# Patient Record
Sex: FEMALE | Race: BLACK OR AFRICAN AMERICAN | Employment: OTHER | URBAN - METROPOLITAN AREA
[De-identification: names, ages, dates, MRNs, and addresses within clinical notes are randomized per-mention and may not be internally consistent; named-entity substitution may affect disease eponyms.]

---

## 2017-01-27 ENCOUNTER — PATIENT MESSAGE (OUTPATIENT)
Dept: FAMILY MEDICINE CLINIC | Age: 62
End: 2017-01-27

## 2017-01-27 ENCOUNTER — OFFICE VISIT (OUTPATIENT)
Dept: FAMILY MEDICINE CLINIC | Age: 62
End: 2017-01-27

## 2017-01-27 VITALS
OXYGEN SATURATION: 96 % | HEART RATE: 86 BPM | HEIGHT: 67 IN | TEMPERATURE: 98 F | SYSTOLIC BLOOD PRESSURE: 110 MMHG | BODY MASS INDEX: 29.26 KG/M2 | DIASTOLIC BLOOD PRESSURE: 82 MMHG | WEIGHT: 186.4 LBS

## 2017-01-27 DIAGNOSIS — Z11.59 NEED FOR HEPATITIS C SCREENING TEST: ICD-10-CM

## 2017-01-27 DIAGNOSIS — I10 ESSENTIAL HYPERTENSION: ICD-10-CM

## 2017-01-27 DIAGNOSIS — J30.1 NON-SEASONAL ALLERGIC RHINITIS DUE TO POLLEN: ICD-10-CM

## 2017-01-27 DIAGNOSIS — E78.2 MIXED HYPERLIPIDEMIA: ICD-10-CM

## 2017-01-27 DIAGNOSIS — Z11.4 SCREENING FOR HIV (HUMAN IMMUNODEFICIENCY VIRUS): ICD-10-CM

## 2017-01-27 DIAGNOSIS — H91.93 HEARING LOSS, BILATERAL: Primary | ICD-10-CM

## 2017-01-27 DIAGNOSIS — Z12.11 SCREEN FOR COLON CANCER: ICD-10-CM

## 2017-01-27 DIAGNOSIS — H61.23 BILATERAL IMPACTED CERUMEN: ICD-10-CM

## 2017-01-27 LAB
A/G RATIO: 1.9 (ref 1.1–2.2)
ALBUMIN SERPL-MCNC: 4.6 G/DL (ref 3.4–5)
ALP BLD-CCNC: 139 U/L (ref 40–129)
ALT SERPL-CCNC: 16 U/L (ref 10–40)
ANION GAP SERPL CALCULATED.3IONS-SCNC: 13 MMOL/L (ref 3–16)
AST SERPL-CCNC: 18 U/L (ref 15–37)
BILIRUB SERPL-MCNC: 0.4 MG/DL (ref 0–1)
BUN BLDV-MCNC: 10 MG/DL (ref 7–20)
CALCIUM SERPL-MCNC: 9.9 MG/DL (ref 8.3–10.6)
CHLORIDE BLD-SCNC: 106 MMOL/L (ref 99–110)
CHOLESTEROL, TOTAL: 228 MG/DL (ref 0–199)
CO2: 25 MMOL/L (ref 21–32)
CREAT SERPL-MCNC: 0.7 MG/DL (ref 0.6–1.1)
GFR AFRICAN AMERICAN: >60
GFR NON-AFRICAN AMERICAN: >60
GLOBULIN: 2.4 G/DL
GLUCOSE BLD-MCNC: 90 MG/DL (ref 70–99)
HDLC SERPL-MCNC: 79 MG/DL (ref 40–60)
HEPATITIS C ANTIBODY INTERPRETATION: NORMAL
LDL CHOLESTEROL CALCULATED: 134 MG/DL
POTASSIUM SERPL-SCNC: 4.3 MMOL/L (ref 3.5–5.1)
SODIUM BLD-SCNC: 144 MMOL/L (ref 136–145)
TOTAL PROTEIN: 7 G/DL (ref 6.4–8.2)
TRIGL SERPL-MCNC: 75 MG/DL (ref 0–150)
VLDLC SERPL CALC-MCNC: 15 MG/DL

## 2017-01-27 PROCEDURE — 99214 OFFICE O/P EST MOD 30 MIN: CPT | Performed by: FAMILY MEDICINE

## 2017-01-27 RX ORDER — FLUTICASONE PROPIONATE 50 MCG
2 SPRAY, SUSPENSION (ML) NASAL DAILY
Qty: 1 BOTTLE | Refills: 3 | Status: SHIPPED | OUTPATIENT
Start: 2017-01-27 | End: 2020-03-05

## 2017-01-28 RX ORDER — TRIAMTERENE AND HYDROCHLOROTHIAZIDE 75; 50 MG/1; MG/1
1 TABLET ORAL DAILY
Qty: 90 TABLET | Refills: 1 | Status: SHIPPED | OUTPATIENT
Start: 2017-01-28 | End: 2017-09-19 | Stop reason: SDUPTHER

## 2017-01-30 LAB — HIV-1 AND HIV-2 ANTIBODIES: NORMAL

## 2017-06-29 ENCOUNTER — HOSPITAL ENCOUNTER (OUTPATIENT)
Dept: MRI IMAGING | Age: 62
Discharge: HOME | End: 2017-06-29
Attending: NEUROLOGICAL SURGERY | Admitting: NEUROLOGICAL SURGERY

## 2017-06-29 DIAGNOSIS — D35.2 BENIGN NEOPLASM OF PITUITARY GLAND AND CRANIOPHARYNGEAL DUCT (POUCH) (HCC): ICD-10-CM

## 2017-06-29 DIAGNOSIS — D35.3 BENIGN NEOPLASM OF PITUITARY GLAND AND CRANIOPHARYNGEAL DUCT (POUCH) (HCC): ICD-10-CM

## 2017-06-29 DIAGNOSIS — D35.2 BENIGN NEOPLASM OF PITUITARY GLAND (HCC): ICD-10-CM

## 2017-06-29 RX ORDER — SODIUM CHLORIDE 0.9 % (FLUSH) 0.9 %
10 SYRINGE (ML) INJECTION ONCE
Status: COMPLETED | OUTPATIENT
Start: 2017-06-29 | End: 2017-06-29

## 2017-06-29 RX ADMIN — Medication 10 ML: at 07:45

## 2017-08-24 ENCOUNTER — HOSPITAL ENCOUNTER (OUTPATIENT)
Dept: MAMMOGRAPHY | Age: 62
Discharge: HOME | End: 2017-08-24
Attending: FAMILY MEDICINE | Admitting: FAMILY MEDICINE

## 2017-08-24 DIAGNOSIS — Z12.31 VISIT FOR SCREENING MAMMOGRAM: ICD-10-CM

## 2017-09-19 ENCOUNTER — PATIENT MESSAGE (OUTPATIENT)
Dept: FAMILY MEDICINE CLINIC | Age: 62
End: 2017-09-19

## 2017-09-19 DIAGNOSIS — J01.90 ACUTE BACTERIAL SINUSITIS: Primary | ICD-10-CM

## 2017-09-19 DIAGNOSIS — I10 ESSENTIAL HYPERTENSION: ICD-10-CM

## 2017-09-19 DIAGNOSIS — B96.89 ACUTE BACTERIAL SINUSITIS: Primary | ICD-10-CM

## 2017-09-19 RX ORDER — AMOXICILLIN 500 MG/1
500 CAPSULE ORAL 3 TIMES DAILY
Qty: 30 CAPSULE | Refills: 0 | Status: SHIPPED | OUTPATIENT
Start: 2017-09-19 | End: 2017-09-29

## 2017-09-19 RX ORDER — TRIAMTERENE AND HYDROCHLOROTHIAZIDE 75; 50 MG/1; MG/1
1 TABLET ORAL DAILY
Qty: 30 TABLET | Refills: 0 | Status: SHIPPED | OUTPATIENT
Start: 2017-09-19 | End: 2017-10-17 | Stop reason: SDUPTHER

## 2017-10-17 DIAGNOSIS — I10 ESSENTIAL HYPERTENSION: ICD-10-CM

## 2017-10-17 RX ORDER — TRIAMTERENE AND HYDROCHLOROTHIAZIDE 75; 50 MG/1; MG/1
1 TABLET ORAL DAILY
Qty: 21 TABLET | Refills: 0 | Status: SHIPPED | OUTPATIENT
Start: 2017-10-17 | End: 2020-03-05

## 2018-06-07 ENCOUNTER — HOSPITAL ENCOUNTER (OUTPATIENT)
Dept: MRI IMAGING | Age: 63
Discharge: HOME | End: 2018-06-07
Attending: NEUROLOGICAL SURGERY | Admitting: NEUROLOGICAL SURGERY

## 2018-06-07 DIAGNOSIS — E23.7 DISORDER OF PITUITARY GLAND (HCC): ICD-10-CM

## 2018-06-07 DIAGNOSIS — E23.7: ICD-10-CM

## 2018-06-07 LAB
ALBUMIN SERPL-MCNC: 4.3 G/DL (ref 3.4–5)
ANION GAP SERPL CALCULATED.3IONS-SCNC: 12 MMOL/L (ref 3–16)
BUN BLDV-MCNC: 8 MG/DL (ref 7–20)
CALCIUM SERPL-MCNC: 9.6 MG/DL (ref 8.3–10.6)
CHLORIDE BLD-SCNC: 108 MMOL/L (ref 99–110)
CO2: 26 MMOL/L (ref 21–32)
CREAT SERPL-MCNC: 0.7 MG/DL (ref 0.6–1.2)
GFR AFRICAN AMERICAN: >60
GFR NON-AFRICAN AMERICAN: >60
GLUCOSE BLD-MCNC: 120 MG/DL (ref 70–99)
PHOSPHORUS: 3.2 MG/DL (ref 2.5–4.9)
POTASSIUM SERPL-SCNC: 4.2 MMOL/L (ref 3.5–5.1)
SODIUM BLD-SCNC: 146 MMOL/L (ref 136–145)

## 2018-06-07 RX ORDER — SODIUM CHLORIDE 0.9 % (FLUSH) 0.9 %
10 SYRINGE (ML) INJECTION ONCE
Status: COMPLETED | OUTPATIENT
Start: 2018-06-07 | End: 2018-06-07

## 2018-06-07 RX ADMIN — Medication 10 ML: at 08:07

## 2019-08-15 ENCOUNTER — HOSPITAL ENCOUNTER (OUTPATIENT)
Dept: MRI IMAGING | Age: 64
Discharge: HOME | End: 2019-08-15
Payer: COMMERCIAL

## 2019-08-15 DIAGNOSIS — D35.2 PITUITARY ADENOMA (HCC): ICD-10-CM

## 2019-08-15 LAB
BUN BLDV-MCNC: 9 MG/DL (ref 7–20)
CREAT SERPL-MCNC: 0.7 MG/DL (ref 0.6–1.2)
GFR AFRICAN AMERICAN: >60
GFR NON-AFRICAN AMERICAN: >60

## 2019-08-15 PROCEDURE — A9579 GAD-BASE MR CONTRAST NOS,1ML: HCPCS | Performed by: NEUROLOGICAL SURGERY

## 2019-08-15 PROCEDURE — 2580000003 HC RX 258: Performed by: NEUROLOGICAL SURGERY

## 2019-08-15 PROCEDURE — 82565 ASSAY OF CREATININE: CPT

## 2019-08-15 PROCEDURE — 84520 ASSAY OF UREA NITROGEN: CPT

## 2019-08-15 PROCEDURE — 36415 COLL VENOUS BLD VENIPUNCTURE: CPT

## 2019-08-15 PROCEDURE — 70553 MRI BRAIN STEM W/O & W/DYE: CPT

## 2019-08-15 PROCEDURE — 6360000004 HC RX CONTRAST MEDICATION: Performed by: NEUROLOGICAL SURGERY

## 2019-08-15 RX ORDER — 0.9 % SODIUM CHLORIDE 0.9 %
10 VIAL (ML) INJECTION
Status: COMPLETED | OUTPATIENT
Start: 2019-08-15 | End: 2019-08-15

## 2019-08-15 RX ADMIN — Medication 10 ML: at 11:07

## 2019-08-15 RX ADMIN — GADOTERIDOL 16 ML: 279.3 INJECTION, SOLUTION INTRAVENOUS at 11:07

## 2020-02-07 ENCOUNTER — HOSPITAL ENCOUNTER (OUTPATIENT)
Dept: MRI IMAGING | Age: 65
Discharge: HOME | End: 2020-02-07
Payer: COMMERCIAL

## 2020-02-07 LAB
BUN BLDV-MCNC: 8 MG/DL (ref 7–20)
CREAT SERPL-MCNC: 0.7 MG/DL (ref 0.6–1.2)
GFR AFRICAN AMERICAN: >60
GFR NON-AFRICAN AMERICAN: >60

## 2020-02-07 PROCEDURE — 84520 ASSAY OF UREA NITROGEN: CPT

## 2020-02-07 PROCEDURE — 6360000004 HC RX CONTRAST MEDICATION: Performed by: NEUROLOGICAL SURGERY

## 2020-02-07 PROCEDURE — 82565 ASSAY OF CREATININE: CPT

## 2020-02-07 PROCEDURE — 70553 MRI BRAIN STEM W/O & W/DYE: CPT

## 2020-02-07 PROCEDURE — 2580000003 HC RX 258: Performed by: NEUROLOGICAL SURGERY

## 2020-02-07 PROCEDURE — A9577 INJ MULTIHANCE: HCPCS | Performed by: NEUROLOGICAL SURGERY

## 2020-02-07 PROCEDURE — 36415 COLL VENOUS BLD VENIPUNCTURE: CPT

## 2020-02-07 RX ORDER — SODIUM CHLORIDE 0.9 % (FLUSH) 0.9 %
10 SYRINGE (ML) INJECTION PRN
Status: DISCONTINUED | OUTPATIENT
Start: 2020-02-07 | End: 2020-02-08 | Stop reason: HOSPADM

## 2020-02-07 RX ADMIN — Medication 10 ML: at 07:34

## 2020-02-07 RX ADMIN — GADOBENATE DIMEGLUMINE 15 ML: 529 INJECTION, SOLUTION INTRAVENOUS at 07:34

## 2020-03-05 ENCOUNTER — OFFICE VISIT (OUTPATIENT)
Dept: ENT CLINIC | Age: 65
End: 2020-03-05
Payer: COMMERCIAL

## 2020-03-05 VITALS
DIASTOLIC BLOOD PRESSURE: 87 MMHG | SYSTOLIC BLOOD PRESSURE: 141 MMHG | HEIGHT: 67 IN | TEMPERATURE: 98.9 F | BODY MASS INDEX: 30.42 KG/M2 | WEIGHT: 193.8 LBS | HEART RATE: 97 BPM

## 2020-03-05 PROCEDURE — 99244 OFF/OP CNSLTJ NEW/EST MOD 40: CPT | Performed by: OTOLARYNGOLOGY

## 2020-03-05 PROCEDURE — 31231 NASAL ENDOSCOPY DX: CPT | Performed by: OTOLARYNGOLOGY

## 2020-03-05 ASSESSMENT — ENCOUNTER SYMPTOMS
STRIDOR: 0
CHOKING: 0
NAUSEA: 0
EYE DISCHARGE: 0
FACIAL SWELLING: 0
SINUS PAIN: 0
BLOOD IN STOOL: 0
EYE PAIN: 0
DIARRHEA: 0
COLOR CHANGE: 0
SORE THROAT: 0
BACK PAIN: 0
VOICE CHANGE: 0
SHORTNESS OF BREATH: 0
CHEST TIGHTNESS: 0
CONSTIPATION: 0
VOMITING: 0
TROUBLE SWALLOWING: 0
SINUS PRESSURE: 0
COUGH: 0
WHEEZING: 0
APNEA: 0
RHINORRHEA: 0

## 2020-03-05 NOTE — PROGRESS NOTES
tobacco: Never Used   Substance and Sexual Activity    Alcohol use: Yes     Alcohol/week: 1.0 standard drinks     Types: 1 Glasses of wine per week     Comment: 2-3 glasses wine/week    Drug use: No    Sexual activity: Not on file   Lifestyle    Physical activity:     Days per week: Not on file     Minutes per session: Not on file    Stress: Not on file   Relationships    Social connections:     Talks on phone: Not on file     Gets together: Not on file     Attends Spiritism service: Not on file     Active member of club or organization: Not on file     Attends meetings of clubs or organizations: Not on file     Relationship status: Not on file    Intimate partner violence:     Fear of current or ex partner: Not on file     Emotionally abused: Not on file     Physically abused: Not on file     Forced sexual activity: Not on file   Other Topics Concern    Not on file   Social History Narrative    Not on file       DRUG/FOOD ALLERGIES: Metoprolol    CURRENT MEDICATIONS  Prior to Admission medications    Medication Sig Start Date End Date Taking? Authorizing Provider   triamterene-hydrochlorothiazide (MAXZIDE) 75-50 MG per tablet TAKE 1 TABLET BY MOUTH DAILY 10/17/17 10/17/18  Des Dowell MD   fluticasone [facility]) 50 MCG/ACT nasal spray 2 sprays by Nasal route daily 1/27/17 1/27/18  Des Dowell MD     Review of Systems   Constitutional: Negative for activity change, appetite change, chills, fatigue and fever. HENT: Negative for congestion, dental problem, drooling, ear discharge, ear pain, facial swelling, hearing loss, mouth sores, nosebleeds, postnasal drip, rhinorrhea, sinus pressure, sinus pain, sneezing, sore throat, tinnitus, trouble swallowing and voice change. Eyes: Negative for pain, discharge and visual disturbance. Respiratory: Negative for apnea, cough, choking, chest tightness, shortness of breath, wheezing and stridor. Cardiovascular: Negative for palpitations.    Gastrointestinal: Negative for blood in stool, constipation, diarrhea, nausea and vomiting. Endocrine: Negative for cold intolerance, heat intolerance, polydipsia, polyphagia and polyuria. Musculoskeletal: Negative for back pain, gait problem, neck pain and neck stiffness. Skin: Negative for color change, pallor, rash and wound. Allergic/Immunologic: Negative for environmental allergies, food allergies and immunocompromised state. Neurological: Negative for dizziness, facial asymmetry, speech difficulty, light-headedness, numbness and headaches. Hematological: Negative for adenopathy. Does not bruise/bleed easily. Psychiatric/Behavioral: Negative for agitation, confusion, self-injury and sleep disturbance. The patient is not nervous/anxious. Objective:   Physical Exam  Constitutional:       Appearance: She is well-developed. She is not ill-appearing. HENT:      Head: Normocephalic and atraumatic. Not macrocephalic and not microcephalic. No raccoon eyes, Devries's sign, abrasion, contusion, right periorbital erythema, left periorbital erythema or laceration. Hair is normal.      Jaw: No trismus. Salivary Glands: Right salivary gland is not diffusely enlarged. Left salivary gland is not diffusely enlarged. Right Ear: Hearing, tympanic membrane and external ear normal. No decreased hearing noted. No drainage, swelling or tenderness. No middle ear effusion. No mastoid tenderness. Tympanic membrane is not perforated, retracted or bulging. Tympanic membrane has normal mobility. Left Ear: Hearing, tympanic membrane and external ear normal. No decreased hearing noted. No drainage, swelling or tenderness. No middle ear effusion. No mastoid tenderness. Tympanic membrane is not perforated, retracted or bulging. Tympanic membrane has normal mobility. Ears:      Hill exam findings: does not lateralize. Right Rinne: AC > BC. Left Rinne: AC > BC.      Nose: No nasal deformity, septal deviation, laceration, mucosal edema or rhinorrhea. Right Nostril: No epistaxis. Left Nostril: No epistaxis. Right Turbinates: Not enlarged. Left Turbinates: Not enlarged. Right Sinus: No maxillary sinus tenderness or frontal sinus tenderness. Left Sinus: No maxillary sinus tenderness or frontal sinus tenderness. Mouth/Throat:      Lips: No lesions. Mouth: Mucous membranes are not pale, not dry and not cyanotic. No lacerations or oral lesions. Dentition: Normal dentition. No dental caries or dental abscesses. Tongue: No lesions. Palate: No mass. Pharynx: Uvula midline. No oropharyngeal exudate, posterior oropharyngeal erythema or uvula swelling. Tonsils: No tonsillar abscesses. Eyes:      General: Lids are normal. Lids are everted, no foreign bodies appreciated. Right eye: No discharge. Left eye: No discharge. Extraocular Movements:      Right eye: Normal extraocular motion and no nystagmus. Left eye: Normal extraocular motion and no nystagmus. Conjunctiva/sclera:      Right eye: No chemosis or exudate. Left eye: No chemosis or exudate. Neck:      Musculoskeletal: Neck supple. Thyroid: No thyroid mass or thyromegaly. Vascular: Normal carotid pulses. Trachea: Trachea normal. No tracheal deviation. Cardiovascular:      Rate and Rhythm: Normal rate and regular rhythm. Pulmonary:      Effort: No tachypnea, bradypnea or respiratory distress. Breath sounds: No stridor. Musculoskeletal:      Right shoulder: She exhibits normal range of motion. Left shoulder: She exhibits normal range of motion. Lymphadenopathy:      Head:      Right side of head: No submental, submandibular, tonsillar, preauricular, posterior auricular or occipital adenopathy. Left side of head: No submental, submandibular, tonsillar, preauricular, posterior auricular or occipital adenopathy.       Cervical:      Right cervical: No superficial, deep or posterior cervical adenopathy. Left cervical: No superficial, deep or posterior cervical adenopathy. Skin:     Findings: No bruising, erythema, laceration or lesion. Neurological:      Mental Status: She is alert and oriented to person, place, and time. Psychiatric:         Speech: Speech normal.         Behavior: Behavior normal.       Nasal Endoscopy    Pre OP: recurrent pituitary tumor  Post OP: same  Procedure: Nasal endoscopy    After obtaining verbal consent from the patient 1% lidocaine with afrin was sprayed into the nasal cavities. After allowing a time for anesthesia, a nasal endoscope was placed into the nostril. The septum, inferior, and middle turbinates were examined. The middle meatus, and sphenoethmoid recess was examined bilaterally. Cultures were not obtained from the sinuses. There were no complications. Pertinent positives included: There was not edema and purulence in the left middle meatus. There was not edema and purulence at the right middle meatus. Polyps were not identified in the sinuses. Masses were not identified. Sphenoid patent    Tolerated well without complication. I attest that I was present for and did the entire procedure myself. Assessment:       Diagnosis Orders   1. Pituitary macroadenoma ([facility] [address]             Plan:      OR for endo pit    The patient has a diagnosis of pituitary tumor which has not been responsive or cannot be treated with maximal medical therapy. The patient has been advised of options including further medical therapy, surgery, or nothing. The risks and benefits of surgery were described not limited to injury to the skull base, brain, stroke, hemorrhage, brain fluid leak, double vision, visual loss, epistaxis, and need for further surgical management of disease. Patient expectations during and after surgery including post-operative sinonasal care and pain control were described.  Questions were answered and the

## 2020-03-06 ENCOUNTER — TELEPHONE (OUTPATIENT)
Dept: ENT CLINIC | Age: 65
End: 2020-03-06

## 2020-03-06 NOTE — TELEPHONE ENCOUNTER
Received surgery order from Dr. Denton Dee, patient is to be scheduled for a neuroendoscopy w/ removal of pituitary tumor, transphenoidal and stereotactic computer assisted surgery. This is a co-case with Dr. Abby Pruett from [address] [place] [address]. [facility]  Procedure length and patient status to be determined by Dr. Rosa Mansfield.   [facility].

## 2020-03-26 NOTE — PROGRESS NOTES
[facility] / [facility] ([facility]) [address], [address]    Acknowledgment of Informed Consent for Surgical or Medical Procedure and Sedation  I agree to allow doctor(s) [address] and his/her associates or assistants, including residents and/or other qualified medical practitioner to perform the following medical treatment or procedure and to administer or direct the administration of sedation as necessary:  Procedure(s): NEUROENDOSCOPY WITH REMOVAL OF PITUITARY TUMOR TRANSPHENOIDAL, STEREOTACTIC COMPUTER ASSISTED SURGERY, REDO ENDOSCOPIC ENDONASAL TRANSPHENOIDAL RESECTION OF PITUITARY TUMOR, POSSIBLE LUMBAR DRAIN, POSSIBLE ABDOMINAL FAT GRAFT  My doctor has explained the following regarding the proposed procedure:   the explanation of the procedure   the benefits of the procedure   the potential problems that might occur during recuperation   the risks and side effects of the procedure which could include but are not limited to severe blood loss, infection, stroke or death   the benefits, risks and side effect of alternative procedures including the consequences of declining this procedure or any alternative procedures   the likelihood of achieving satisfactory results. I acknowledge no guarantee or assurance has been made to me regarding the results. I understand that during the course of this treatment/procedure, unforeseen conditions can occur which require an additional or different procedure. I agree to allow my physician or assistants to perform such extension of the original procedure as they may find necessary. I understand that sedation will often result in temporary impairment of memory and fine motor skills and that sedation can occasionally progress to a state of deep sedation or general anesthesia.     I understand the risks of anesthesia for surgery include, but are not limited to, sore throat, hoarseness, injury to face, mouth, or teeth; nausea; headache; injury to blood vessels or nerves; death, brain damage, or paralysis. I understand that if I have a Limitation of Treatment order in effect during my hospitalization, the order may or may not be in effect during this procedure. I give my doctor permission to give me blood or blood products. I understand that there are risks with receiving blood such as hepatitis, AIDS, fever, or allergic reaction. I acknowledge that the risks, benefits, and alternatives of this treatment have been explained to me and that no express or implied warranty has been given by the hospital, any blood bank, or any person or entity as to the blood or blood components transfused. At the discretion of my doctor, I agree to allow observers, equipment/product representatives and allow photographing, and/or televising of the procedure, provided my name or identity is maintained confidentially. I agree the hospital may dispose of or use for scientific or educational purposes any tissue, fluid, or body parts which may be removed.     ________________________________Date________Time______ am/pm  ([facility])  Patient or Signature of Closest Relative or Legal Guardian    ________________________________Date________Time______am/pm      Page 1 of  1  Witness

## 2020-06-10 ENCOUNTER — OFFICE VISIT (OUTPATIENT)
Dept: INTERNAL MEDICINE CLINIC | Age: 65
End: 2020-06-10
Payer: COMMERCIAL

## 2020-06-10 VITALS
WEIGHT: 195 LBS | HEIGHT: 67 IN | BODY MASS INDEX: 30.61 KG/M2 | HEART RATE: 76 BPM | DIASTOLIC BLOOD PRESSURE: 94 MMHG | SYSTOLIC BLOOD PRESSURE: 138 MMHG

## 2020-06-10 DIAGNOSIS — Z76.89 ENCOUNTER TO ESTABLISH CARE: ICD-10-CM

## 2020-06-10 PROBLEM — D35.2 BENIGN NEOPLASM OF PITUITARY GLAND AND CRANIOPHARYNGEAL DUCT (HCC): Status: ACTIVE | Noted: 2020-06-10

## 2020-06-10 PROBLEM — D35.3 BENIGN NEOPLASM OF PITUITARY GLAND AND CRANIOPHARYNGEAL DUCT (HCC): Status: ACTIVE | Noted: 2020-06-10

## 2020-06-10 PROBLEM — E66.3 OVERWEIGHT: Status: ACTIVE | Noted: 2017-07-03

## 2020-06-10 LAB
CHOLESTEROL, TOTAL: 218 MG/DL (ref 0–199)
HDLC SERPL-MCNC: 61 MG/DL (ref 40–60)
LDL CHOLESTEROL CALCULATED: 137 MG/DL
TRIGL SERPL-MCNC: 99 MG/DL (ref 0–150)
VLDLC SERPL CALC-MCNC: 20 MG/DL

## 2020-06-10 PROCEDURE — 81002 URINALYSIS NONAUTO W/O SCOPE: CPT | Performed by: NURSE PRACTITIONER

## 2020-06-10 PROCEDURE — 93000 ELECTROCARDIOGRAM COMPLETE: CPT | Performed by: NURSE PRACTITIONER

## 2020-06-10 PROCEDURE — 99244 OFF/OP CNSLTJ NEW/EST MOD 40: CPT | Performed by: NURSE PRACTITIONER

## 2020-06-10 RX ORDER — TRIAMTERENE AND HYDROCHLOROTHIAZIDE 75; 50 MG/1; MG/1
1 TABLET ORAL DAILY
Qty: 90 TABLET | Refills: 3 | Status: SHIPPED | OUTPATIENT
Start: 2020-06-10 | End: 2020-09-23

## 2020-06-10 RX ORDER — NITROFURANTOIN 25; 75 MG/1; MG/1
100 CAPSULE ORAL 2 TIMES DAILY
Qty: 10 CAPSULE | Refills: 0 | Status: SHIPPED | OUTPATIENT
Start: 2020-06-10 | End: 2020-06-15

## 2020-06-10 ASSESSMENT — ENCOUNTER SYMPTOMS
SINUS PRESSURE: 0
SHORTNESS OF BREATH: 0
WHEEZING: 0
CHEST TIGHTNESS: 0
COUGH: 0
EYE PAIN: 0
ABDOMINAL PAIN: 0
BLOOD IN STOOL: 0
APNEA: 0
RHINORRHEA: 0
NAUSEA: 0
EYE REDNESS: 0
ABDOMINAL DISTENTION: 0
DIARRHEA: 0
VOMITING: 0
BACK PAIN: 0
CONSTIPATION: 0

## 2020-06-10 NOTE — PROGRESS NOTES
6/10/2020    This is a 60 y.o. female   Chief Complaint   Patient presents with    Pre-op Exam     Pitutary tumor removal 7/1 Dr Olivia Torres     HPI     New patient appointment and preop. Carlos Alberto Farley is a 60 y.o.  female who comes for a preoperative exam.  She  is referred by Dr. Sree Cramer for perioperative risk determination for upcoming surgery for NEURO ENDOSCOPY WITH REMOVAL OF PITUITARY TUMOR TRANSPHENOIDAL, STEREOTACTIC COMPUTER ASSISTED SURGERY, REDO ENDOSCOPIC ENDONASAL TRANSPHENOIDAL RESECTION OF PITUITARY TUMOR, POSSIBLE LUMBAR DRAIN, POSSIBLE ABDOMINAL FAT GRAFT on July 1, 2020. Denies taking any asa, blood thinners, fish oil, NSAIDs or herbals. Denies any previous complications with anesthesia. Carlos Alberto Farley returns for follow up of hypertension. Patient is not currently on any medication for her blood pressure. Her blood pressure is not well controlled today. Has not been on her BP medication for about a year. Her BP was previously controlled on this medication. Started last week with dysuria, frequency and urgency - waxing and waning. Denies fever or chills.        Past Medical History:   Diagnosis Date    Allergic rhinitis     Chicken pox     GERD (gastroesophageal reflux disease)     Hypertension     Kidney stone     Pituitary adenoma [facility])      Past Surgical History:   Procedure Laterality Date    CYST REMOVAL Left 9/24/13    EXCSION OF LEFT HAND DORSAL GANGLION CYST    HYSTERECTOMY      PITUITARY SURGERY  2013    CARRILLO AND BSO       Social History     Socioeconomic History    Marital status:      Spouse name: Not on file    Number of children: Not on file    Years of education: Not on file    Highest education level: Not on file   Occupational History    Not on file   Social Needs    Financial resource strain: Not hard at all   [place] insecurity     Worry: Never true     Inability: Never true   [employer] needs     Medical: No     Non-medical: No

## 2020-06-11 ENCOUNTER — TELEPHONE (OUTPATIENT)
Dept: INTERNAL MEDICINE CLINIC | Age: 65
End: 2020-06-11

## 2020-06-12 LAB
ORGANISM: ABNORMAL
URINE CULTURE, ROUTINE: ABNORMAL

## 2020-06-17 ENCOUNTER — OFFICE VISIT (OUTPATIENT)
Dept: INTERNAL MEDICINE CLINIC | Age: 65
End: 2020-06-17
Payer: COMMERCIAL

## 2020-06-17 VITALS
BODY MASS INDEX: 29.29 KG/M2 | TEMPERATURE: 98.6 F | WEIGHT: 187 LBS | HEART RATE: 96 BPM | DIASTOLIC BLOOD PRESSURE: 86 MMHG | SYSTOLIC BLOOD PRESSURE: 136 MMHG

## 2020-06-17 PROCEDURE — 99213 OFFICE O/P EST LOW 20 MIN: CPT | Performed by: NURSE PRACTITIONER

## 2020-06-17 RX ORDER — CIPROFLOXACIN 250 MG/1
250 TABLET, FILM COATED ORAL 2 TIMES DAILY
Qty: 6 TABLET | Refills: 0 | Status: SHIPPED | OUTPATIENT
Start: 2020-06-17 | End: 2021-03-12 | Stop reason: SDUPTHER

## 2020-06-19 NOTE — PROGRESS NOTES
teeth; nausea; headache; injury to blood vessels or nerves; death, brain damage, or paralysis. I understand that if I have a Limitation of Treatment order in effect during my hospitalization, the order may or may not be in effect during this procedure. I give my doctor permission to give me blood or blood products. I understand that there are risks with receiving blood such as hepatitis, AIDS, fever, or allergic reaction. I acknowledge that the risks, benefits, and alternatives of this treatment have been explained to me and that no express or implied warranty has been given by the hospital, any blood bank, or any person or entity as to the blood or blood components transfused. At the discretion of my doctor, I agree to allow observers, equipment/product representatives and allow photographing, and/or televising of the procedure, provided my name or identity is maintained confidentially. I agree the hospital may dispose of or use for scientific or educational purposes any tissue, fluid, or body parts which may be removed.     ________________________________Date________Time______ am/pm  ([facility])  Patient or Signature of Closest Relative or Legal Guardian    ________________________________Date________Time______am/pm      Page 1 of  1  Witness

## 2020-06-24 ENCOUNTER — HOSPITAL ENCOUNTER (OUTPATIENT)
Dept: CT IMAGING | Age: 65
Discharge: HOME | End: 2020-06-24
Payer: COMMERCIAL

## 2020-06-24 LAB
GFR AFRICAN AMERICAN: >60
GFR NON-AFRICAN AMERICAN: >60
PERFORMED ON: NORMAL
POC CREATININE: 0.8 MG/DL (ref 0.6–1.2)
POC SAMPLE TYPE: NORMAL

## 2020-06-24 PROCEDURE — 70486 CT MAXILLOFACIAL W/O DYE: CPT

## 2020-06-24 PROCEDURE — 70496 CT ANGIOGRAPHY HEAD: CPT

## 2020-06-24 PROCEDURE — 6360000004 HC RX CONTRAST MEDICATION: Performed by: NEUROLOGICAL SURGERY

## 2020-06-24 PROCEDURE — 82565 ASSAY OF CREATININE: CPT

## 2020-06-24 RX ADMIN — IOPAMIDOL 80 ML: 755 INJECTION, SOLUTION INTRAVENOUS at 09:33

## 2020-06-25 ENCOUNTER — OFFICE VISIT (OUTPATIENT)
Dept: PRIMARY CARE CLINIC | Age: 65
End: 2020-06-25
Payer: COMMERCIAL

## 2020-06-25 PROCEDURE — 99211 OFF/OP EST MAY X REQ PHY/QHP: CPT | Performed by: NURSE PRACTITIONER

## 2020-06-25 RX ORDER — IBUPROFEN 600 MG/1
600 TABLET ORAL PRN
Status: ON HOLD | COMMUNITY
End: 2020-07-04 | Stop reason: HOSPADM

## 2020-06-25 NOTE — PROGRESS NOTES
[facility] PRE-SURGICAL TESTING INSTRUCTIONS                              PRIOR TO PROCEDURE DATE:  1. Please follow any guidelines/instructions prior to your procedure as advised by your surgeon. 2. Arrange for someone to drive you home and be with you for the first 24 hours after discharge for your safety after your procedure for which you received sedation. Ensure it is someone we can share information with regarding your discharge. 3. You must contact your surgeon for instructions IF:   You are taking any blood thinners, aspirin, anti-inflammatory or vitamin E.   There is a change in your physical condition such as a cold, fever, rash, cuts, sores or any other infection, especially near your surgical site. 4. Do not drink alcohol the day before or day of your procedure. 5. A Pre-op History and Physical for surgery MUST be completed by your Physician or Urgent Care within 30 days of your procedure date. Please bring a copy with you on the day of your procedure and along with any other testing performed. THE DAY OF YOUR PROCEDURE:  1. Follow instructions for ARRIVAL TIME as DIRECTED BY YOUR SURGEON. I    2. Enter the MAIN entrance from [employer] and follow the signs to the free [employer] or [employer] parking (offered free of charge 6am-5pm). 3. Enter the Main Entrance of the hospital (do not enter from the lower level of the parking garage). Upon entrance, check in with the  at the main desk on your left. If no one is available at the desk, proceed into the [facility] Waiting Room and go through the door directly into the [facility]. There is a Check-in desk ACROSS from Room 5 (marked with a sign hanging from the ceiling). The phone number for the surgery center is [phone]. 4. Please call [phone] option #2 option #2 if you have not been preregistered yet. On the day of your procedure bring your insurance card and photo ID.  You will be room.    13. If you have a Living Will or Durable Power of , please bring a copy on the day of your procedure. 15. With your permission, one family member may accompany you while you are being prepared for surgery. Once you are ready, additional family members may join you. HOW WE KEEP YOU SAFE and WORK TO PREVENT SURGICAL SITE INFECTIONS:  1. Health care workers should always check your ID bracelet to verify your name and birth date. You will be asked many times to state your name, date of birth, and allergies. 2. Health care workers should always clean their hands with soap or alcohol gel before providing care to you. It is okay to ask anyone if they cleaned their hands before they touch you. 3. You will be actively involved in verifying the type of procedure you are having and ensuring the correct surgical site. This will be confirmed multiple times prior to your procedure. Do NOT vy your surgery site UNLESS instructed to by your surgeon. 4. Do not shave or wax for 72 hours prior to procedure near your operative site. Shaving with a razor can irritate your skin and make it easier to develop an infection. On the day of your procedure, any hair that needs to be removed near the surgical site will be clipped by a healthcare worker using a special clippers designed to avoid skin irritation. 5. When you are in the operating room, your surgical site will be cleansed with a special soap, and in most cases, you will be given an antibiotic before the surgery begins. What to expect AFTER YOUR PROCEDURE:  1. Immediately following your procedure, your will be taken to the PACU for the first phase of your recovery. Your nurse will help you recover from any potential side effects of anesthesia, such as extreme drowsiness, changes in your vital signs or breathing patterns. Nausea, headache, muscle aches, or sore throat may also occur after anesthesia.   Your nurse will help you manage these potential side effects. 2. For comfort and safety, arrange to have someone at home with you for the first 24 hours after discharge. 3. You and your family will be given written instructions about your diet, activity, dressing care, medications, and return visits. 4. Once at home, should issues with nausea, pain, or bleeding occur, or should you notice any signs of infection, you should call your surgeon. 5. Always clean your hands before and after caring for your wound. Do not let your family touch your surgery site without cleaning their hands. 6. Narcotic pain medications can cause significant constipation. You may want to add a stool softener to your postoperative medication schedule or speak to your surgeon on how best to manage this SIDE EFFECT. SPECIAL INSTRUCTIONS     Thank you for allowing us to care for you. We strive to exceed your expectations in the delivery of care and service provided to you and your family. If you need to contact us for any reason, please call us at [phone]    Instructions reviewed with patient during preadmission testing phone interview. Josefa Braxton. 6/25/2020 .2:10 PM      ADDITIONAL EDUCATIONAL INFORMATION REVIEWED PER PHONE WITH YOU AND/OR YOUR FAMILY:  ANTIBACTERIAL SOAP

## 2020-06-26 ENCOUNTER — HOSPITAL ENCOUNTER (OUTPATIENT)
Age: 65
Discharge: HOME | End: 2020-06-26
Payer: COMMERCIAL

## 2020-06-26 LAB
ANION GAP SERPL CALCULATED.3IONS-SCNC: 12 MMOL/L (ref 3–16)
APTT: 31.9 SEC (ref 24.2–36.2)
BASOPHILS ABSOLUTE: 0.1 K/UL (ref 0–0.2)
BASOPHILS RELATIVE PERCENT: 1.1 %
BUN BLDV-MCNC: 7 MG/DL (ref 7–20)
CALCIUM SERPL-MCNC: 9.2 MG/DL (ref 8.3–10.6)
CHLORIDE BLD-SCNC: 108 MMOL/L (ref 99–110)
CO2: 24 MMOL/L (ref 21–32)
CREAT SERPL-MCNC: 0.7 MG/DL (ref 0.6–1.2)
EOSINOPHILS ABSOLUTE: 0.3 K/UL (ref 0–0.6)
EOSINOPHILS RELATIVE PERCENT: 3.2 %
GFR AFRICAN AMERICAN: >60
GFR NON-AFRICAN AMERICAN: >60
GLUCOSE BLD-MCNC: 104 MG/DL (ref 70–99)
HCT VFR BLD CALC: 36.9 % (ref 36–48)
HEMOGLOBIN: 12 G/DL (ref 12–16)
INR BLD: 1.03 (ref 0.86–1.14)
LYMPHOCYTES ABSOLUTE: 2.6 K/UL (ref 1–5.1)
LYMPHOCYTES RELATIVE PERCENT: 30.8 %
MCH RBC QN AUTO: 30.2 PG (ref 26–34)
MCHC RBC AUTO-ENTMCNC: 32.4 G/DL (ref 31–36)
MCV RBC AUTO: 93.2 FL (ref 80–100)
MONOCYTES ABSOLUTE: 0.8 K/UL (ref 0–1.3)
MONOCYTES RELATIVE PERCENT: 8.9 %
NEUTROPHILS ABSOLUTE: 4.7 K/UL (ref 1.7–7.7)
NEUTROPHILS RELATIVE PERCENT: 56 %
PDW BLD-RTO: 14.6 % (ref 12.4–15.4)
PLATELET # BLD: 328 K/UL (ref 135–450)
PMV BLD AUTO: 8.5 FL (ref 5–10.5)
POTASSIUM SERPL-SCNC: 4.4 MMOL/L (ref 3.5–5.1)
PROTHROMBIN TIME: 11.9 SEC (ref 10–13.2)
RBC # BLD: 3.96 M/UL (ref 4–5.2)
SODIUM BLD-SCNC: 144 MMOL/L (ref 136–145)
WBC # BLD: 8.5 K/UL (ref 4–11)

## 2020-06-26 PROCEDURE — 80048 BASIC METABOLIC PNL TOTAL CA: CPT

## 2020-06-26 PROCEDURE — 85730 THROMBOPLASTIN TIME PARTIAL: CPT

## 2020-06-26 PROCEDURE — 85025 COMPLETE CBC W/AUTO DIFF WBC: CPT

## 2020-06-26 PROCEDURE — 36415 COLL VENOUS BLD VENIPUNCTURE: CPT

## 2020-06-26 PROCEDURE — 85610 PROTHROMBIN TIME: CPT

## 2020-06-27 LAB
SARS-COV-2: NOT DETECTED
SOURCE: NORMAL

## 2020-06-30 ENCOUNTER — TELEPHONE (OUTPATIENT)
Dept: ENT CLINIC | Age: 65
End: 2020-06-30

## 2020-06-30 ENCOUNTER — ANESTHESIA EVENT (OUTPATIENT)
Dept: OPERATING ROOM | Age: 65
DRG: 614 | End: 2020-06-30
Payer: COMMERCIAL

## 2020-07-01 ENCOUNTER — HOSPITAL ENCOUNTER (INPATIENT)
Age: 65
LOS: 3 days | Discharge: HOME | DRG: 614 | End: 2020-07-04
Attending: OTOLARYNGOLOGY | Admitting: NEUROLOGICAL SURGERY
Payer: COMMERCIAL

## 2020-07-01 ENCOUNTER — ANESTHESIA (OUTPATIENT)
Dept: OPERATING ROOM | Age: 65
DRG: 614 | End: 2020-07-01
Payer: COMMERCIAL

## 2020-07-01 VITALS — SYSTOLIC BLOOD PRESSURE: 159 MMHG | OXYGEN SATURATION: 98 % | DIASTOLIC BLOOD PRESSURE: 89 MMHG | TEMPERATURE: 96.4 F

## 2020-07-01 LAB
ABO/RH: NORMAL
ANTIBODY SCREEN: NORMAL
GLUCOSE BLD-MCNC: 120 MG/DL (ref 70–99)
OSMOLALITY URINE: 610 MOSM/KG (ref 390–1070)
OSMOLALITY: 300 MOSM/KG (ref 278–305)
PERFORMED ON: ABNORMAL
SODIUM BLD-SCNC: 136 MMOL/L (ref 136–145)

## 2020-07-01 PROCEDURE — 3700000000 HC ANESTHESIA ATTENDED CARE: Performed by: OTOLARYNGOLOGY

## 2020-07-01 PROCEDURE — 8E09XBF COMPUTER ASSISTED PROCEDURE OF HEAD AND NECK REGION, WITH FLUOROSCOPY: ICD-10-PCS | Performed by: OTOLARYNGOLOGY

## 2020-07-01 PROCEDURE — 6360000002 HC RX W HCPCS: Performed by: NEUROLOGICAL SURGERY

## 2020-07-01 PROCEDURE — 2580000003 HC RX 258: Performed by: NEUROLOGICAL SURGERY

## 2020-07-01 PROCEDURE — 0GB04ZZ EXCISION OF PITUITARY GLAND, PERCUTANEOUS ENDOSCOPIC APPROACH: ICD-10-PCS | Performed by: OTOLARYNGOLOGY

## 2020-07-01 PROCEDURE — 2720000010 HC SURG SUPPLY STERILE: Performed by: OTOLARYNGOLOGY

## 2020-07-01 PROCEDURE — 2780000010 HC IMPLANT OTHER: Performed by: OTOLARYNGOLOGY

## 2020-07-01 PROCEDURE — 6370000000 HC RX 637 (ALT 250 FOR IP): Performed by: OTOLARYNGOLOGY

## 2020-07-01 PROCEDURE — 2500000003 HC RX 250 WO HCPCS: Performed by: ANESTHESIOLOGY

## 2020-07-01 PROCEDURE — 86901 BLOOD TYPING SEROLOGIC RH(D): CPT

## 2020-07-01 PROCEDURE — 6360000002 HC RX W HCPCS: Performed by: INTERNAL MEDICINE

## 2020-07-01 PROCEDURE — 2580000003 HC RX 258: Performed by: ANESTHESIOLOGY

## 2020-07-01 PROCEDURE — 3600000004 HC SURGERY LEVEL 4 BASE: Performed by: OTOLARYNGOLOGY

## 2020-07-01 PROCEDURE — 81003 URINALYSIS AUTO W/O SCOPE: CPT

## 2020-07-01 PROCEDURE — 2500000003 HC RX 250 WO HCPCS: Performed by: OTOLARYNGOLOGY

## 2020-07-01 PROCEDURE — 6370000000 HC RX 637 (ALT 250 FOR IP): Performed by: NEUROLOGICAL SURGERY

## 2020-07-01 PROCEDURE — 84295 ASSAY OF SERUM SODIUM: CPT

## 2020-07-01 PROCEDURE — 61782 SCAN PROC CRANIAL EXTRA: CPT | Performed by: OTOLARYNGOLOGY

## 2020-07-01 PROCEDURE — 6360000002 HC RX W HCPCS: Performed by: ANESTHESIOLOGY

## 2020-07-01 PROCEDURE — 3700000001 HC ADD 15 MINUTES (ANESTHESIA): Performed by: OTOLARYNGOLOGY

## 2020-07-01 PROCEDURE — 6360000002 HC RX W HCPCS

## 2020-07-01 PROCEDURE — 2000000000 HC ICU R&B

## 2020-07-01 PROCEDURE — 2500000003 HC RX 250 WO HCPCS: Performed by: NURSE ANESTHETIST, CERTIFIED REGISTERED

## 2020-07-01 PROCEDURE — 3600000014 HC SURGERY LEVEL 4 ADDTL 15MIN: Performed by: OTOLARYNGOLOGY

## 2020-07-01 PROCEDURE — 86900 BLOOD TYPING SEROLOGIC ABO: CPT

## 2020-07-01 PROCEDURE — 83930 ASSAY OF BLOOD OSMOLALITY: CPT

## 2020-07-01 PROCEDURE — 62165 REMOVE PITUIT TUMOR W/SCOPE: CPT | Performed by: OTOLARYNGOLOGY

## 2020-07-01 PROCEDURE — 83935 ASSAY OF URINE OSMOLALITY: CPT

## 2020-07-01 PROCEDURE — 86850 RBC ANTIBODY SCREEN: CPT

## 2020-07-01 PROCEDURE — 7100000000 HC PACU RECOVERY - FIRST 15 MIN: Performed by: OTOLARYNGOLOGY

## 2020-07-01 PROCEDURE — 7100000001 HC PACU RECOVERY - ADDTL 15 MIN: Performed by: OTOLARYNGOLOGY

## 2020-07-01 PROCEDURE — 2709999900 HC NON-CHARGEABLE SUPPLY: Performed by: OTOLARYNGOLOGY

## 2020-07-01 PROCEDURE — 6360000002 HC RX W HCPCS: Performed by: NURSE ANESTHETIST, CERTIFIED REGISTERED

## 2020-07-01 DEVICE — DURAGEN® PLUS DURAL REGENERATION MATRIX, 3 IN X 3 IN (7.5 CM X 7.5 CM)
Type: IMPLANTABLE DEVICE | Status: FUNCTIONAL
Brand: DURAGEN® PLUS

## 2020-07-01 RX ORDER — METOCLOPRAMIDE HYDROCHLORIDE 5 MG/ML
10 INJECTION INTRAMUSCULAR; INTRAVENOUS ONCE
Status: COMPLETED | OUTPATIENT
Start: 2020-07-01 | End: 2020-07-01

## 2020-07-01 RX ORDER — LABETALOL 20 MG/4 ML (5 MG/ML) INTRAVENOUS SYRINGE
5 EVERY 10 MIN PRN
Status: DISCONTINUED | OUTPATIENT
Start: 2020-07-01 | End: 2020-07-01 | Stop reason: HOSPADM

## 2020-07-01 RX ORDER — PROPOFOL 10 MG/ML
INJECTION, EMULSION INTRAVENOUS PRN
Status: DISCONTINUED | OUTPATIENT
Start: 2020-07-01 | End: 2020-07-01 | Stop reason: SDUPTHER

## 2020-07-01 RX ORDER — HEPARIN SODIUM 5000 [USP'U]/ML
5000 INJECTION, SOLUTION INTRAVENOUS; SUBCUTANEOUS EVERY 8 HOURS
Status: DISCONTINUED | OUTPATIENT
Start: 2020-07-02 | End: 2020-07-04 | Stop reason: HOSPADM

## 2020-07-01 RX ORDER — LIDOCAINE HYDROCHLORIDE 10 MG/ML
1 INJECTION, SOLUTION EPIDURAL; INFILTRATION; INTRACAUDAL; PERINEURAL
Status: DISCONTINUED | OUTPATIENT
Start: 2020-07-01 | End: 2020-07-01

## 2020-07-01 RX ORDER — SUCCINYLCHOLINE/SOD CL,ISO/PF 200MG/10ML
SYRINGE (ML) INTRAVENOUS PRN
Status: DISCONTINUED | OUTPATIENT
Start: 2020-07-01 | End: 2020-07-01 | Stop reason: SDUPTHER

## 2020-07-01 RX ORDER — PROMETHAZINE HYDROCHLORIDE 25 MG/1
12.5 TABLET ORAL EVERY 6 HOURS PRN
Status: DISCONTINUED | OUTPATIENT
Start: 2020-07-01 | End: 2020-07-01

## 2020-07-01 RX ORDER — ONDANSETRON 2 MG/ML
4 INJECTION INTRAMUSCULAR; INTRAVENOUS
Status: COMPLETED | OUTPATIENT
Start: 2020-07-01 | End: 2020-07-01

## 2020-07-01 RX ORDER — OXYCODONE HYDROCHLORIDE 5 MG/1
10 TABLET ORAL EVERY 4 HOURS PRN
Status: DISCONTINUED | OUTPATIENT
Start: 2020-07-01 | End: 2020-07-04 | Stop reason: HOSPADM

## 2020-07-01 RX ORDER — SODIUM CHLORIDE 0.9 % (FLUSH) 0.9 %
10 SYRINGE (ML) INJECTION EVERY 12 HOURS SCHEDULED
Status: DISCONTINUED | OUTPATIENT
Start: 2020-07-01 | End: 2020-07-01

## 2020-07-01 RX ORDER — FENTANYL CITRATE 50 UG/ML
25 INJECTION, SOLUTION INTRAMUSCULAR; INTRAVENOUS EVERY 5 MIN PRN
Status: DISCONTINUED | OUTPATIENT
Start: 2020-07-01 | End: 2020-07-01 | Stop reason: HOSPADM

## 2020-07-01 RX ORDER — OXYMETAZOLINE HYDROCHLORIDE 0.05 G/100ML
SPRAY NASAL PRN
Status: DISCONTINUED | OUTPATIENT
Start: 2020-07-01 | End: 2020-07-01 | Stop reason: ALTCHOICE

## 2020-07-01 RX ORDER — POLYETHYLENE GLYCOL 3350 17 G/17G
17 POWDER, FOR SOLUTION ORAL DAILY PRN
Status: DISCONTINUED | OUTPATIENT
Start: 2020-07-01 | End: 2020-07-04 | Stop reason: HOSPADM

## 2020-07-01 RX ORDER — FENTANYL CITRATE 50 UG/ML
50 INJECTION, SOLUTION INTRAMUSCULAR; INTRAVENOUS EVERY 5 MIN PRN
Status: DISCONTINUED | OUTPATIENT
Start: 2020-07-01 | End: 2020-07-01 | Stop reason: HOSPADM

## 2020-07-01 RX ORDER — GLYCOPYRROLATE 0.2 MG/ML
0.2 INJECTION INTRAMUSCULAR; INTRAVENOUS ONCE
Status: DISCONTINUED | OUTPATIENT
Start: 2020-07-01 | End: 2020-07-01

## 2020-07-01 RX ORDER — GLYCOPYRROLATE 1 MG/5 ML
SYRINGE (ML) INTRAVENOUS PRN
Status: DISCONTINUED | OUTPATIENT
Start: 2020-07-01 | End: 2020-07-01 | Stop reason: SDUPTHER

## 2020-07-01 RX ORDER — PROMETHAZINE HYDROCHLORIDE 25 MG/ML
6.25 INJECTION, SOLUTION INTRAMUSCULAR; INTRAVENOUS EVERY 10 MIN PRN
Status: COMPLETED | OUTPATIENT
Start: 2020-07-01 | End: 2020-07-01

## 2020-07-01 RX ORDER — ONDANSETRON 2 MG/ML
4 INJECTION INTRAMUSCULAR; INTRAVENOUS
Status: DISCONTINUED | OUTPATIENT
Start: 2020-07-01 | End: 2020-07-04 | Stop reason: HOSPADM

## 2020-07-01 RX ORDER — ONDANSETRON 2 MG/ML
4 INJECTION INTRAMUSCULAR; INTRAVENOUS ONCE
Status: COMPLETED | OUTPATIENT
Start: 2020-07-01 | End: 2020-07-01

## 2020-07-01 RX ORDER — PROMETHAZINE HYDROCHLORIDE 25 MG/ML
6.25 INJECTION, SOLUTION INTRAMUSCULAR; INTRAVENOUS EVERY 6 HOURS PRN
Status: DISCONTINUED | OUTPATIENT
Start: 2020-07-01 | End: 2020-07-01 | Stop reason: SDUPTHER

## 2020-07-01 RX ORDER — MIDAZOLAM HYDROCHLORIDE 1 MG/ML
INJECTION INTRAMUSCULAR; INTRAVENOUS PRN
Status: DISCONTINUED | OUTPATIENT
Start: 2020-07-01 | End: 2020-07-01 | Stop reason: SDUPTHER

## 2020-07-01 RX ORDER — DEXAMETHASONE SODIUM PHOSPHATE 4 MG/ML
4 INJECTION, SOLUTION INTRA-ARTICULAR; INTRALESIONAL; INTRAMUSCULAR; INTRAVENOUS; SOFT TISSUE ONCE
Status: COMPLETED | OUTPATIENT
Start: 2020-07-01 | End: 2020-07-01

## 2020-07-01 RX ORDER — HYDRALAZINE HYDROCHLORIDE 20 MG/ML
10 INJECTION INTRAMUSCULAR; INTRAVENOUS EVERY 8 HOURS PRN
Status: DISCONTINUED | OUTPATIENT
Start: 2020-07-01 | End: 2020-07-04 | Stop reason: HOSPADM

## 2020-07-01 RX ORDER — ACETAMINOPHEN 325 MG/1
650 TABLET ORAL EVERY 4 HOURS PRN
Status: DISCONTINUED | OUTPATIENT
Start: 2020-07-01 | End: 2020-07-04 | Stop reason: HOSPADM

## 2020-07-01 RX ORDER — PROMETHAZINE HYDROCHLORIDE 25 MG/ML
INJECTION, SOLUTION INTRAMUSCULAR; INTRAVENOUS
Status: COMPLETED
Start: 2020-07-01 | End: 2020-07-01

## 2020-07-01 RX ORDER — SODIUM CHLORIDE, SODIUM LACTATE, POTASSIUM CHLORIDE, CALCIUM CHLORIDE 600; 310; 30; 20 MG/100ML; MG/100ML; MG/100ML; MG/100ML
INJECTION, SOLUTION INTRAVENOUS CONTINUOUS
Status: DISCONTINUED | OUTPATIENT
Start: 2020-07-01 | End: 2020-07-01

## 2020-07-01 RX ORDER — BISACODYL 10 MG
10 SUPPOSITORY, RECTAL RECTAL DAILY PRN
Status: DISCONTINUED | OUTPATIENT
Start: 2020-07-01 | End: 2020-07-04 | Stop reason: HOSPADM

## 2020-07-01 RX ORDER — HYDROCORTISONE 20 MG/1
20 TABLET ORAL 2 TIMES DAILY
Status: DISCONTINUED | OUTPATIENT
Start: 2020-07-01 | End: 2020-07-03

## 2020-07-01 RX ORDER — SODIUM CHLORIDE 0.9 % (FLUSH) 0.9 %
10 SYRINGE (ML) INJECTION PRN
Status: DISCONTINUED | OUTPATIENT
Start: 2020-07-01 | End: 2020-07-01

## 2020-07-01 RX ORDER — EPINEPHRINE NASAL SOLUTION 1 MG/ML
SOLUTION NASAL PRN
Status: DISCONTINUED | OUTPATIENT
Start: 2020-07-01 | End: 2020-07-01 | Stop reason: ALTCHOICE

## 2020-07-01 RX ORDER — LIDOCAINE HYDROCHLORIDE AND EPINEPHRINE 10; 10 MG/ML; UG/ML
INJECTION, SOLUTION INFILTRATION; PERINEURAL PRN
Status: DISCONTINUED | OUTPATIENT
Start: 2020-07-01 | End: 2020-07-01 | Stop reason: ALTCHOICE

## 2020-07-01 RX ORDER — SODIUM CHLORIDE 0.9 % (FLUSH) 0.9 %
10 SYRINGE (ML) INJECTION PRN
Status: DISCONTINUED | OUTPATIENT
Start: 2020-07-01 | End: 2020-07-04 | Stop reason: HOSPADM

## 2020-07-01 RX ORDER — NALOXONE HYDROCHLORIDE 0.4 MG/ML
0.2 INJECTION, SOLUTION INTRAMUSCULAR; INTRAVENOUS; SUBCUTANEOUS PRN
Status: DISCONTINUED | OUTPATIENT
Start: 2020-07-01 | End: 2020-07-04 | Stop reason: HOSPADM

## 2020-07-01 RX ORDER — HEPARIN SODIUM 5000 [USP'U]/.5ML
5000 INJECTION, SOLUTION INTRAVENOUS; SUBCUTANEOUS EVERY 8 HOURS
Status: DISCONTINUED | OUTPATIENT
Start: 2020-07-02 | End: 2020-07-01 | Stop reason: CLARIF

## 2020-07-01 RX ORDER — PROMETHAZINE HYDROCHLORIDE 25 MG/1
12.5 TABLET ORAL
Status: DISCONTINUED | OUTPATIENT
Start: 2020-07-01 | End: 2020-07-04 | Stop reason: HOSPADM

## 2020-07-01 RX ORDER — FENTANYL CITRATE 50 UG/ML
INJECTION, SOLUTION INTRAMUSCULAR; INTRAVENOUS PRN
Status: DISCONTINUED | OUTPATIENT
Start: 2020-07-01 | End: 2020-07-01 | Stop reason: SDUPTHER

## 2020-07-01 RX ORDER — SODIUM CHLORIDE 9 MG/ML
INJECTION, SOLUTION INTRAVENOUS CONTINUOUS
Status: DISCONTINUED | OUTPATIENT
Start: 2020-07-01 | End: 2020-07-04 | Stop reason: HOSPADM

## 2020-07-01 RX ORDER — SODIUM CHLORIDE 0.9 % (FLUSH) 0.9 %
10 SYRINGE (ML) INJECTION EVERY 12 HOURS SCHEDULED
Status: DISCONTINUED | OUTPATIENT
Start: 2020-07-01 | End: 2020-07-04 | Stop reason: HOSPADM

## 2020-07-01 RX ORDER — LIDOCAINE HYDROCHLORIDE 20 MG/ML
INJECTION, SOLUTION INTRAVENOUS PRN
Status: DISCONTINUED | OUTPATIENT
Start: 2020-07-01 | End: 2020-07-01 | Stop reason: SDUPTHER

## 2020-07-01 RX ORDER — ONDANSETRON 2 MG/ML
4 INJECTION INTRAMUSCULAR; INTRAVENOUS EVERY 6 HOURS PRN
Status: DISCONTINUED | OUTPATIENT
Start: 2020-07-01 | End: 2020-07-01

## 2020-07-01 RX ORDER — ENALAPRILAT 2.5 MG/2ML
1.25 INJECTION INTRAVENOUS
Status: COMPLETED | OUTPATIENT
Start: 2020-07-01 | End: 2020-07-01

## 2020-07-01 RX ORDER — HYDRALAZINE HYDROCHLORIDE 20 MG/ML
5 INJECTION INTRAMUSCULAR; INTRAVENOUS EVERY 5 MIN PRN
Status: DISCONTINUED | OUTPATIENT
Start: 2020-07-01 | End: 2020-07-01 | Stop reason: HOSPADM

## 2020-07-01 RX ORDER — OXYMETAZOLINE HYDROCHLORIDE 0.05 G/100ML
2 SPRAY NASAL EVERY 6 HOURS
Status: DISPENSED | OUTPATIENT
Start: 2020-07-01 | End: 2020-07-04

## 2020-07-01 RX ORDER — SENNA AND DOCUSATE SODIUM 50; 8.6 MG/1; MG/1
1 TABLET, FILM COATED ORAL 2 TIMES DAILY
Status: DISCONTINUED | OUTPATIENT
Start: 2020-07-01 | End: 2020-07-04 | Stop reason: HOSPADM

## 2020-07-01 RX ORDER — ROCURONIUM BROMIDE 10 MG/ML
INJECTION, SOLUTION INTRAVENOUS PRN
Status: DISCONTINUED | OUTPATIENT
Start: 2020-07-01 | End: 2020-07-01 | Stop reason: SDUPTHER

## 2020-07-01 RX ORDER — OXYCODONE HYDROCHLORIDE 5 MG/1
5 TABLET ORAL EVERY 4 HOURS PRN
Status: DISCONTINUED | OUTPATIENT
Start: 2020-07-01 | End: 2020-07-04 | Stop reason: HOSPADM

## 2020-07-01 RX ORDER — CEFAZOLIN SODIUM 2 G/50ML
2 SOLUTION INTRAVENOUS ONCE
Status: COMPLETED | OUTPATIENT
Start: 2020-07-01 | End: 2020-07-01

## 2020-07-01 RX ADMIN — CEFAZOLIN 2 G: 10 INJECTION, POWDER, FOR SOLUTION INTRAVENOUS at 16:20

## 2020-07-01 RX ADMIN — MIDAZOLAM HYDROCHLORIDE 2 MG: 2 INJECTION, SOLUTION INTRAMUSCULAR; INTRAVENOUS at 07:38

## 2020-07-01 RX ADMIN — FENTANYL CITRATE 50 MCG: 50 INJECTION INTRAMUSCULAR; INTRAVENOUS at 07:47

## 2020-07-01 RX ADMIN — SODIUM CHLORIDE, SODIUM LACTATE, POTASSIUM CHLORIDE, AND CALCIUM CHLORIDE: 600; 310; 30; 20 INJECTION, SOLUTION INTRAVENOUS at 09:12

## 2020-07-01 RX ADMIN — SODIUM CHLORIDE: 9 INJECTION, SOLUTION INTRAVENOUS at 10:22

## 2020-07-01 RX ADMIN — LABETALOL 20 MG/4 ML (5 MG/ML) INTRAVENOUS SYRINGE 5 MG: at 15:06

## 2020-07-01 RX ADMIN — FENTANYL CITRATE 50 MCG: 50 INJECTION INTRAMUSCULAR; INTRAVENOUS at 08:27

## 2020-07-01 RX ADMIN — Medication 120 MG: at 07:50

## 2020-07-01 RX ADMIN — HYDRALAZINE HYDROCHLORIDE 10 MG: 20 INJECTION INTRAMUSCULAR; INTRAVENOUS at 21:24

## 2020-07-01 RX ADMIN — ROCURONIUM BROMIDE 20 MG: 10 INJECTION, SOLUTION INTRAVENOUS at 08:46

## 2020-07-01 RX ADMIN — Medication 0.2 MG: at 08:21

## 2020-07-01 RX ADMIN — HYDROMORPHONE HYDROCHLORIDE 0.5 MG: 1 INJECTION, SOLUTION INTRAMUSCULAR; INTRAVENOUS; SUBCUTANEOUS at 12:21

## 2020-07-01 RX ADMIN — PROMETHAZINE HYDROCHLORIDE 6.25 MG: 25 INJECTION INTRAMUSCULAR; INTRAVENOUS at 10:39

## 2020-07-01 RX ADMIN — DOCUSATE SODIUM 50 MG AND SENNOSIDES 8.6 MG 1 TABLET: 8.6; 5 TABLET, FILM COATED ORAL at 21:30

## 2020-07-01 RX ADMIN — HYDRALAZINE HYDROCHLORIDE 10 MG: 20 INJECTION INTRAMUSCULAR; INTRAVENOUS at 15:49

## 2020-07-01 RX ADMIN — HYDROCORTISONE 20 MG: 20 TABLET ORAL at 21:28

## 2020-07-01 RX ADMIN — FENTANYL CITRATE 50 MCG: 50 INJECTION INTRAMUSCULAR; INTRAVENOUS at 07:52

## 2020-07-01 RX ADMIN — LABETALOL 20 MG/4 ML (5 MG/ML) INTRAVENOUS SYRINGE 5 MG: at 14:48

## 2020-07-01 RX ADMIN — LABETALOL 20 MG/4 ML (5 MG/ML) INTRAVENOUS SYRINGE 5 MG: at 14:38

## 2020-07-01 RX ADMIN — HYDROCORTISONE SODIUM SUCCINATE 100 MG: 100 INJECTION, POWDER, FOR SOLUTION INTRAMUSCULAR; INTRAVENOUS at 08:46

## 2020-07-01 RX ADMIN — Medication 2 SPRAY: at 21:29

## 2020-07-01 RX ADMIN — Medication 10 ML: at 21:28

## 2020-07-01 RX ADMIN — SALINE NASAL SPRAY 1 SPRAY: 1.5 SOLUTION NASAL at 15:20

## 2020-07-01 RX ADMIN — ENALAPRILAT 1.25 MG: 2.5 INJECTION INTRAVENOUS at 11:26

## 2020-07-01 RX ADMIN — PROMETHAZINE HYDROCHLORIDE 12.5 MG: 25 TABLET ORAL at 17:46

## 2020-07-01 RX ADMIN — DEXAMETHASONE SODIUM PHOSPHATE 4 MG: 4 INJECTION, SOLUTION INTRAMUSCULAR; INTRAVENOUS at 07:10

## 2020-07-01 RX ADMIN — SODIUM CHLORIDE: 9 INJECTION, SOLUTION INTRAVENOUS at 23:09

## 2020-07-01 RX ADMIN — LABETALOL 20 MG/4 ML (5 MG/ML) INTRAVENOUS SYRINGE 5 MG: at 12:09

## 2020-07-01 RX ADMIN — SUGAMMADEX 200 MG: 100 INJECTION, SOLUTION INTRAVENOUS at 09:57

## 2020-07-01 RX ADMIN — SALINE NASAL SPRAY 1 SPRAY: 1.5 SOLUTION NASAL at 17:26

## 2020-07-01 RX ADMIN — PROMETHAZINE HYDROCHLORIDE 6.25 MG: 25 INJECTION INTRAMUSCULAR; INTRAVENOUS at 10:50

## 2020-07-01 RX ADMIN — ROCURONIUM BROMIDE 5 MG: 10 INJECTION, SOLUTION INTRAVENOUS at 07:48

## 2020-07-01 RX ADMIN — FAMOTIDINE 20 MG: 10 INJECTION, SOLUTION INTRAVENOUS at 06:51

## 2020-07-01 RX ADMIN — SODIUM CHLORIDE, SODIUM LACTATE, POTASSIUM CHLORIDE, AND CALCIUM CHLORIDE: 600; 310; 30; 20 INJECTION, SOLUTION INTRAVENOUS at 06:48

## 2020-07-01 RX ADMIN — METOCLOPRAMIDE 10 MG: 5 INJECTION, SOLUTION INTRAMUSCULAR; INTRAVENOUS at 11:22

## 2020-07-01 RX ADMIN — SALINE NASAL SPRAY 1 SPRAY: 1.5 SOLUTION NASAL at 21:30

## 2020-07-01 RX ADMIN — ONDANSETRON 4 MG: 2 INJECTION INTRAMUSCULAR; INTRAVENOUS at 21:34

## 2020-07-01 RX ADMIN — CEFAZOLIN 2 G: 10 INJECTION, POWDER, FOR SOLUTION INTRAVENOUS at 23:57

## 2020-07-01 RX ADMIN — SODIUM CHLORIDE, POTASSIUM CHLORIDE, SODIUM LACTATE AND CALCIUM CHLORIDE: 600; 310; 30; 20 INJECTION, SOLUTION INTRAVENOUS at 06:15

## 2020-07-01 RX ADMIN — ONDANSETRON 4 MG: 2 INJECTION INTRAMUSCULAR; INTRAVENOUS at 15:58

## 2020-07-01 RX ADMIN — LIDOCAINE HYDROCHLORIDE 50 MG: 20 INJECTION, SOLUTION INTRAVENOUS at 07:47

## 2020-07-01 RX ADMIN — CEFAZOLIN SODIUM 2 G: 2 SOLUTION INTRAVENOUS at 07:37

## 2020-07-01 RX ADMIN — Medication 2 SPRAY: at 16:20

## 2020-07-01 RX ADMIN — PROPOFOL 150 MG: 10 INJECTION, EMULSION INTRAVENOUS at 07:48

## 2020-07-01 RX ADMIN — ROCURONIUM BROMIDE 45 MG: 10 INJECTION, SOLUTION INTRAVENOUS at 07:55

## 2020-07-01 RX ADMIN — ONDANSETRON 4 MG: 2 INJECTION INTRAMUSCULAR; INTRAVENOUS at 06:56

## 2020-07-01 RX ADMIN — ONDANSETRON 4 MG: 2 INJECTION INTRAMUSCULAR; INTRAVENOUS at 10:26

## 2020-07-01 ASSESSMENT — PULMONARY FUNCTION TESTS
PIF_VALUE: 20
PIF_VALUE: 19
PIF_VALUE: 19
PIF_VALUE: 20
PIF_VALUE: 14
PIF_VALUE: 20
PIF_VALUE: 19
PIF_VALUE: 19
PIF_VALUE: 20
PIF_VALUE: 1
PIF_VALUE: 20
PIF_VALUE: 19
PIF_VALUE: 19
PIF_VALUE: 20
PIF_VALUE: 20
PIF_VALUE: 22
PIF_VALUE: 1
PIF_VALUE: 20
PIF_VALUE: 19
PIF_VALUE: 20
PIF_VALUE: 7
PIF_VALUE: 20
PIF_VALUE: 20
PIF_VALUE: 14
PIF_VALUE: 20
PIF_VALUE: 19
PIF_VALUE: 21
PIF_VALUE: 20
PIF_VALUE: 14
PIF_VALUE: 21
PIF_VALUE: 24
PIF_VALUE: 1
PIF_VALUE: 1
PIF_VALUE: 20
PIF_VALUE: 15
PIF_VALUE: 20
PIF_VALUE: 14
PIF_VALUE: 19
PIF_VALUE: 1
PIF_VALUE: 19
PIF_VALUE: 20
PIF_VALUE: 1
PIF_VALUE: 19
PIF_VALUE: 20
PIF_VALUE: 20
PIF_VALUE: 1
PIF_VALUE: 2
PIF_VALUE: 20
PIF_VALUE: 20
PIF_VALUE: 14
PIF_VALUE: 20
PIF_VALUE: 1
PIF_VALUE: 20
PIF_VALUE: 14
PIF_VALUE: 19
PIF_VALUE: 10
PIF_VALUE: 14
PIF_VALUE: 20
PIF_VALUE: 19
PIF_VALUE: 20
PIF_VALUE: 19
PIF_VALUE: 19
PIF_VALUE: 20
PIF_VALUE: 20
PIF_VALUE: 2
PIF_VALUE: 23
PIF_VALUE: 1
PIF_VALUE: 20
PIF_VALUE: 1
PIF_VALUE: 19
PIF_VALUE: 20
PIF_VALUE: 20
PIF_VALUE: 19
PIF_VALUE: 19
PIF_VALUE: 20
PIF_VALUE: 14
PIF_VALUE: 20
PIF_VALUE: 14
PIF_VALUE: 1
PIF_VALUE: 20
PIF_VALUE: 19
PIF_VALUE: 2
PIF_VALUE: 20
PIF_VALUE: 14
PIF_VALUE: 19
PIF_VALUE: 20
PIF_VALUE: 19
PIF_VALUE: 1
PIF_VALUE: 20
PIF_VALUE: 20
PIF_VALUE: 1
PIF_VALUE: 19
PIF_VALUE: 24
PIF_VALUE: 19
PIF_VALUE: 20
PIF_VALUE: 14
PIF_VALUE: 19
PIF_VALUE: 20
PIF_VALUE: 19
PIF_VALUE: 20
PIF_VALUE: 19
PIF_VALUE: 15
PIF_VALUE: 19
PIF_VALUE: 20
PIF_VALUE: 19
PIF_VALUE: 20
PIF_VALUE: 19
PIF_VALUE: 20
PIF_VALUE: 20
PIF_VALUE: 15
PIF_VALUE: 20
PIF_VALUE: 21
PIF_VALUE: 20
PIF_VALUE: 1
PIF_VALUE: 20
PIF_VALUE: 19
PIF_VALUE: 20
PIF_VALUE: 20

## 2020-07-01 ASSESSMENT — PAIN SCALES - GENERAL
PAINLEVEL_OUTOF10: 0
PAINLEVEL_OUTOF10: 0
PAINLEVEL_OUTOF10: 8
PAINLEVEL_OUTOF10: 0
PAINLEVEL_OUTOF10: 0
PAINLEVEL_OUTOF10: 5

## 2020-07-01 ASSESSMENT — PAIN - FUNCTIONAL ASSESSMENT: PAIN_FUNCTIONAL_ASSESSMENT: 0-10

## 2020-07-01 NOTE — OP NOTE
Operative Report    PATIENT NAME: Sally Jordan  YOB: 1955  MEDICAL RECORD# [id number]  SURGERY DATE: 07/01/2020    SURGEON:  Pita London MD, PhD    Solange Bigger: Sara Hinojosa MD, PhD    PREOPERATIVE DIAGNOSIS:  1. Pituitary adenoma    POSTOPERATIVE DIAGNOSIS:  1. Pituitary adenoma    SURGICAL PROCEDURES PERFORMED:  1. Endonasal endoscopic approach to the anterior skull base for resection of pituitary adenoma  2. Intraoperative neuro navigation    ANESTHESIA:  General    INDICATIONS: Ms. Marylu Meehan is a 43-year-old woman who underwent a transsphenoidal resection   of a pituitary adenoma on 2/13/2013 by Dr. Bo Samano. Her imaging demonstrated progressive enlargement of her tumor residual. Given the size of the recurrence and the proximity to the optic chiasm, she elected to proceed with endoscopic endonasal approach for resection of the mass. All of the indications, benefits, risks and alternatives were described to the patient in detail. Risks included, but were not limited to bleeding, infection, loss of vision, inability to fully resect the mass, carotid injury, anesthetic risks, cerebrospinal fluid leak, worsened neurologic outcomes, seizure, stroke, coma and death. DETAILS OF PROCEDURE:     Under universal protocol and informed consent, the patient was brought to the operating room. General anesthesia was induced and the patient was intubated. She was positioned supine with the arms tucked. All pressure points were appropriately padded. The patient was then placed in Shannon horseshoe head jeffers in neutral position without head rotation. A small abdominal incision was also marked in the umbilicus. Ste[employer] Image Guidance was registered and verified by Dr. Barry Sanchez. Intravenous antibiotics (Ancef) and 100 mg Hydrocortisone were given by anesthesia. A time out was completed and the surgical sites were prepped and draped in the usual sterile fashion.     Dr. Barry Sanchez began the operation and his portion of the case will be dictated separately. Briefly, he reaccessed and expanded the bilateral sphenoidotomies to allow bimanual access to the sellar floor. Once the sphenoidotomy was completed, I verified our position at the sellar face with the neuronavigation probe and defined the margins of the sellar exposure. The sellar floor opening was then expanded with 2mm Kerrison punch forceps exposing the dura and previous fat graft. The dura, in turn, was opened in a cruciate fashion using an upgoing microscissor. The tumor was immediately encountered and was noted to be soft. It was progressively removed using suction and a combination of micro ring curettes. Samples of the tumor were sent for permanent histology. At the completion of the resection, the sella and right cavernous region were carefully inspected and no additional evidence of tumor was found. Additional tumor within the sphenoid sinus along the clival recess immediately inferior to the sellar opening was also resected with pituitary forceps. The normal pituitary gland was identified within the left aspect of the sella. Hemostasis was achieved using a combination of irrigation and floseal. No CSF egress was noted. At this point, a piece of DuraGen was inlayed epidurally into the sellar defect. Dr. Regina Wilder then completed the closure with nasal packing. All needle, sponge, and instrument counts were correct. The patient was extubated, transferred to a hospital bed, and taken to the PACU in stable condition. I affirm in accordance with CMS regulations that I was present and scrubbed for all critical portions of the case. ESTIMATED BLOOD LOSS: 75 mL    DRAINS: None. IMPLANTS: None. SPECIMEN:   1. Sellar mass permanent histopathology  2. Clival recess tissue for permanent histopathology    COMPLICATIONS: No complications apparent.     DISPOSITION: The patient was transferred to the PACU in stable condition,

## 2020-07-01 NOTE — BRIEF OP NOTE
Brief Postoperative Note      Patient: Deyanira Balderrama  YOB: 1955  MRN: [id number]    Date of Procedure: 7/1/2020    Pre-Op Diagnosis: Pituitary microadenoma ([address] [D35.2]    Post-Op Diagnosis: Same       Procedure(s):  NEUROENDOSCOPY WITH REMOVAL OF PITUITARY TUMOR TRANSPHENOIDAL, STEREOTACTIC COMPUTER ASSISTED SURGERY  REDO ENDOSCOPIC ENDONASAL TRANSPHENOIDAL RESECTION OF PITUITARY TUMOR    Surgeon(s):  MD Kishan Caldera MD    Assistant:  * No surgical staff found *    Anesthesia: General    Estimated Blood Loss (mL): less than 50     Complications: None    Specimens:   ID Type Source Tests Collected by Time Destination   A : CLIVAL RECESS MATERIAL Tissue Tissue SURGICAL PATHOLOGY Toni Sheikh MD 7/1/2020 0908    B Rosezena Masker MASS Tissue Tissue SURGICAL PATHOLOGY Toni Sheikh MD 7/1/2020 9093        Implants:  * No implants in log *      Drains: * No LDAs found *    Findings: tumor in right aspect of sella    Electronically signed by Kishan Finley MD on 7/1/2020 at 9:42 AM

## 2020-07-01 NOTE — ANESTHESIA POSTPROCEDURE EVALUATION
Department of Anesthesiology  Postprocedure Note    Patient: Janelle Meyers  MRN: [id number]  YOB: 1955  Date of evaluation: 7/1/2020  Time:  12:19 PM     Procedure Summary     Date:  07/01/20 Room / Location:  [address] [place] [address] [id number] / [facility]    Anesthesia Start:  6142 Anesthesia Stop:  1022    Procedures:       NEUROENDOSCOPY WITH REMOVAL OF PITUITARY TUMOR TRANSPHENOIDAL, STEREOTACTIC COMPUTER ASSISTED SURGERY (N/A )      REDO ENDOSCOPIC ENDONASAL TRANSPHENOIDAL RESECTION OF PITUITARY TUMOR (N/A ) Diagnosis:       Pituitary adenoma ([address]      (Pituitary microadenoma ([address] [D35.2])    Surgeon:  Jac Card MD; Jose C Heredia MD Responsible Provider:  Cherelle Haro MD    Anesthesia Type:  general ASA Status:  3          Anesthesia Type: general    Lena Phase I: Lena Score: 8    Lena Phase II:      Last vitals: Reviewed and per EMR flowsheets.        Anesthesia Post Evaluation    Patient location during evaluation: PACU  Patient participation: complete - patient participated  Level of consciousness: awake  Airway patency: patent  Nausea & Vomiting: no nausea and no vomiting  Complications: no  Cardiovascular status: hemodynamically stable  Respiratory status: acceptable  Hydration status: stable

## 2020-07-01 NOTE — OP NOTE
Operative Note      [facility]- HEAD & NECK SURGERY  OPERATIVE REPORT    Patient Name: Mery Caicedo  YOB: 1955  Medical Record Number:  [id number]  Billing Number:  [id number]  Date of Procedure: 7/1/2020  Time: 0730    Pre Operative Diagnoses:  recurrent pituitary adenoma. Post Operative Diagnoses:  same. Procedure:   1) Neuroendoscopy with removal of pituitary tumor endoscopic    2) Stereotactic computer assisted surgery           Surgeon: Maryuri Lee MD  OR Staff/ Assistant:  Circulator: Jairon Ahumada RN  Scrub Person First: Ervin Mosqueda  Scrub Person Second: Sunny John  Circulator Assist: Chey Randall RN  Anesthesia:  General anesthesia. Findings:      Indications:  Elmira Larose is a now 60 y.o. female with a history of pituitary adenoma resected several years ago. 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 . After discussing the risks, benefits and alternatives of the procedure, the patient agreed to proceed with surgery. Consent was obtained at that time. Anesthesia: General Endotracheal anesthesia    EBL:  50 cc    Findings: Pituitary tumor    Complications: None    Antibiotics:      Procedure: The patient came to the operating room and was placed in a supine position on the operating room table. After anesthesia was provided the anesthesiologist intubated the patient without difficulty. Approximately 7 cc of 1% lidocaine with 1:100,000 epinephrnie was injected into the middle turbinate, septum, and anterior wall of the sphenoid sinus. To allow a time for anesthesia and decongestant the patients Land liz CT was loaded onto the land liz device. The head  was placed on the patients forehead without pressure. The imaging was then registered to the patient in point to point fashion with a high degree of accuracy less than two millimeters.   The patient was then prepped and draped in routine fashion. Surgery began in the right nasal cavity. Utilizing a zero degree endoscope and a caudel elevator the middle and superior turbinate was lateralized in its inferior third. The natural ostium of the sphenoid sinus was identified and fractured medially and inferiorly. A large sphenoidotomy was created in the medial, inferior and lateral direction to the superior turbinate. Suction Bovie cautery was used to cauterize the posterior nasal artery, horizontally along the septal bone and vertical along the bone, cartilage interface of the septum. Attention was turned to the left nasal cavity. Utilizing a zero degree endoscope and a caudel elevator the middle and superior turbinate was lateralized in its inferior third. The natural ostium of the sphenoid sinus was identified and fractured medially and inferiorly. A large sphenoidotomy was created in the medial, inferior and lateral direction to the superior turbinate. Suction Bovie cautery was used to cauterize the posterior nasal artery, horizontally along the septal bone and vertical along the bone, cartilage interface of the septum. Mucosa was then elevated of the septal bone from anterior to posterior to the sphenoidotomies. This mucosa was removed preserving superior mucosa to cover bone and preserve olfaction and inferior mucosa to cover exposed septal bone. The underlying bone was harvested using endoscopic scissors to prevent injury to the skull base. This removed bone will be used by the neurosurgical service later in the case for closure of the sella. The sphenoidotomy was then widened inferior, lateral and superior to allow visualization of the optic nerves, carotid arteries, planum sphenoidale, tuberculum, sella and clival recess. The inter-sinus septum was then removed with true cut rongeurs and Kerrison rongeurs. The cut edges of the septal mucosa and sellar face was then cauterized with bipolar cautery.  The wound was then

## 2020-07-01 NOTE — PROGRESS NOTES
Pt's rosalba updated on pt's status, all questions answered at this time. Stated the doctor came out to talk to him and updated him also. Rosalba aware pt doesn't have a room yet.

## 2020-07-01 NOTE — CONSULTS
[facility] [place] HOSPITALISTS CONSULT NOTE    7/1/2020 1:20 PM    Patient Information: Nishant Bashir   Date of Admit:  7/1/2020  Primary Care Physician:  IVIS Adame CNP  Requesting Physician:  Amie Mahmood MD    Reason for consult:   Medical evaluation and recommendations for medical comorbidities      Chief complaint:  No chief complaint on file. History of Present Illness:  Nishant Bashir is a 60 y.o. female on Amie Mahmood MD service who was admitted on 7/1/2020 for neuro endoscopy with removal of pituitary tumor by neurosurgery and ENT. Hospitalist service is consulted for the management of medical comorbidities. She had elevated blood pressure in the PACU which improved with pain control as well as PRN IV antihypertensives. Currently patient is denying any chest pain no shortness of breath no headache. She will be transferred to the ICU for overnight observation. History obtained from patient and chart review  Old records reviewed; she has a past medical history of hypertension. She is on Maxide at home    REVIEW OF SYSTEMS:   Constitutional:  Negative for fever, chills or night sweats  Eyes:  Negative for exudate, itching  Ears:  Negative for tinnitus   Nose:  Negative for rhinorrhea, epistaxis  Mouth/Throat:  Negative for hoarseness, sore throat. Respiratory:   Negative for shortness of breath, wheezing  Cardiovascular: Negative for chest pain, palpitations   Gastrointestinal:  Negative for nausea, vomiting, diarrhea  Genitourinary:  Negative for polyuria, dysuria   Hematologic/Lymphatic:  Negative for  bleeding tendency, easy bruising  Musculoskeletal:  Negative for myalgias, arthralgias  Neurologic:  Negative for  confusion,dysarthria. Skin :  Negative for itching, rash  Psychiatric:  Negative for depression, anxiety.   Endocrine:  Negative for polydipsia, polyuria, heat Vivi Dangelo WBC 8.5 06/26/2020    RBC 3.96 06/26/2020    HGB 12.0 06/26/2020    HCT 36.9 06/26/2020    MCV 93.2 06/26/2020    MCH 30.2 06/26/2020    MCHC 32.4 06/26/2020    RDW 14.6 06/26/2020     06/26/2020    MPV 8.5 06/26/2020     BMP:    Lab Results   Component Value Date     06/26/2020    K 4.4 06/26/2020     06/26/2020    CO2 24 06/26/2020    BUN 7 06/26/2020    CREATININE 0.7 06/26/2020    CALCIUM 9.2 06/26/2020    GFRAA >60 06/26/2020    GFRAA >60 05/02/2013    LABGLOM >60 06/26/2020    GLUCOSE 104 06/26/2020         Problem List:    Active Problems:    Pituitary adenoma ([address]  Resolved Problems:    * No resolved hospital problems. *       Assessment & Plan:     1. Hypertension uncontrolled:-Okay to resume home medication when she is taking p.o. diet and IV antihypertensives. Control pain  With iv pain meds  2. s/ post endoscopic removal of pituitary tumor:- continue replacement  3. Post op N/ V:- iv prn antiemetics    Thank you for the opportunity to participate in the care of your patient.     Tana Mendoza MD   7/1/2020 1:20 PM

## 2020-07-01 NOTE — PLAN OF CARE
Problem: Falls - Risk of:  Goal: Will remain free from falls  Description: Will remain free from falls  Outcome: Ongoing  Note: Patient to unit from PACU. Resting in bed with side rails x 2. Bed in lowest position. Door to room open. Bed alert on, fall precautions in place. Daughter at bedside. Remains free from falls and injury throughout shift. Problem: Bleeding:  Goal: Will show no signs and symptoms of excessive bleeding  Description: Will show no signs and symptoms of excessive bleeding  Outcome: Ongoing  Note: Patient's hemoglobin this AM: No results for input(s): HGB in the last 72 hours. Patient's platelet count this AM: No results for input(s): PLT in the last 72 hours. Thrombocytopenia Precautions in place. Patient showing no signs or symptoms of active bleeding. Transfusion not indicated at this time. Patient verbalizes understanding of all instructions. Will continue to assess and implement POC. Call light within reach and hourly rounding in place. Slight nose bleed noted this shift. MD notified dressing in place, nose sprays administered as directed. No bleeding noted at this time. Problem: Skin Integrity:  Goal: Will show no infection signs and symptoms  Description: Will show no infection signs and symptoms  Outcome: Ongoing  Note: No skin breakdown noted this shift. Patient able to respostion self. Sacral heart intact. Will continue to monitor.

## 2020-07-01 NOTE — H&P
Patient is a 60 y.o. female with above specified procedure planned. 1.  Patient seen and focused exam done today- no new changes since last physical exam on   6/10/20    2. Access to ancillary services are available per request of the provider.     Mariette Phoenix, APRN - MARÍA     7/1/2020

## 2020-07-01 NOTE — PROGRESS NOTES
PACU Transfer Note    Vitals:    07/01/20 1515   BP: (!) 170/90   Pulse: 66   Resp: 11   Temp: 98.1 °F (36.7 °C)   SpO2: 100%       In: 383 [P.O.:25; I.V.:358]  Out: -     Pain assessment:  present - adequately treated  Pain Level: 5    Report given to Receiving unit RN.    7/1/2020 3:29 PM    BP currently being treated

## 2020-07-01 NOTE — ANESTHESIA PRE PROCEDURE
Department of Anesthesiology  Preprocedure Note       Name:  Manuel Lieberman   Age:  60 y.o.  :  1955                                          MRN:  [id number]         Date:  2020      Surgeon: Chad Burns):  Vivienne Rushing, MD Landy Hashimoto, MD    Procedure: Procedure(s):  NEUROENDOSCOPY WITH REMOVAL OF PITUITARY TUMOR TRANSPHENOIDAL, STEREOTACTIC COMPUTER ASSISTED SURGERY  REDO ENDOSCOPIC ENDONASAL TRANSPHENOIDAL RESECTION OF PITUITARY TUMOR, POSSIBLE LUMBAR DRAIN, POSSIBLE ABDOMINAL FAT GRAFT    Medications prior to admission:   Prior to Admission medications    Medication Sig Start Date End Date Taking?  Authorizing Provider   ibuprofen (ADVIL;MOTRIN) 600 MG tablet Take 600 mg by mouth as needed for Pain   Yes Historical Provider, MD   triamterene-hydrochlorothiazide (MAXZIDE) 75-50 MG per tablet Take 1 tablet by mouth daily 6/10/20 6/10/21 Yes Laymon Neat, APRN - CNP   fluticasone (FLONASE) 50 MCG/ACT nasal spray 2 sprays by Nasal route 17   Historical Provider, MD       Current medications:    Current Facility-Administered Medications   Medication Dose Route Frequency Provider Last Rate Last Dose    ceFAZolin (ANCEF) 2 g in dextrose 3 % 50 mL IVPB (duplex)  2 g Intravenous Once Landy Hashimoto, MD        lactated ringers infusion   Intravenous Continuous [facility],  mL/hr at 20 0615      lactated ringers infusion   Intravenous Continuous Charisse Eli MD        sodium chloride flush 0.9 % injection 10 mL  10 mL Intravenous 2 times per day Charisse Eli MD        sodium chloride flush 0.9 % injection 10 mL  10 mL Intravenous PRN Charisse Eli MD        lidocaine PF 1 % injection 1 mL  1 mL Intradermal Once PRN Charisse Eli MD        glycopyrrolate (ROBINUL) injection 0.2 mg  0.2 mg Intravenous Once Charisse Eli MD        ondansetron [facility]) injection 4 mg  4 mg Intravenous Once Charisse Eli MD        famotidine (PEPCID) injection 20 mg  20 mg Intravenous Once Bhavin Ceballos MD        dexamethasone (DECADRON) injection 4 mg  4 mg Intravenous Once Bhavin Ceballos MD           Allergies: Allergies   Allergen Reactions    Metoprolol Nausea Only    Macrobid [Nitrofurantoin] Nausea And Vomiting       Problem List:    Patient Active Problem List   Diagnosis Code    Fatigue R53.83    Anxiety F41.9    Vitamin D deficiency E55.9    Gastroesophageal reflux disease K21.9    Mass of hand R22.30    Neoplastic disease D49.9    Swelling, mass, or lump in head and neck R22.0, R22.1    Pituitary adenoma ([facility] D35.2    Mixed hyperlipidemia E78.2    Essential hypertension I10    Allergic rhinitis due to pollen J30.1    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 (HCC) D35.2, D35.3    Overweight E66.3       Past Medical History:        Diagnosis Date    Allergic rhinitis     Chicken pox     GERD (gastroesophageal reflux disease)     Hypertension     Kidney stone     Pituitary adenoma ([facility]     Pituitary microadenoma [facility])        Past Surgical History:        Procedure Laterality Date    CYST REMOVAL Left 9/24/13    EXCSION OF LEFT HAND DORSAL GANGLION CYST    HYSTERECTOMY      PITUITARY SURGERY  2013    CARRILLO AND BSO         Social History:    Social History     Tobacco Use    Smoking status: Never Smoker    Smokeless tobacco: Never Used   Substance Use Topics    Alcohol use:  Yes     Alcohol/week: 1.0 standard drinks     Types: 1 Glasses of wine per week     Comment: 2-3 glasses wine/week                                Counseling given: Not Answered      Vital Signs (Current):   Vitals:    06/25/20 1427 07/01/20 0555 07/01/20 0604   BP:   130/80   Pulse:   78   Resp:   18   Temp:   98.1 °F (36.7 °C)   TempSrc:   Oral   SpO2:   98%   Weight: 187 lb (84.8 kg) 187 lb (84.8 kg)    Height: 5' 7\" (1.702 m) 5' 7\" (1.702 m)                                               BP Readings from Last 3 Encounters:   07/01/20 130/80 06/17/20 136/86   06/10/20 (!) 138/94       NPO Status: Time of last liquid consumption: 1930                        Time of last solid consumption: 1930                        Date of last liquid consumption: 06/30/20                        Date of last solid food consumption: 06/30/20    BMI:   Wt Readings from Last 3 Encounters:   07/01/20 187 lb (84.8 kg)   06/17/20 187 lb (84.8 kg)   06/10/20 195 lb (88.5 kg)     Body mass index is 29.29 kg/m².     CBC:   Lab Results   Component Value Date    WBC 8.5 06/26/2020    RBC 3.96 06/26/2020    HGB 12.0 06/26/2020    HCT 36.9 06/26/2020    MCV 93.2 06/26/2020    RDW 14.6 06/26/2020     06/26/2020       CMP:   Lab Results   Component Value Date     06/26/2020    K 4.4 06/26/2020     06/26/2020    CO2 24 06/26/2020    BUN 7 06/26/2020    CREATININE 0.7 06/26/2020    GFRAA >60 06/26/2020    GFRAA >60 05/02/2013    AGRATIO 1.9 01/27/2017    LABGLOM >60 06/26/2020    GLUCOSE 104 06/26/2020    PROT 7.0 01/27/2017    PROT 7.7 02/03/2013    CALCIUM 9.2 06/26/2020    BILITOT 0.4 01/27/2017    ALKPHOS 139 01/27/2017    AST 18 01/27/2017    ALT 16 01/27/2017       POC Tests:   Recent Labs     07/01/20  0628   POCGLU 120*       Coags:   Lab Results   Component Value Date    PROTIME 11.9 06/26/2020    INR 1.03 06/26/2020    APTT 31.9 06/26/2020       HCG (If Applicable): No results found for: PREGTESTUR, PREGSERUM, HCG, HCGQUANT     ABGs: No results found for: PHART, PO2ART, IQJ7WKX, GUI2DDT, BEART, L2HDFJQQ     Type & Screen (If Applicable):  No results found for: LABABO, LABRH    Drug/Infectious Status (If Applicable):  No results found for: HIV, HEPCAB    COVID-19 Screening (If Applicable):   Lab Results   Component Value Date    COVID19 Not Detected 06/25/2020         Anesthesia Evaluation  Patient summary reviewed no history of anesthetic complications:   Airway: Mallampati: III  TM distance: >3 FB   Neck ROM: full  Mouth opening: > = 3 FB Dental: normal exam         Pulmonary:                              Cardiovascular:    (+) hypertension:,                   Neuro/Psych:   (+) depression/anxiety             GI/Hepatic/Renal:   (+) GERD:, renal disease: kidney stones,           Endo/Other:                      ROS comment: Pituitary adenoma Abdominal:           Vascular:                                        Anesthesia Plan      general     ASA 3     (Agree to A-line if needed)  Induction: intravenous. Anesthetic plan and risks discussed with patient.                       Mariano Saleh MD   7/1/2020

## 2020-07-01 NOTE — PROGRESS NOTES
Pt having nausea and starting to dry heave no additional PRN nausea medications available to be administered. Pt also developed a nose bleed, minimal bleeding noted, has stopped at this time. Neurosurgeon, Dr. Dion Mi, notified. Hospitalist at bedside now, informed on above findings and concerns. Medications to be placed per hospitalist.     Will continue to monitor. Dr. Dion Mi returned page. Orders to change antimetic order to q3 prn to alternate link antiemetics as ordered, see MAR. Order to place \"mustache\" dressing for nasal drainage.

## 2020-07-01 NOTE — PROGRESS NOTES
Patient admitted to ICU via stretcher from PACU for diagnosis of pituitary tumor removal.      Patient and adult daughter oriented to patient room including call light and bed controls. Admission assessment completed - see admission flowsheet documentation. Patient is a high fall risk. Safety measures instituted per policy. Patient and adult daughter oriented to unit policies and procedures including: pain management practices, unit safety precautions, family rapid response, q4h vital signs and assessments, and lab draws. Also discussed use of call light and how to get in touch with nursing staff. Stressed the importance of calling out immediately for any changes in condition including but not limited to: pain, chills, fever, nausea, vomiting, diarrhea, chest pain, sob/mendosa, assistance with toileting, bleeding, or any other symptoms that are out of the ordinary for the patient. Patient verbalizes understanding of all instructions and will call for assistance as needed.

## 2020-07-01 NOTE — FLOWSHEET NOTE
07/01/20 1720   Encounter Summary   Services provided to: Patient   Referral/Consult From: Rounding   Continue Visiting   (7/1, hue )   Complexity of Encounter Moderate   Length of Encounter 15 minutes   Routine   Type Initial   Assessment Calm; Approachable; Hopeful   Intervention Active listening;Explored feelings, thoughts, concerns;Nurtured hope   Outcome Comfort;Expressed gratitude; Refused/declined   Staff Lisa Gage MA

## 2020-07-02 ENCOUNTER — APPOINTMENT (OUTPATIENT)
Dept: MRI IMAGING | Age: 65
DRG: 614 | End: 2020-07-02
Attending: OTOLARYNGOLOGY
Payer: COMMERCIAL

## 2020-07-02 LAB
ANION GAP SERPL CALCULATED.3IONS-SCNC: 10 MMOL/L (ref 3–16)
ANION GAP SERPL CALCULATED.3IONS-SCNC: 11 MMOL/L (ref 3–16)
ANION GAP SERPL CALCULATED.3IONS-SCNC: 8 MMOL/L (ref 3–16)
BILIRUBIN URINE: NEGATIVE
BLOOD, URINE: NEGATIVE
BUN BLDV-MCNC: 12 MG/DL (ref 7–20)
BUN BLDV-MCNC: 13 MG/DL (ref 7–20)
BUN BLDV-MCNC: 9 MG/DL (ref 7–20)
CALCIUM SERPL-MCNC: 8.2 MG/DL (ref 8.3–10.6)
CALCIUM SERPL-MCNC: 9.1 MG/DL (ref 8.3–10.6)
CALCIUM SERPL-MCNC: 9.2 MG/DL (ref 8.3–10.6)
CHLORIDE BLD-SCNC: 107 MMOL/L (ref 99–110)
CHLORIDE BLD-SCNC: 108 MMOL/L (ref 99–110)
CHLORIDE BLD-SCNC: 111 MMOL/L (ref 99–110)
CLARITY: CLEAR
CO2: 22 MMOL/L (ref 21–32)
CO2: 23 MMOL/L (ref 21–32)
CO2: 24 MMOL/L (ref 21–32)
COLOR: YELLOW
CORTISOL TOTAL: 17.9 UG/DL
CREAT SERPL-MCNC: 0.8 MG/DL (ref 0.6–1.2)
CREAT SERPL-MCNC: 1 MG/DL (ref 0.6–1.2)
CREAT SERPL-MCNC: 1.1 MG/DL (ref 0.6–1.2)
EPITHELIAL CELLS, UA: NORMAL /HPF (ref 0–5)
GFR AFRICAN AMERICAN: >60
GFR NON-AFRICAN AMERICAN: 50
GFR NON-AFRICAN AMERICAN: 56
GFR NON-AFRICAN AMERICAN: >60
GLUCOSE BLD-MCNC: 116 MG/DL (ref 70–99)
GLUCOSE BLD-MCNC: 120 MG/DL (ref 70–99)
GLUCOSE BLD-MCNC: 134 MG/DL (ref 70–99)
GLUCOSE URINE: NEGATIVE MG/DL
HCT VFR BLD CALC: 34.4 % (ref 36–48)
HEMOGLOBIN: 11.1 G/DL (ref 12–16)
KETONES, URINE: NEGATIVE MG/DL
LEUKOCYTE ESTERASE, URINE: ABNORMAL
LEUKOCYTE ESTERASE, URINE: NEGATIVE
MCH RBC QN AUTO: 30.4 PG (ref 26–34)
MCHC RBC AUTO-ENTMCNC: 32.3 G/DL (ref 31–36)
MCV RBC AUTO: 94 FL (ref 80–100)
MICROSCOPIC EXAMINATION: NORMAL
MICROSCOPIC EXAMINATION: YES
NITRITE, URINE: NEGATIVE
OSMOLALITY URINE: 317 MOSM/KG (ref 390–1070)
OSMOLALITY URINE: 568 MOSM/KG (ref 390–1070)
OSMOLALITY URINE: 641 MOSM/KG (ref 390–1070)
OSMOLALITY URINE: 95 MOSM/KG (ref 390–1070)
OSMOLALITY: 289 MOSM/KG (ref 278–305)
OSMOLALITY: 302 MOSM/KG (ref 278–305)
PDW BLD-RTO: 15 % (ref 12.4–15.4)
PH UA: 6.5 (ref 5–8)
PH UA: 7 (ref 5–8)
PLATELET # BLD: 320 K/UL (ref 135–450)
PMV BLD AUTO: 8 FL (ref 5–10.5)
POTASSIUM SERPL-SCNC: 3.5 MMOL/L (ref 3.5–5.1)
POTASSIUM SERPL-SCNC: 3.7 MMOL/L (ref 3.5–5.1)
POTASSIUM SERPL-SCNC: 3.7 MMOL/L (ref 3.5–5.1)
PROLACTIN: 5.9 NG/ML
PROTEIN UA: ABNORMAL MG/DL
PROTEIN UA: NEGATIVE MG/DL
RBC # BLD: 3.66 M/UL (ref 4–5.2)
RBC UA: NORMAL /HPF (ref 0–4)
RENAL EPITHELIAL, UA: NORMAL /HPF (ref 0–1)
SODIUM BLD-SCNC: 136 MMOL/L (ref 136–145)
SODIUM BLD-SCNC: 141 MMOL/L (ref 136–145)
SODIUM BLD-SCNC: 141 MMOL/L (ref 136–145)
SODIUM BLD-SCNC: 142 MMOL/L (ref 136–145)
SPECIFIC GRAVITY UA: 1.01 (ref 1–1.03)
SPECIFIC GRAVITY UA: 1.01 (ref 1–1.03)
SPECIFIC GRAVITY UA: 1.02 (ref 1–1.03)
SPECIFIC GRAVITY UA: <=1.005 (ref 1–1.03)
TSH REFLEX FT4: 0.54 UIU/ML (ref 0.27–4.2)
URINE TYPE: ABNORMAL
URINE TYPE: NORMAL
UROBILINOGEN, URINE: 0.2 E.U./DL
WBC # BLD: 16.8 K/UL (ref 4–11)
WBC UA: NORMAL /HPF (ref 0–5)

## 2020-07-02 PROCEDURE — 6360000002 HC RX W HCPCS: Performed by: NEUROLOGICAL SURGERY

## 2020-07-02 PROCEDURE — 97161 PT EVAL LOW COMPLEX 20 MIN: CPT

## 2020-07-02 PROCEDURE — 83930 ASSAY OF BLOOD OSMOLALITY: CPT

## 2020-07-02 PROCEDURE — 6360000004 HC RX CONTRAST MEDICATION: Performed by: NEUROLOGICAL SURGERY

## 2020-07-02 PROCEDURE — 97530 THERAPEUTIC ACTIVITIES: CPT

## 2020-07-02 PROCEDURE — 81003 URINALYSIS AUTO W/O SCOPE: CPT

## 2020-07-02 PROCEDURE — A9579 GAD-BASE MR CONTRAST NOS,1ML: HCPCS | Performed by: NEUROLOGICAL SURGERY

## 2020-07-02 PROCEDURE — 36415 COLL VENOUS BLD VENIPUNCTURE: CPT

## 2020-07-02 PROCEDURE — 83935 ASSAY OF URINE OSMOLALITY: CPT

## 2020-07-02 PROCEDURE — 97535 SELF CARE MNGMENT TRAINING: CPT

## 2020-07-02 PROCEDURE — 83735 ASSAY OF MAGNESIUM: CPT

## 2020-07-02 PROCEDURE — 84443 ASSAY THYROID STIM HORMONE: CPT

## 2020-07-02 PROCEDURE — 2580000003 HC RX 258: Performed by: NEUROLOGICAL SURGERY

## 2020-07-02 PROCEDURE — 97165 OT EVAL LOW COMPLEX 30 MIN: CPT

## 2020-07-02 PROCEDURE — 81001 URINALYSIS AUTO W/SCOPE: CPT

## 2020-07-02 PROCEDURE — 97116 GAIT TRAINING THERAPY: CPT

## 2020-07-02 PROCEDURE — 84305 ASSAY OF SOMATOMEDIN: CPT

## 2020-07-02 PROCEDURE — 84146 ASSAY OF PROLACTIN: CPT

## 2020-07-02 PROCEDURE — 6370000000 HC RX 637 (ALT 250 FOR IP): Performed by: NURSE PRACTITIONER

## 2020-07-02 PROCEDURE — 82533 TOTAL CORTISOL: CPT

## 2020-07-02 PROCEDURE — 6370000000 HC RX 637 (ALT 250 FOR IP): Performed by: NEUROLOGICAL SURGERY

## 2020-07-02 PROCEDURE — 94761 N-INVAS EAR/PLS OXIMETRY MLT: CPT

## 2020-07-02 PROCEDURE — 2000000000 HC ICU R&B

## 2020-07-02 PROCEDURE — 70553 MRI BRAIN STEM W/O & W/DYE: CPT

## 2020-07-02 PROCEDURE — 80048 BASIC METABOLIC PNL TOTAL CA: CPT

## 2020-07-02 PROCEDURE — 85027 COMPLETE CBC AUTOMATED: CPT

## 2020-07-02 PROCEDURE — 6370000000 HC RX 637 (ALT 250 FOR IP): Performed by: STUDENT IN AN ORGANIZED HEALTH CARE EDUCATION/TRAINING PROGRAM

## 2020-07-02 PROCEDURE — 6360000002 HC RX W HCPCS: Performed by: INTERNAL MEDICINE

## 2020-07-02 RX ORDER — FAMOTIDINE 20 MG/1
20 TABLET, FILM COATED ORAL 2 TIMES DAILY
Status: DISCONTINUED | OUTPATIENT
Start: 2020-07-02 | End: 2020-07-04 | Stop reason: HOSPADM

## 2020-07-02 RX ORDER — DESMOPRESSIN ACETATE 4 UG/ML
1 INJECTION, SOLUTION INTRAVENOUS; SUBCUTANEOUS ONCE
Status: DISCONTINUED | OUTPATIENT
Start: 2020-07-02 | End: 2020-07-04 | Stop reason: HOSPADM

## 2020-07-02 RX ORDER — NIFEDIPINE 30 MG/1
30 TABLET, FILM COATED, EXTENDED RELEASE ORAL 2 TIMES DAILY
Status: DISCONTINUED | OUTPATIENT
Start: 2020-07-02 | End: 2020-07-04 | Stop reason: HOSPADM

## 2020-07-02 RX ADMIN — HYDROCORTISONE 20 MG: 20 TABLET ORAL at 08:02

## 2020-07-02 RX ADMIN — CEFAZOLIN 2 G: 10 INJECTION, POWDER, FOR SOLUTION INTRAVENOUS at 16:01

## 2020-07-02 RX ADMIN — SALINE NASAL SPRAY 1 SPRAY: 1.5 SOLUTION NASAL at 18:00

## 2020-07-02 RX ADMIN — SALINE NASAL SPRAY 1 SPRAY: 1.5 SOLUTION NASAL at 23:04

## 2020-07-02 RX ADMIN — SALINE NASAL SPRAY 1 SPRAY: 1.5 SOLUTION NASAL at 06:43

## 2020-07-02 RX ADMIN — HYDROCORTISONE 20 MG: 20 TABLET ORAL at 22:28

## 2020-07-02 RX ADMIN — HYDRALAZINE HYDROCHLORIDE 10 MG: 20 INJECTION INTRAMUSCULAR; INTRAVENOUS at 17:37

## 2020-07-02 RX ADMIN — FAMOTIDINE 20 MG: 20 TABLET ORAL at 16:00

## 2020-07-02 RX ADMIN — ACETAMINOPHEN 650 MG: 325 TABLET ORAL at 19:46

## 2020-07-02 RX ADMIN — NIFEDIPINE 30 MG: 30 TABLET, EXTENDED RELEASE ORAL at 19:46

## 2020-07-02 RX ADMIN — GADOTERIDOL 15 ML: 279.3 INJECTION, SOLUTION INTRAVENOUS at 10:49

## 2020-07-02 RX ADMIN — ACETAMINOPHEN 650 MG: 325 TABLET ORAL at 08:09

## 2020-07-02 RX ADMIN — Medication 2 SPRAY: at 08:03

## 2020-07-02 RX ADMIN — Medication 2 SPRAY: at 22:29

## 2020-07-02 RX ADMIN — Medication 10 ML: at 08:02

## 2020-07-02 RX ADMIN — CEFAZOLIN 2 G: 10 INJECTION, POWDER, FOR SOLUTION INTRAVENOUS at 09:35

## 2020-07-02 RX ADMIN — HEPARIN SODIUM 5000 UNITS: 5000 INJECTION INTRAVENOUS; SUBCUTANEOUS at 21:53

## 2020-07-02 RX ADMIN — Medication 2 SPRAY: at 16:01

## 2020-07-02 RX ADMIN — DOCUSATE SODIUM 50 MG AND SENNOSIDES 8.6 MG 1 TABLET: 8.6; 5 TABLET, FILM COATED ORAL at 08:02

## 2020-07-02 RX ADMIN — Medication 10 ML: at 22:31

## 2020-07-02 RX ADMIN — SALINE NASAL SPRAY 1 SPRAY: 1.5 SOLUTION NASAL at 09:35

## 2020-07-02 RX ADMIN — DOCUSATE SODIUM 50 MG AND SENNOSIDES 8.6 MG 1 TABLET: 8.6; 5 TABLET, FILM COATED ORAL at 19:47

## 2020-07-02 RX ADMIN — FAMOTIDINE 20 MG: 20 TABLET ORAL at 19:47

## 2020-07-02 ASSESSMENT — PAIN DESCRIPTION - DESCRIPTORS: DESCRIPTORS: DISCOMFORT

## 2020-07-02 ASSESSMENT — PAIN SCALES - GENERAL
PAINLEVEL_OUTOF10: 0
PAINLEVEL_OUTOF10: 0
PAINLEVEL_OUTOF10: 2
PAINLEVEL_OUTOF10: 3

## 2020-07-02 ASSESSMENT — PAIN DESCRIPTION - ORIENTATION: ORIENTATION: MID;ANTERIOR

## 2020-07-02 ASSESSMENT — PAIN DESCRIPTION - ONSET: ONSET: GRADUAL

## 2020-07-02 ASSESSMENT — PAIN DESCRIPTION - PAIN TYPE: TYPE: ACUTE PAIN

## 2020-07-02 ASSESSMENT — PAIN DESCRIPTION - PROGRESSION: CLINICAL_PROGRESSION: GRADUALLY IMPROVING

## 2020-07-02 ASSESSMENT — PAIN - FUNCTIONAL ASSESSMENT: PAIN_FUNCTIONAL_ASSESSMENT: ACTIVITIES ARE NOT PREVENTED

## 2020-07-02 ASSESSMENT — PAIN DESCRIPTION - FREQUENCY: FREQUENCY: INTERMITTENT

## 2020-07-02 ASSESSMENT — PAIN DESCRIPTION - LOCATION: LOCATION: HEAD

## 2020-07-02 NOTE — PROGRESS NOTES
Per Dr. Damir López with nephrology, 30mg extended release nifedipine 2X per day ordered. Will monitor.

## 2020-07-02 NOTE — PROGRESS NOTES
10mg hydralizine given for BP of 195/108. NP with neurosurgery perfect served about re-starting home medication for BP. Asked to see what nephrology recommended. Will monitor.

## 2020-07-02 NOTE — PROGRESS NOTES
Occupational Therapy   Occupational Therapy Initial Assessment, Treatment and D/c Note   Date: 2020   Patient Name: Mery Caicedo  MRN: [id number]     : 1955    Date of Service: 2020    Discharge Recommendations:Vanessa HEATH Kimball scored a  on the -[facility] ADL Inpatient form. At this time, no further OT is recommended upon discharge. Recommend patient returns to prior setting with prior services. OT Equipment Recommendations  Equipment Needed: No    Assessment   Assessment: Pt from home - demo functioning at baseline level. Pt is independent with functional transfers/ mobility and ADLs. No Acute OT needs. No DME needs. Will sign off from OT services. Pt hopeful for d/c home today. Prognosis: Good  Decision Making: Low Complexity  OT Education: OT Role;Plan of Care;IADL Safety  Patient Education: Home safety / precautions-- verb understanding   No Skilled OT: Independent with functional mobility; Independent with ADL's;Safe to return home; No OT goals identified  REQUIRES OT FOLLOW UP: No  Activity Tolerance  Activity Tolerance: Patient Tolerated treatment well  Activity Tolerance: Slight SOB after walking a full lap. O2 sat 100% on RA   Safety Devices  Safety Devices in place: Yes  Type of devices: Call light within reach; Left in chair;Nurse notified; Chair alarm in place(no box in room - RN aware )           Patient Diagnosis(es): The encounter diagnosis was Pituitary adenoma ([address] has a past medical history of Allergic rhinitis, Chicken pox, GERD (gastroesophageal reflux disease), Hypertension, Kidney stone, Pituitary adenoma ([address] and Pituitary microadenoma ([address] has a past surgical history that includes Hysterectomy; carlos and bso (cervix removed); pituitary surgery (); cyst removal (Left, 13); Nasal sinus surgery (N/A, 2020); and craniotomy (N/A, 2020).            Restrictions  Position Activity Restriction  Other position/activity restrictions: up as tolerated family present when showering initially - verb understanding    ADL  Feeding: Independent  Grooming: Independent(stance at sink to wash face / brush teeth )  LE Dressing: Setup(pt edu on sitting for safety and no bending over 2/2 sx.  verb understanding )  Toileting: Independent; Modified independent (demo -- pt has galvan in place.  )  Tone RUE  RUE Tone: Normotonic  Tone LUE  LUE Tone: Normotonic  Coordination  Movements Are Fluid And Coordinated: Yes     Bed mobility  Supine to Sit: Modified independent  Transfers  Sit to stand: Independent  Stand to sit:  Independent  Vision - Basic Assessment  Prior Vision: No visual deficits  Cognition  Overall Cognitive Status: WNL    LUE AROM (degrees)  LUE AROM : WNL  Left Hand AROM (degrees)  Left Hand AROM: WNL  RUE AROM (degrees)  RUE AROM : WNL  Right Hand AROM (degrees)  Right Hand AROM: WNL  LUE Strength  Gross LUE Strength: WFL  L Hand General: 4+/5  RUE Strength  Gross RUE Strength: WFL  R Hand General: 4+/5     Hand Dominance  Hand Dominance: Right           Plan D/c Acute OT services     AM-PAC Score  AM-[facility] Inpatient Daily Activity Raw Score: 24 (07/02/20 0918)  AM-PAC Inpatient ADL T-Scale Score : 57.54 (07/02/20 0918)  ADL Inpatient CMS 0-100% Score: 0 (07/02/20 0918)  ADL Inpatient CMS G-Code Modifier : [facility] (07/02/20 9599)    Therapy Time   Individual Concurrent Group Co-treatment   Time In 0827         Time Out 0907         Minutes 40              Timed Code Treatment Minutes:   23 mins     Total Treatment Minutes:  40 mins       Murali Camarena OT

## 2020-07-02 NOTE — PROGRESS NOTES
UO > 250cc/hr for two consecutive hours. Dr. Vivi Dangelo notified. Labs sent. No new orders at this time. Will continue to monitor.

## 2020-07-02 NOTE — PROGRESS NOTES
NEUROSURGERY POST-OP PROGRESS NOTE    Patient Name: Glo Bryant YOB: 1955   Sex: Female Age: 60 yrs     Medical Record Number: [id number] Acct Number: [de-identified]   Room Number: 7551/8295-01 Hospital Day: Hospital Day: 2     Interval History:  Post-operative Day# 1 s/p Pituitary tumor resection    Subjective: Doing well this morning. Denies any drainage from nose, denies any new headaches or pain    Objective:    VITAL SIGNS   BP (!) 143/77   Pulse 90   Temp 98.4 °F (36.9 °C) (Axillary)   Resp 18   Ht 5' 7\" (1.702 m)   Wt 187 lb (84.8 kg)   SpO2 97%   BMI 29.29 kg/m²    Height Height: 5' 7\" (170.2 cm)   Weight Weight: 187 lb (84.8 kg)        Allergies Allergies   Allergen Reactions    Metoprolol Nausea Only    Macrobid [Nitrofurantoin] Nausea And Vomiting      NPO Status DIET GENERAL; No Drinking Straw   Isolation No active isolations     LABS   Basic Metabolic Profile Recent Labs     07/01/20  1804 07/01/20  2350 07/02/20  0457    136 141   CL  --   --  111*   CO2  --   --  22   BUN  --   --  9   CREATININE  --   --  0.8   GLUCOSE  --   --  120*      Complete Blood Count Recent Labs     07/02/20  0457   WBC 16.8*   RBC 3.66*      Coagulation Studies No results for input(s): PTT, INR in the last 72 hours.     Invalid input(s): PLATELETS, PROA, PT, PTTA     Urine osmo 568 - 95 @ 5am  Serum osmo 289 - 302 @ 10am    MEDICATIONS   Inpatient Medications     sodium chloride flush, 10 mL, Intravenous, 2 times per day    hydrocortisone, 20 mg, Oral, BID    sennosides-docusate sodium, 1 tablet, Oral, BID    oxymetazoline, 2 spray, Each Nostril, Q6H    sodium chloride, 1 spray, Each Nostril, Q4H While awake    heparin (porcine), 5,000 Units, Subcutaneous, Q8H    ceFAZolin (ANCEF) IVPB, 2 g, Intravenous, Q8H   Infusions    sodium chloride 75 mL/hr at 07/01/20 2309      Antibiotics   Recent Abx Admin                   ceFAZolin (ANCEF) 2 g in dextrose 5 % 50 mL IVPB (g) 2 g New Bag 07/02/20 0935     2 g New Bag 07/01/20 2357     2 g New Bag  1620                 Intact: 4389ml  Urine Output: 3420ml    Neurologic Exam:    Mental status: Awake, alert, oriented x 4, speech clear  Cranial Nerves: PERRL EOMI FS TML visual fields intact on gross exam      Musculoskeletal:   Gait: Not tested   Tone: normal  Sensory: intact to light touch  Motor strength:    Right  Left    Right  Left    Deltoid  5 5  Hip Flex  5 5   Biceps  5 5  Knee Extensors  5 5   Triceps  5 5  Knee Flexors  5 5   Wrist Ext  5 5  Ankle Dorsiflex. 5 5   Wrist Flex  5 5  Ankle Plantarflex. 5 5   Handgrip  5 5  Ext Danny Longus  5 5   Thumb Ext  5 5         Respiratory:  Unlabored respiratory pattern - CTA B    Abdomen:   Soft, ND   Last BM - None since admission    Cardiovascular:  Warm, well perfused - +Murmur    Assessment   Patient is a 61 y/o F POD#1 Pituitary adenoma resection     Plan:  1. PIT TUMOR ORDERS:  1. Urine osmo, urine sodium, UA , serum osmo, ep1 Q6H - please attempt to draw at same time  2. Afrin x 3 days, nasal saline  3. Strict I&O  4. Nasal Precautions: No straws, No incentive spirometry, No nose blowing, cough & sneeze w/ mouth open   5. Endocrine consult post operatively / nephrology following for assistance w/ DI management   6. Follow up Mri today  7. Cortisol level in AM  2. Neurologic exam frequency: Q1H - can change to Q4H this evening if no changes occur  3. Mobility: Up as tolerates   4. Steroids: Continue Cortef 20mg BID  5. Diet: Advance as tolerates  6. Antibiotics: Ancef Post op x 3 doses  7. Doss: Keep in place today. If UOP remains stable can DC later today or early tomorrow morning. 8. DVT Prophylaxis: SCDs & SQH  9. GI Prophylaxis: pepcid  10. Bowel Regimen: Senna  11. Pain control: PRN tylenol for mild pain, prn oxycodone for more severe pain  12. Dispo Planning: Remain in ICU today for monitoring for signs of DI. If remains stable can consider transfer to floor status tomorrow.      Patient was discussed with Dr. Adrian Elaine who agrees with above assessment and plan. Electronically signed by: Elise Mireles, 7/2/2020 10:46 AM   Neurosurgery Nurse Practitioner  [phone]      __________________________________________________________________    I saw and examined Janelle Meyers on 07/02/20. I agree with the assessment and plan as detailed above.      Berta Jenkins MD, PhD  [address], [address], [address] [phone] (c), [phone] (o)

## 2020-07-02 NOTE — PROGRESS NOTES
Hospitalist Progress Note      PCP: IVIS Ahn - CNP    Date of Admission: 7/1/2020    Subjective: Seen and examined  Alert oriented denies   any new complaints  Blood pressure better  Wbcs elevated, not febrile, CTM    Medications:  Reviewed    Infusion Medications    sodium chloride 75 mL/hr at 07/01/20 2309     Scheduled Medications    famotidine  20 mg Oral BID    desmopressin  1 mcg Intravenous Once    sodium chloride flush  10 mL Intravenous 2 times per day    hydrocortisone  20 mg Oral BID    sennosides-docusate sodium  1 tablet Oral BID    oxymetazoline  2 spray Each Nostril Q6H    sodium chloride  1 spray Each Nostril Q4H While awake    heparin (porcine)  5,000 Units Subcutaneous Q8H    ceFAZolin (ANCEF) IVPB  2 g Intravenous Q8H     PRN Meds: sodium chloride flush, acetaminophen, oxyCODONE **OR** oxyCODONE, polyethylene glycol, bisacodyl, naloxone, hydrALAZINE, promethazine **OR** ondansetron      Intake/Output Summary (Last 24 hours) at 7/2/2020 1504  Last data filed at 7/2/2020 1400  Gross per 24 hour   Intake 2701 ml   Output 3275 ml   Net -574 ml       Physical Exam Performed:    BP (!) 146/92   Pulse 92   Temp 98.3 °F (36.8 °C) (Oral)   Resp 14   Ht 5' 7\" (1.702 m)   Wt 187 lb (84.8 kg)   SpO2 98%   BMI 29.29 kg/m²     General appearance: No apparent distress, appears stated age and cooperative. HEENT: Pupils equal, round, and reactive to light. Conjunctivae/corneas clear. Neck: Supple, with full range of motion. No jugular venous distention. Trachea midline. Respiratory:  Normal respiratory effort. Clear to auscultation, bilaterally without Rales/Wheezes/Rhonchi. Cardiovascular: Regular rate and rhythm with normal S1/S2 without murmurs, rubs or gallops. Abdomen: Soft, non-tender, non-distended with normal bowel sounds. Musculoskeletal: No clubbing, cyanosis or edema bilaterally. Full range of motion without deformity.   Skin: Skin color, texture, turgor normal.  No rashes or lesions. Neurologic:  Neurovascularly intact without any focal sensory/motor deficits. Cranial nerves: II-XII intact, grossly non-focal.  Psychiatric: Alert and oriented, thought content appropriate, normal insight  Capillary Refill: Brisk,< 3 seconds   Peripheral Pulses: +2 palpable, equal bilaterally       Labs:   Recent Labs     07/02/20  0457   WBC 16.8*   HGB 11.1*   HCT 34.4*        Recent Labs     07/01/20  2350 07/02/20  0457 07/02/20  1200    141 142   K  --  3.7 3.7   CL  --  111* 108   CO2  --  22 24   BUN  --  9 12   CREATININE  --  0.8 1.1   CALCIUM  --  8.2* 9.1     No results for input(s): AST, ALT, BILIDIR, BILITOT, ALKPHOS in the last 72 hours. No results for input(s): INR in the last 72 hours. No results for input(s): Kellee Jauns in the last 72 hours. Urinalysis:      Lab Results   Component Value Date    NITRU Negative 07/02/2020    WBCUA 0-2 07/02/2020    BACTERIA 2+ 02/03/2013    RBCUA 3-4 07/02/2020    BLOODU Negative 07/02/2020    SPECGRAV 1.020 07/02/2020    GLUCOSEU Negative 07/02/2020       Radiology:  MRI BRAIN W WO CONTRAST   Final Result      1. Status post debulking of pituitary macroadenoma, previously identified on the study from 2/7/2020 along the central and right portions of the pituitary including some extension of macroadenoma into the right cavernous sinus. Currently, the    central/medial portions of the tumor had been removed, with some residual tumor along the far right lateral sella turcica and right cavernous sinus regions as described above. Postoperative changes are noted within the ethmoid and sphenoid sinuses as    well as within the sella turcica itself. No mass effect on the optic chiasm or suprasellar cistern regions. Assessment/Plan:    Active Hospital Problems    Diagnosis    [facility] [D35.2]     1.  Hypertension uncontrolled:-Okay to resume home medication when she is taking p.o. diet and IV antihypertensives. Control pain  With iv pain meds  2. s/ post endoscopic removal of pituitary tumor:- continue replacement  3. Post op N/ V:- iv prn antiemetics  4.  Postoperative diabetes insipidus:-Consulted nephrology and endocrinology for further management    DVT Prophylaxis: lovenox  Diet: DIET GENERAL; No Drinking Straw  Code Status: Full Code    PT/OT Eval Status: ordered     Danae Pagan MD

## 2020-07-02 NOTE — PLAN OF CARE
Care plan reviewed. Problem: Falls - Risk of:  Goal: Will remain free from falls  Description: Will remain free from falls  7/2/2020 0440 by Michelle Samano RN  Outcome: Ongoing  7/1/2020 1832 by Ralph Kelley RN  Outcome: Ongoing  Note: Patient to unit from PACU. Resting in bed with side rails x 2. Bed in lowest position. Door to room open. Bed alert on, fall precautions in place. Daughter at bedside. Remains free from falls and injury throughout shift. Problem: Bleeding:  Goal: Will show no signs and symptoms of excessive bleeding  Description: Will show no signs and symptoms of excessive bleeding  7/2/2020 0440 by Michelle Samano RN  Outcome: Ongoing  7/1/2020 1832 by Ralph Kelley RN  Outcome: Ongoing  Note: Patient's hemoglobin this AM: No results for input(s): HGB in the last 72 hours. Patient's platelet count this AM: No results for input(s): PLT in the last 72 hours. Thrombocytopenia Precautions in place. Patient showing no signs or symptoms of active bleeding. Transfusion not indicated at this time. Patient verbalizes understanding of all instructions. Will continue to assess and implement POC. Call light within reach and hourly rounding in place. Slight nose bleed noted this shift. MD notified dressing in place, nose sprays administered as directed. No bleeding noted at this time. Problem: Skin Integrity:  Goal: Will show no infection signs and symptoms  Description: Will show no infection signs and symptoms  7/2/2020 0440 by Michelle Samano RN  Outcome: Ongoing  7/1/2020 1832 by Ralph Kelley RN  Outcome: Ongoing  Note: No skin breakdown noted this shift. Patient able to respostion self. Sacral heart intact. Will continue to monitor.    Goal: Absence of new skin breakdown  Description: Absence of new skin breakdown  Outcome: Ongoing

## 2020-07-02 NOTE — PROGRESS NOTES
Plans to keep galvan in place to monitor for signs of DI per Deborah Garcia NP with neurosurgery. Will re-assess as needed.

## 2020-07-02 NOTE — PROGRESS NOTES
Shift assessment completed. Pt AXOX4. No neuro deficits noted. Pt having some nausea - PRN zofran given. Afebrile. Hypertensive - prn hydralazine given per MAR. Pt denies pain and voices no needs at this time. Will continue to monitor closely and reassess.

## 2020-07-02 NOTE — CONSULTS
[facility]    [facility]              [phone]                       Plan :       Give 1 mcg of desmopressin X 1  Monitor UOP  Continue NS     Assessment :     Diabetes insipidus: Urine output at the time of evaluation is 3 L. She is status post pituitary adenoma removal.  Serum osmolality is 300. Urine osmolality was 568 yesterday and today is 95. Serum sodium is 140. Continue monitor urine osmolality, urine sodium and renal panel every 6 hourly. She is also on Cortef twice daily. I will accept a sodium between 140-150. So far urine output is not significant but I do expect that will  as urine osmolality is dropping. Continue maintenance normal saline. [facility] would like to thank Eugenio Thomas MD   for opportunity to serve this patient      Please call with questions at-   24 Hrs Answering service [phone] or  7 am- 5 pm via Perfect serve or cell phone  [employer] Delia          CC/reason for consult :     Diabetes insipidus post resection of pituitary adenoma     HPI :     Castillo Markham is a 60 y.o. female presented to   the hospital on 7/1/2020 with pituitary adenoma. She has history of pituitary adenoma, hypertension, kidney stone, GERD. She had transsphenoidal resection of the adenoma on 2/13/2013. Serial imaging demonstrates enlarging residual tumor and she was admitted on 7/1/2020 removal of   Pituitary Tumor. Post surgery her urine output is 3.4 L. Blood pressure is stable. Sodium has gone up from 136-141. I was called for further management of DI post surgery. Interval History:     Neurosurgery is following the case.     ROS:     Seen with-resident    positives in bold   Constitutional:  fever, chills, weakness, weight change, fatigue  Skin:  rash, pruritus, hair loss, bruising, dry skin, petechiae  Head, Face, Neck   headaches, swelling,  cervical adenopathy  Respiratory: shortness of breath, cough, or

## 2020-07-03 LAB
ANION GAP SERPL CALCULATED.3IONS-SCNC: 11 MMOL/L (ref 3–16)
ANION GAP SERPL CALCULATED.3IONS-SCNC: 12 MMOL/L (ref 3–16)
ANION GAP SERPL CALCULATED.3IONS-SCNC: 6 MMOL/L (ref 3–16)
ANION GAP SERPL CALCULATED.3IONS-SCNC: 8 MMOL/L (ref 3–16)
BACTERIA: ABNORMAL /HPF
BILIRUBIN URINE: NEGATIVE
BLOOD, URINE: NEGATIVE
BUN BLDV-MCNC: 11 MG/DL (ref 7–20)
BUN BLDV-MCNC: 8 MG/DL (ref 7–20)
BUN BLDV-MCNC: 9 MG/DL (ref 7–20)
BUN BLDV-MCNC: 9 MG/DL (ref 7–20)
CALCIUM SERPL-MCNC: 6.3 MG/DL (ref 8.3–10.6)
CALCIUM SERPL-MCNC: 9.1 MG/DL (ref 8.3–10.6)
CALCIUM SERPL-MCNC: 9.4 MG/DL (ref 8.3–10.6)
CALCIUM SERPL-MCNC: 9.9 MG/DL (ref 8.3–10.6)
CHLORIDE BLD-SCNC: 105 MMOL/L (ref 99–110)
CHLORIDE BLD-SCNC: 107 MMOL/L (ref 99–110)
CHLORIDE BLD-SCNC: 113 MMOL/L (ref 99–110)
CHLORIDE BLD-SCNC: 119 MMOL/L (ref 99–110)
CLARITY: CLEAR
CO2: 17 MMOL/L (ref 21–32)
CO2: 22 MMOL/L (ref 21–32)
CO2: 23 MMOL/L (ref 21–32)
CO2: 24 MMOL/L (ref 21–32)
COLOR: YELLOW
CORTISOL TOTAL: 6.3 UG/DL
CREAT SERPL-MCNC: 0.7 MG/DL (ref 0.6–1.2)
CREAT SERPL-MCNC: 0.7 MG/DL (ref 0.6–1.2)
CREAT SERPL-MCNC: 0.8 MG/DL (ref 0.6–1.2)
CREAT SERPL-MCNC: <0.5 MG/DL (ref 0.6–1.2)
EPITHELIAL CELLS, UA: ABNORMAL /HPF (ref 0–5)
GFR AFRICAN AMERICAN: >60
GFR NON-AFRICAN AMERICAN: >60
GLUCOSE BLD-MCNC: 102 MG/DL (ref 70–99)
GLUCOSE BLD-MCNC: 113 MG/DL (ref 70–99)
GLUCOSE BLD-MCNC: 130 MG/DL (ref 70–99)
GLUCOSE BLD-MCNC: 97 MG/DL (ref 70–99)
GLUCOSE URINE: NEGATIVE MG/DL
HCT VFR BLD CALC: 36.3 % (ref 36–48)
HEMOGLOBIN: 11.7 G/DL (ref 12–16)
KETONES, URINE: NEGATIVE MG/DL
LEUKOCYTE ESTERASE, URINE: ABNORMAL
LEUKOCYTE ESTERASE, URINE: NEGATIVE
MAGNESIUM: 1.7 MG/DL (ref 1.8–2.4)
MAGNESIUM: 2.5 MG/DL (ref 1.8–2.4)
MCH RBC QN AUTO: 30.3 PG (ref 26–34)
MCHC RBC AUTO-ENTMCNC: 32.2 G/DL (ref 31–36)
MCV RBC AUTO: 94 FL (ref 80–100)
MICROSCOPIC EXAMINATION: NORMAL
MICROSCOPIC EXAMINATION: YES
NITRITE, URINE: NEGATIVE
OSMOLALITY URINE: 301 MOSM/KG (ref 390–1070)
OSMOLALITY URINE: 342 MOSM/KG (ref 390–1070)
OSMOLALITY URINE: 354 MOSM/KG (ref 390–1070)
OSMOLALITY URINE: 491 MOSM/KG (ref 390–1070)
OSMOLALITY: 298 MOSM/KG (ref 278–305)
OSMOLALITY: 300 MOSM/KG (ref 278–305)
OSMOLALITY: 302 MOSM/KG (ref 278–305)
PDW BLD-RTO: 15.8 % (ref 12.4–15.4)
PH UA: 6.5 (ref 5–8)
PH UA: 7 (ref 5–8)
PLATELET # BLD: 335 K/UL (ref 135–450)
PMV BLD AUTO: 8.3 FL (ref 5–10.5)
POTASSIUM SERPL-SCNC: 2.5 MMOL/L (ref 3.5–5.1)
POTASSIUM SERPL-SCNC: 3.9 MMOL/L (ref 3.5–5.1)
POTASSIUM SERPL-SCNC: 4 MMOL/L (ref 3.5–5.1)
POTASSIUM SERPL-SCNC: 4.9 MMOL/L (ref 3.5–5.1)
PROTEIN UA: NEGATIVE MG/DL
RBC # BLD: 3.86 M/UL (ref 4–5.2)
RBC UA: ABNORMAL /HPF (ref 0–4)
SODIUM BLD-SCNC: 140 MMOL/L (ref 136–145)
SODIUM BLD-SCNC: 140 MMOL/L (ref 136–145)
SODIUM BLD-SCNC: 143 MMOL/L (ref 136–145)
SODIUM BLD-SCNC: 144 MMOL/L (ref 136–145)
SPECIFIC GRAVITY UA: 1.01 (ref 1–1.03)
URINE TYPE: ABNORMAL
URINE TYPE: NORMAL
UROBILINOGEN, URINE: 0.2 E.U./DL
WBC # BLD: 11.8 K/UL (ref 4–11)
WBC UA: ABNORMAL /HPF (ref 0–5)

## 2020-07-03 PROCEDURE — 6370000000 HC RX 637 (ALT 250 FOR IP): Performed by: NEUROLOGICAL SURGERY

## 2020-07-03 PROCEDURE — 6370000000 HC RX 637 (ALT 250 FOR IP): Performed by: STUDENT IN AN ORGANIZED HEALTH CARE EDUCATION/TRAINING PROGRAM

## 2020-07-03 PROCEDURE — 6360000002 HC RX W HCPCS: Performed by: NEUROLOGICAL SURGERY

## 2020-07-03 PROCEDURE — 83735 ASSAY OF MAGNESIUM: CPT

## 2020-07-03 PROCEDURE — 99024 POSTOP FOLLOW-UP VISIT: CPT | Performed by: OTOLARYNGOLOGY

## 2020-07-03 PROCEDURE — 36415 COLL VENOUS BLD VENIPUNCTURE: CPT

## 2020-07-03 PROCEDURE — 85027 COMPLETE CBC AUTOMATED: CPT

## 2020-07-03 PROCEDURE — 6360000002 HC RX W HCPCS: Performed by: STUDENT IN AN ORGANIZED HEALTH CARE EDUCATION/TRAINING PROGRAM

## 2020-07-03 PROCEDURE — 2580000003 HC RX 258: Performed by: NEUROLOGICAL SURGERY

## 2020-07-03 PROCEDURE — 83935 ASSAY OF URINE OSMOLALITY: CPT

## 2020-07-03 PROCEDURE — 80048 BASIC METABOLIC PNL TOTAL CA: CPT

## 2020-07-03 PROCEDURE — 83930 ASSAY OF BLOOD OSMOLALITY: CPT

## 2020-07-03 PROCEDURE — 81001 URINALYSIS AUTO W/SCOPE: CPT

## 2020-07-03 PROCEDURE — 81003 URINALYSIS AUTO W/O SCOPE: CPT

## 2020-07-03 PROCEDURE — 82533 TOTAL CORTISOL: CPT

## 2020-07-03 PROCEDURE — 6370000000 HC RX 637 (ALT 250 FOR IP): Performed by: NURSE PRACTITIONER

## 2020-07-03 PROCEDURE — 2000000000 HC ICU R&B

## 2020-07-03 RX ORDER — POTASSIUM CHLORIDE 20 MEQ/1
40 TABLET, EXTENDED RELEASE ORAL ONCE
Status: COMPLETED | OUTPATIENT
Start: 2020-07-03 | End: 2020-07-03

## 2020-07-03 RX ORDER — HYDROCORTISONE 5 MG/1
5 TABLET ORAL EVERY EVENING
Status: DISCONTINUED | OUTPATIENT
Start: 2020-07-04 | End: 2020-07-04 | Stop reason: HOSPADM

## 2020-07-03 RX ORDER — MAGNESIUM SULFATE IN WATER 40 MG/ML
2 INJECTION, SOLUTION INTRAVENOUS ONCE
Status: DISCONTINUED | OUTPATIENT
Start: 2020-07-03 | End: 2020-07-03

## 2020-07-03 RX ORDER — HYDROCORTISONE 5 MG/1
10 TABLET ORAL 2 TIMES DAILY
Status: DISCONTINUED | OUTPATIENT
Start: 2020-07-03 | End: 2020-07-04 | Stop reason: HOSPADM

## 2020-07-03 RX ORDER — POTASSIUM CHLORIDE 7.45 MG/ML
10 INJECTION INTRAVENOUS
Status: DISPENSED | OUTPATIENT
Start: 2020-07-03 | End: 2020-07-03

## 2020-07-03 RX ADMIN — SALINE NASAL SPRAY 1 SPRAY: 1.5 SOLUTION NASAL at 21:48

## 2020-07-03 RX ADMIN — DOCUSATE SODIUM 50 MG AND SENNOSIDES 8.6 MG 1 TABLET: 8.6; 5 TABLET, FILM COATED ORAL at 20:29

## 2020-07-03 RX ADMIN — HEPARIN SODIUM 5000 UNITS: 5000 INJECTION INTRAVENOUS; SUBCUTANEOUS at 20:29

## 2020-07-03 RX ADMIN — Medication 2 SPRAY: at 15:30

## 2020-07-03 RX ADMIN — NIFEDIPINE 30 MG: 30 TABLET, EXTENDED RELEASE ORAL at 20:29

## 2020-07-03 RX ADMIN — SALINE NASAL SPRAY 1 SPRAY: 1.5 SOLUTION NASAL at 06:15

## 2020-07-03 RX ADMIN — SODIUM CHLORIDE: 9 INJECTION, SOLUTION INTRAVENOUS at 06:22

## 2020-07-03 RX ADMIN — POTASSIUM CHLORIDE 10 MEQ: 10 INJECTION, SOLUTION INTRAVENOUS at 01:11

## 2020-07-03 RX ADMIN — HEPARIN SODIUM 5000 UNITS: 5000 INJECTION INTRAVENOUS; SUBCUTANEOUS at 05:27

## 2020-07-03 RX ADMIN — Medication 2 SPRAY: at 03:23

## 2020-07-03 RX ADMIN — Medication 2 SPRAY: at 09:00

## 2020-07-03 RX ADMIN — DOCUSATE SODIUM 50 MG AND SENNOSIDES 8.6 MG 1 TABLET: 8.6; 5 TABLET, FILM COATED ORAL at 08:57

## 2020-07-03 RX ADMIN — Medication 2 SPRAY: at 21:00

## 2020-07-03 RX ADMIN — HEPARIN SODIUM 5000 UNITS: 5000 INJECTION INTRAVENOUS; SUBCUTANEOUS at 13:45

## 2020-07-03 RX ADMIN — CEFAZOLIN 2 G: 10 INJECTION, POWDER, FOR SOLUTION INTRAVENOUS at 00:24

## 2020-07-03 RX ADMIN — POTASSIUM CHLORIDE 10 MEQ: 10 INJECTION, SOLUTION INTRAVENOUS at 02:03

## 2020-07-03 RX ADMIN — SALINE NASAL SPRAY 1 SPRAY: 1.5 SOLUTION NASAL at 14:00

## 2020-07-03 RX ADMIN — CEFAZOLIN 2 G: 10 INJECTION, POWDER, FOR SOLUTION INTRAVENOUS at 16:08

## 2020-07-03 RX ADMIN — HYDROCORTISONE 10 MG: 5 TABLET ORAL at 20:29

## 2020-07-03 RX ADMIN — HYDROCORTISONE 20 MG: 20 TABLET ORAL at 09:00

## 2020-07-03 RX ADMIN — FAMOTIDINE 20 MG: 20 TABLET ORAL at 08:57

## 2020-07-03 RX ADMIN — POTASSIUM CHLORIDE 10 MEQ: 10 INJECTION, SOLUTION INTRAVENOUS at 05:27

## 2020-07-03 RX ADMIN — SALINE NASAL SPRAY 1 SPRAY: 1.5 SOLUTION NASAL at 10:00

## 2020-07-03 RX ADMIN — POTASSIUM CHLORIDE 10 MEQ: 10 INJECTION, SOLUTION INTRAVENOUS at 03:56

## 2020-07-03 RX ADMIN — NIFEDIPINE 30 MG: 30 TABLET, EXTENDED RELEASE ORAL at 08:57

## 2020-07-03 RX ADMIN — POTASSIUM CHLORIDE 10 MEQ: 10 INJECTION, SOLUTION INTRAVENOUS at 02:57

## 2020-07-03 RX ADMIN — SALINE NASAL SPRAY 1 SPRAY: 1.5 SOLUTION NASAL at 18:00

## 2020-07-03 RX ADMIN — POTASSIUM CHLORIDE 40 MEQ: 20 TABLET, EXTENDED RELEASE ORAL at 01:09

## 2020-07-03 RX ADMIN — CEFAZOLIN 2 G: 10 INJECTION, POWDER, FOR SOLUTION INTRAVENOUS at 08:43

## 2020-07-03 RX ADMIN — ACETAMINOPHEN 650 MG: 325 TABLET ORAL at 09:24

## 2020-07-03 RX ADMIN — FAMOTIDINE 20 MG: 20 TABLET ORAL at 20:29

## 2020-07-03 ASSESSMENT — PAIN SCALES - GENERAL
PAINLEVEL_OUTOF10: 4
PAINLEVEL_OUTOF10: 0

## 2020-07-03 ASSESSMENT — PAIN DESCRIPTION - ONSET: ONSET: ON-GOING

## 2020-07-03 ASSESSMENT — PAIN DESCRIPTION - LOCATION: LOCATION: HEAD

## 2020-07-03 ASSESSMENT — PAIN DESCRIPTION - PROGRESSION: CLINICAL_PROGRESSION: NOT CHANGED

## 2020-07-03 ASSESSMENT — PAIN DESCRIPTION - PAIN TYPE: TYPE: SURGICAL PAIN

## 2020-07-03 ASSESSMENT — PAIN DESCRIPTION - FREQUENCY: FREQUENCY: INTERMITTENT

## 2020-07-03 ASSESSMENT — PAIN DESCRIPTION - DESCRIPTORS: DESCRIPTORS: HEADACHE

## 2020-07-03 ASSESSMENT — PAIN - FUNCTIONAL ASSESSMENT: PAIN_FUNCTIONAL_ASSESSMENT: ACTIVITIES ARE NOT PREVENTED

## 2020-07-03 NOTE — PROGRESS NOTES
vomiting, diarrhea, constipation,belly pain    Yellow skin, blood in stool  Musculoskeletal:  back pain, muscle weakness, gait problems,       joint pain or swelling. Genitourinary:  dysuria, poor urine flow, flank pain, blood in urine  Neurologic:  vertigo, TIA'S, syncope, seizures, focal weakness  Psychosocial:  insomnia, anxiety, or depression. All other remaining systems are negative or unable to obtain        PMH/PSH/SH/Family History:     Past Medical History:   Diagnosis Date    Allergic rhinitis     Chicken pox     GERD (gastroesophageal reflux disease)     Hypertension     Kidney stone     [facility]     Pituitary microadenoma ([facility]        Past Surgical History:   Procedure Laterality Date    CRANIOTOMY N/A 7/1/2020    REDO ENDOSCOPIC ENDONASAL TRANSPHENOIDAL RESECTION OF PITUITARY TUMOR performed by Seth Liz MD at [address] [place] [address] Left 9/24/13    EXCSION OF LEFT HAND DORSAL GANGLION CYST    HYSTERECTOMY      NASAL SINUS SURGERY N/A 7/1/2020    NEUROENDOSCOPY WITH REMOVAL OF PITUITARY TUMOR TRANSPHENOIDAL, STEREOTACTIC COMPUTER ASSISTED SURGERY performed by Jerrell Urena MD at [place] [address]    CARRILLO AND BSO          reports that she has never smoked. She has never used smokeless tobacco. She reports current alcohol use of about 1.0 standard drinks of alcohol per week. She reports that she does not use drugs. family history includes Cancer in her brother and paternal grandmother; High Blood Pressure in her mother.          Medication:     Current Facility-Administered Medications: hydrocortisone (CORTEF) tablet 10 mg, 10 mg, Oral, BID  [START ON 7/4/2020] hydrocortisone (CORTEF) tablet 5 mg, 5 mg, Oral, QPM  famotidine (PEPCID) tablet 20 mg, 20 mg, Oral, BID  desmopressin (DDAVP) injection 1 mcg, 1 mcg, Intravenous, Once  NIFEdipine (ADALAT CC) extended release tablet 30 mg, 30 mg, Oral, BID  sodium chloride flush 0.9 % no  Musculoskeletal:  clubbing no,cyanosis- no , digital ischemia- no                           muscle strength- grossly normal , tone - grossly normal  Skin: rashes- no , ulcers- no, induration- no, tightening - no  Psychiatric: Not evaluated     LABS:     Recent Labs     07/02/20  0457 07/03/20  0506   WBC 16.8* 11.8*   HGB 11.1* 11.7*   HCT 34.4* 36.3    335     Recent Labs     07/02/20  2345 07/03/20  0506 07/03/20  1205    143 140   K 2.5* 4.9 4.0   * 113* 107   CO2 17* 24 22   BUN 8 9 9   CREATININE <0.5* 0.7 0.7   GLUCOSE 102* 113* 130*   MG 1.70* 2.50*  --         Nephrology  [address], [address]  Office: [phone]  Cell: [phone]  Fax: [phone]

## 2020-07-03 NOTE — PROGRESS NOTES
Neurosurgery Progress Note    Patient seen and examined on 07/03/20. No acute events overnight. Neurosurgery Progress Note     Patient seen and examined on 07/03/20. No acute events overnight. I/O: 2.8/4.7 L. DI treated with transnasal DDAVP.  Neurologically intact on exam.     A/P: 42 yo woman POD 2 s/p TSRPT     -Neuro improved  -HOB elevated  -Frequent neuro checks - ok for q4 hrs  -Afrin x 3 days, Nasal saline  -Monitor I/O's  -Nephrology following  -Wean Hydrocortisone  -Transsphenoidal precautions  -Continue Ancef post-op  -Dispo: ICU     Akosua Pavon MD, PhD  [facility]  [address] [facility], [address] [place] [address], [facility], [address] [phone] (c), [phone] (o)

## 2020-07-03 NOTE — PROGRESS NOTES
Shift assessment completed. No neuro changes noted. VSS. PRN tylenol given for pain. Dr. Tru Rice at bedside. Per Dr. Tru Rice, will notify for consistent significant increases in urine output. Will monitor closely.

## 2020-07-03 NOTE — PROGRESS NOTES
07/03/2020     Lab Results   Component Value Date    PHOS 3.2 06/07/2018     Lab Results   Component Value Date    ALKPHOS 139 (H) 01/27/2017    ALT 16 01/27/2017    AST 18 01/27/2017    BILITOT 0.4 01/27/2017    PROT 7.0 01/27/2017     No results found for: LDH  No results found for: PTT  No results found for: AMYLASE  Lab Results   Component Value Date    LIPASE 16.3 01/30/2014

## 2020-07-03 NOTE — PROGRESS NOTES
deformity. Skin: Skin color, texture, turgor normal.  No rashes or lesions. Neurologic:  Neurovascularly intact without any focal sensory/motor deficits. Cranial nerves: II-XII intact, grossly non-focal.        Labs:   Recent Labs     07/02/20  0457 07/03/20  0506   WBC 16.8* 11.8*   HGB 11.1* 11.7*   HCT 34.4* 36.3    335     Recent Labs     07/02/20  1748 07/02/20  2345 07/03/20  0506    144 143   K 3.5 2.5* 4.9    119* 113*   CO2 23 17* 24   BUN 13 8 9   CREATININE 1.0 <0.5* 0.7   CALCIUM 9.2 6.3* 9.1     No results for input(s): AST, ALT, BILIDIR, BILITOT, ALKPHOS in the last 72 hours. No results for input(s): INR in the last 72 hours. No results for input(s): [place] in the last 72 hours. Urinalysis:      Lab Results   Component Value Date    NITRU Negative 07/03/2020    WBCUA 0-2 07/02/2020    BACTERIA 2+ 02/03/2013    RBCUA 3-4 07/02/2020    BLOODU Negative 07/03/2020    SPECGRAV 1.010 07/03/2020    GLUCOSEU Negative 07/03/2020       Radiology:  MRI BRAIN W WO CONTRAST   Final Result      1. Status post debulking of pituitary macroadenoma, previously identified on the study from 2/7/2020 along the central and right portions of the pituitary including some extension of macroadenoma into the right cavernous sinus. Currently, the    central/medial portions of the tumor had been removed, with some residual tumor along the far right lateral sella turcica and right cavernous sinus regions as described above. Postoperative changes are noted within the ethmoid and sphenoid sinuses as    well as within the sella turcica itself. No mass effect on the optic chiasm or suprasellar cistern regions.                  Assessment/Plan:    60-year-old lady status post transsphenoidal pituitary resection on July 1    Status post transsphenoidal resection of the pituitary  ENT and neurosurgery following    Hypertension  Off blood pressure medication  Blood pressure well controlled    Postoperative diabetes insipidus  Status post desmopressin  Continue to monitor urine output  Nephrology consulted    DVT Prophylaxis: lovenox  Diet: DIET GENERAL; No Drinking Straw  Code Status: Full Code    PT/OT Eval Status: ordered     Junior Peres MD

## 2020-07-03 NOTE — PLAN OF CARE
Care plan reviewed.     Problem: Falls - Risk of:  Goal: Will remain free from falls  Description: Will remain free from falls  Outcome: Ongoing     Problem: Bleeding:  Goal: Will show no signs and symptoms of excessive bleeding  Description: Will show no signs and symptoms of excessive bleeding  Outcome: Ongoing     Problem: Skin Integrity:  Goal: Will show no infection signs and symptoms  Description: Will show no infection signs and symptoms  Outcome: Ongoing

## 2020-07-04 VITALS
DIASTOLIC BLOOD PRESSURE: 77 MMHG | OXYGEN SATURATION: 100 % | HEIGHT: 67 IN | BODY MASS INDEX: 29.35 KG/M2 | SYSTOLIC BLOOD PRESSURE: 142 MMHG | HEART RATE: 100 BPM | WEIGHT: 187 LBS | TEMPERATURE: 98.7 F | RESPIRATION RATE: 24 BRPM

## 2020-07-04 LAB
ANION GAP SERPL CALCULATED.3IONS-SCNC: 10 MMOL/L (ref 3–16)
ANION GAP SERPL CALCULATED.3IONS-SCNC: 11 MMOL/L (ref 3–16)
ANION GAP SERPL CALCULATED.3IONS-SCNC: 8 MMOL/L (ref 3–16)
BILIRUBIN URINE: NEGATIVE
BLOOD, URINE: NEGATIVE
BUN BLDV-MCNC: 7 MG/DL (ref 7–20)
BUN BLDV-MCNC: 8 MG/DL (ref 7–20)
BUN BLDV-MCNC: 9 MG/DL (ref 7–20)
CALCIUM SERPL-MCNC: 8.9 MG/DL (ref 8.3–10.6)
CALCIUM SERPL-MCNC: 9.3 MG/DL (ref 8.3–10.6)
CALCIUM SERPL-MCNC: 9.5 MG/DL (ref 8.3–10.6)
CHLORIDE BLD-SCNC: 106 MMOL/L (ref 99–110)
CHLORIDE BLD-SCNC: 109 MMOL/L (ref 99–110)
CHLORIDE BLD-SCNC: 110 MMOL/L (ref 99–110)
CLARITY: CLEAR
CO2: 22 MMOL/L (ref 21–32)
CO2: 23 MMOL/L (ref 21–32)
CO2: 23 MMOL/L (ref 21–32)
COLOR: YELLOW
CORTISOL TOTAL: 13.4 UG/DL
CREAT SERPL-MCNC: 0.6 MG/DL (ref 0.6–1.2)
CREAT SERPL-MCNC: 0.6 MG/DL (ref 0.6–1.2)
CREAT SERPL-MCNC: 0.7 MG/DL (ref 0.6–1.2)
GFR AFRICAN AMERICAN: >60
GFR NON-AFRICAN AMERICAN: >60
GLUCOSE BLD-MCNC: 111 MG/DL (ref 70–99)
GLUCOSE BLD-MCNC: 134 MG/DL (ref 70–99)
GLUCOSE BLD-MCNC: 99 MG/DL (ref 70–99)
GLUCOSE URINE: NEGATIVE MG/DL
KETONES, URINE: NEGATIVE MG/DL
LEUKOCYTE ESTERASE, URINE: NEGATIVE
MICROSCOPIC EXAMINATION: NORMAL
NITRITE, URINE: NEGATIVE
OSMOLALITY URINE: 202 MOSM/KG (ref 390–1070)
OSMOLALITY URINE: 230 MOSM/KG (ref 390–1070)
OSMOLALITY URINE: 256 MOSM/KG (ref 390–1070)
OSMOLALITY: 298 MOSM/KG (ref 278–305)
PH UA: 6.5 (ref 5–8)
PH UA: 7 (ref 5–8)
PH UA: 7 (ref 5–8)
POTASSIUM SERPL-SCNC: 3.9 MMOL/L (ref 3.5–5.1)
POTASSIUM SERPL-SCNC: 4 MMOL/L (ref 3.5–5.1)
POTASSIUM SERPL-SCNC: 4.1 MMOL/L (ref 3.5–5.1)
PROTEIN UA: NEGATIVE MG/DL
SODIUM BLD-SCNC: 139 MMOL/L (ref 136–145)
SODIUM BLD-SCNC: 140 MMOL/L (ref 136–145)
SODIUM BLD-SCNC: 143 MMOL/L (ref 136–145)
SPECIFIC GRAVITY UA: 1.01 (ref 1–1.03)
URINE TYPE: NORMAL
UROBILINOGEN, URINE: 0.2 E.U./DL

## 2020-07-04 PROCEDURE — 6370000000 HC RX 637 (ALT 250 FOR IP): Performed by: STUDENT IN AN ORGANIZED HEALTH CARE EDUCATION/TRAINING PROGRAM

## 2020-07-04 PROCEDURE — 83930 ASSAY OF BLOOD OSMOLALITY: CPT

## 2020-07-04 PROCEDURE — 80048 BASIC METABOLIC PNL TOTAL CA: CPT

## 2020-07-04 PROCEDURE — 6370000000 HC RX 637 (ALT 250 FOR IP): Performed by: NEUROLOGICAL SURGERY

## 2020-07-04 PROCEDURE — 82533 TOTAL CORTISOL: CPT

## 2020-07-04 PROCEDURE — 6360000002 HC RX W HCPCS: Performed by: NEUROLOGICAL SURGERY

## 2020-07-04 PROCEDURE — 2580000003 HC RX 258: Performed by: NEUROLOGICAL SURGERY

## 2020-07-04 PROCEDURE — 36415 COLL VENOUS BLD VENIPUNCTURE: CPT

## 2020-07-04 PROCEDURE — 83935 ASSAY OF URINE OSMOLALITY: CPT

## 2020-07-04 PROCEDURE — 81003 URINALYSIS AUTO W/O SCOPE: CPT

## 2020-07-04 PROCEDURE — 6370000000 HC RX 637 (ALT 250 FOR IP): Performed by: NURSE PRACTITIONER

## 2020-07-04 RX ORDER — SENNA AND DOCUSATE SODIUM 50; 8.6 MG/1; MG/1
1 TABLET, FILM COATED ORAL 2 TIMES DAILY
COMMUNITY
Start: 2020-07-04 | End: 2020-09-10

## 2020-07-04 RX ORDER — OXYCODONE HYDROCHLORIDE 5 MG/1
5 TABLET ORAL EVERY 4 HOURS PRN
Qty: 30 TABLET | Refills: 0 | Status: SHIPPED | OUTPATIENT
Start: 2020-07-04 | End: 2020-07-07

## 2020-07-04 RX ORDER — HYDROCORTISONE 5 MG/1
5 TABLET ORAL EVERY EVENING
Qty: 14 TABLET | Refills: 1 | Status: SHIPPED | OUTPATIENT
Start: 2020-07-04 | End: 2020-09-10

## 2020-07-04 RX ORDER — HYDROCORTISONE 10 MG/1
10 TABLET ORAL
Qty: 14 TABLET | Refills: 1 | Status: SHIPPED | OUTPATIENT
Start: 2020-07-04 | End: 2020-09-10

## 2020-07-04 RX ADMIN — NIFEDIPINE 30 MG: 30 TABLET, EXTENDED RELEASE ORAL at 08:46

## 2020-07-04 RX ADMIN — SALINE NASAL SPRAY 1 SPRAY: 1.5 SOLUTION NASAL at 06:10

## 2020-07-04 RX ADMIN — CEFAZOLIN 2 G: 10 INJECTION, POWDER, FOR SOLUTION INTRAVENOUS at 00:12

## 2020-07-04 RX ADMIN — Medication 2 SPRAY: at 03:13

## 2020-07-04 RX ADMIN — Medication 2 SPRAY: at 08:49

## 2020-07-04 RX ADMIN — HEPARIN SODIUM 5000 UNITS: 5000 INJECTION INTRAVENOUS; SUBCUTANEOUS at 06:08

## 2020-07-04 RX ADMIN — DOCUSATE SODIUM 50 MG AND SENNOSIDES 8.6 MG 1 TABLET: 8.6; 5 TABLET, FILM COATED ORAL at 08:46

## 2020-07-04 RX ADMIN — HYDROCORTISONE 10 MG: 5 TABLET ORAL at 08:46

## 2020-07-04 RX ADMIN — FAMOTIDINE 20 MG: 20 TABLET ORAL at 08:46

## 2020-07-04 RX ADMIN — CEFAZOLIN 2 G: 10 INJECTION, POWDER, FOR SOLUTION INTRAVENOUS at 08:02

## 2020-07-04 ASSESSMENT — PAIN SCALES - GENERAL
PAINLEVEL_OUTOF10: 0

## 2020-07-04 NOTE — PROGRESS NOTES
Pt in bed, denies any complaints including headache and nausea. Pt is alert and oriented x 4, call light in reach. Bed in lowest locked position, side rails up x 3, bed alarm on.

## 2020-07-04 NOTE — PROGRESS NOTES
[facility]    [facility]              [phone]                       Plan :     Sodium stable  Overall doing better  BP stable   UOP acceptable   Going home today no intervention needed  Plan for OUTPT office followup         Assessment :     Diabetes insipidus: Urine output at the time of evaluation is 3 L. She is status post pituitary adenoma removal.  Serum osmolality is 300. Urine osmolality was 568 yesterday and today is 95. Serum sodium is 140. Continue monitor urine osmolality, urine sodium and renal panel every 6 hourly. She is also on Cortef twice daily. I will accept a sodium between 140-150. [facility] would like to thank Berta Jenkins MD   for opportunity to serve this patient      Please call with questions at-   24 Hrs Answering service [phone] or  7 am- 5 pm via Perfect serve or cell phone  Wellington Bartholomew          CC/reason for consult :     Diabetes insipidus post resection of pituitary adenoma     HPI :     Janelle Meyers is a 60 y.o. female presented to   the hospital on 7/1/2020 with pituitary adenoma. She has history of pituitary adenoma, hypertension, kidney stone, GERD. She had transsphenoidal resection of the adenoma on 2/13/2013. Serial imaging demonstrates enlarging residual tumor and she was admitted on 7/1/2020 removal of   Pituitary Tumor. Post surgery her urine output is 3.4 L. Blood pressure is stable. Sodium has gone up from 136-141. I was called for further management of DI post surgery.      Interval History:     Feels ok   No new complaints     ROS:     Seen with none    positives in bold   Constitutional:  fever, chills, weakness, weight change, fatigue  Skin:  rash, pruritus, hair loss, bruising, dry skin, petechiae  Head, Face, Neck   headaches, swelling,  cervical adenopathy  Respiratory: shortness of breath, cough, or wheezing  Cardiovascular: chest pain, palpitations, dizzy, edema  Gastrointestinal: nausea, vomiting, diarrhea, constipation,belly pain    Yellow skin, blood in stool  Musculoskeletal:  back pain, muscle weakness, gait problems,       joint pain or swelling. Genitourinary:  dysuria, poor urine flow, flank pain, blood in urine  Neurologic:  vertigo, TIA'S, syncope, seizures, focal weakness  Psychosocial:  insomnia, anxiety, or depression. All other remaining systems are negative or unable to obtain        PMH/PSH/SH/Family History:     Past Medical History:   Diagnosis Date    Allergic rhinitis     Chicken pox     GERD (gastroesophageal reflux disease)     Hypertension     Kidney stone     [facility]     Pituitary microadenoma ([facility]        Past Surgical History:   Procedure Laterality Date    CRANIOTOMY N/A 7/1/2020    REDO ENDOSCOPIC ENDONASAL TRANSPHENOIDAL RESECTION OF PITUITARY TUMOR performed by Yesenia Overton MD at [address] [place] [address] Left 9/24/13    EXCSION OF LEFT HAND DORSAL GANGLION CYST    HYSTERECTOMY      NASAL SINUS SURGERY N/A 7/1/2020    NEUROENDOSCOPY WITH REMOVAL OF PITUITARY TUMOR TRANSPHENOIDAL, STEREOTACTIC COMPUTER ASSISTED SURGERY performed by Pete Graves MD at [place] [address]    CARRILLO AND BSO          reports that she has never smoked. She has never used smokeless tobacco. She reports current alcohol use of about 1.0 standard drinks of alcohol per week. She reports that she does not use drugs. family history includes Cancer in her brother and paternal grandmother; High Blood Pressure in her mother.          Medication:     Current Facility-Administered Medications: hydrocortisone (CORTEF) tablet 10 mg, 10 mg, Oral, BID  hydrocortisone (CORTEF) tablet 5 mg, 5 mg, Oral, QPM  famotidine (PEPCID) tablet 20 mg, 20 mg, Oral, BID  desmopressin (DDAVP) injection 1 mcg, 1 mcg, Intravenous, Once  NIFEdipine (ADALAT CC) extended release tablet 30 mg, 30 mg, Oral, BID  sodium chloride flush 0.9 % injection 10 mL, 10 mL, Intravenous, 2 times per day  sodium chloride flush 0.9 % injection 10 mL, 10 mL, Intravenous, PRN  0.9 % sodium chloride infusion, , Intravenous, Continuous  acetaminophen (TYLENOL) tablet 650 mg, 650 mg, Oral, Q4H PRN  oxyCODONE (ROXICODONE) immediate release tablet 5 mg, 5 mg, Oral, Q4H PRN **OR** oxyCODONE (ROXICODONE) immediate release tablet 10 mg, 10 mg, Oral, Q4H PRN  sennosides-docusate sodium (SENOKOT-S) 8.6-50 MG tablet 1 tablet, 1 tablet, Oral, BID  polyethylene glycol (GLYCOLAX) packet 17 g, 17 g, Oral, Daily PRN  bisacodyl (DULCOLAX) suppository 10 mg, 10 mg, Rectal, Daily PRN  naloxone (NARCAN) injection 0.2 mg, 0.2 mg, Intravenous, PRN  oxymetazoline (AFRIN) 0.05 % nasal spray 2 spray, 2 spray, Each Nostril, Q6H  sodium chloride (OCEAN, BABY AYR) 0.65 % nasal spray 1 spray, 1 spray, Each Nostril, Q4H While awake  hydrALAZINE (APRESOLINE) injection 10 mg, 10 mg, Intravenous, Q8H PRN  heparin (porcine) injection 5,000 Units, 5,000 Units, Subcutaneous, Q8H  ceFAZolin (ANCEF) 2 g in dextrose 5 % 50 mL IVPB, 2 g, Intravenous, Q8H  promethazine (PHENERGAN) tablet 12.5 mg, 12.5 mg, Oral, Q3H PRN **OR** ondansetron (ZOFRAN) injection 4 mg, 4 mg, Intravenous, Q3H PRN       Vitals :     Vitals:    07/04/20 1300   BP: (!) 140/83   Pulse: 91   Resp: 19   Temp:    SpO2:        I & O :       Intake/Output Summary (Last 24 hours) at 7/4/2020 1439  Last data filed at 7/4/2020 1200  Gross per 24 hour   Intake 3379 ml   Output 2765 ml   Net 614 ml        Physical Examination :     General appearance: Anxious- no, distressed- no, in good spirits- yes    HEENT: Lips- normal, teeth- ok , oral mucosa- moist  Neck : Mass- no, appears symmetrical, JVD- not visible  Respiratory: Respiratory effort-okay, wheeze- no, crackles -none  Cardiovascular:  Ausculation- No M/R/G, Edema none  Abdomen: visible mass- no, distention- no, scar- no, tenderness- no                            hepatosplenomegaly- no  Musculoskeletal:  clubbing no,cyanosis- no , digital ischemia- no                           muscle strength- grossly normal , tone - grossly normal  Skin: rashes- no , ulcers- no, induration- no, tightening - no  Psychiatric: Not evaluated     LABS:     Recent Labs     07/02/20  0457 07/03/20  0506   WBC 16.8* 11.8*   HGB 11.1* 11.7*   HCT 34.4* 36.3    335     Recent Labs     07/02/20  2345 07/03/20  0506  07/04/20  0011 07/04/20  0554 07/04/20  1222    143   < > 139 140 143   K 2.5* 4.9   < > 4.1 4.0 3.9   * 113*   < > 106 110 109   CO2 17* 24   < > 23 22 23   BUN 8 9   < > 9 7 8   CREATININE <0.5* 0.7   < > 0.6 0.6 0.7   GLUCOSE 102* 113*   < > 134* 99 111*   MG 1.70* 2.50*  --   --   --   --     < > = values in this interval not displayed.         Nephrology  [facility] [address]  Office: [phone]  Cell: [phone]  Fax: [phone]

## 2020-07-04 NOTE — PLAN OF CARE
Problem: Falls - Risk of:  Goal: Will remain free from falls  Description: Will remain free from falls  Outcome: Ongoing  Pt is fall risk. Instructed to call for assistance with ambulation, call light in reach. Pt verbalized understanding. No attempts to exit bed without assistance overnight. Bed in lowest locked position, side rails up x 3, bed alarm on. Problem: Bleeding:  Goal: Will show no signs and symptoms of excessive bleeding  Description: Will show no signs and symptoms of excessive bleeding  Outcome: Ongoing  No bleeding at this time, will cont to monitor. Problem: Skin Integrity:  Goal: Will show no infection signs and symptoms  Description: Will show no infection signs and symptoms  Outcome: Ongoing  Pt turns and repositions self frequently. Skin care given as needed. Doss care completed with CHG per protocol. Sacral heart to coccyx for prevention. Will cont to monitor skin for needs.

## 2020-07-04 NOTE — PLAN OF CARE
Problem: Falls - Risk of:  Goal: Will remain free from falls  Description: Will remain free from falls  7/4/2020 1358 by Raisa Vizcarra RN  Outcome: Ongoing       Problem: Falls - Risk of:  Goal: Absence of physical injury  Description: Absence of physical injury  7/4/2020 1358 by Raisa Vizcarra RN  Outcome: Ongoing    Problem: Bleeding:  Goal: Will show no signs and symptoms of excessive bleeding  Description: Will show no signs and symptoms of excessive bleeding  7/4/2020 1358 by Raisa Vizcarra RN  Outcome: Ongoing       Problem: Skin Integrity:  Goal: Will show no infection signs and symptoms  Description: Will show no infection signs and symptoms  7/4/2020 1358 by Raisa Vizcarra RN  Outcome: Ongoing       Problem: Skin Integrity:  Goal: Absence of new skin breakdown  Description: Absence of new skin breakdown  7/4/2020 1358 by Raisa Vizcarra RN  Outcome: Ongoing

## 2020-07-04 NOTE — PROGRESS NOTES
Hospitalist Progress Note      PCP: Jake Grace, APRN - CNP    Date of Admission: 7/1/2020    Subjective: Seen and examined  Feels well  No chest pain shortness of breath  Urine output 2.5 L in the last 24 hours     Medications:  Reviewed    Infusion Medications    sodium chloride 75 mL/hr at 07/03/20 0622     Scheduled Medications    hydrocortisone  10 mg Oral BID    hydrocortisone  5 mg Oral QPM    famotidine  20 mg Oral BID    desmopressin  1 mcg Intravenous Once    NIFEdipine  30 mg Oral BID    sodium chloride flush  10 mL Intravenous 2 times per day    sennosides-docusate sodium  1 tablet Oral BID    oxymetazoline  2 spray Each Nostril Q6H    sodium chloride  1 spray Each Nostril Q4H While awake    heparin (porcine)  5,000 Units Subcutaneous Q8H    ceFAZolin (ANCEF) IVPB  2 g Intravenous Q8H     PRN Meds: sodium chloride flush, acetaminophen, oxyCODONE **OR** oxyCODONE, polyethylene glycol, bisacodyl, naloxone, hydrALAZINE, promethazine **OR** ondansetron      Intake/Output Summary (Last 24 hours) at 7/4/2020 0958  Last data filed at 7/4/2020 0919  Gross per 24 hour   Intake 2964 ml   Output 3015 ml   Net -51 ml       Physical Exam Performed:    BP (!) 142/97   Pulse 78   Temp 98 °F (36.7 °C) (Oral)   Resp 21   Ht 5' 7\" (1.702 m)   Wt 187 lb (84.8 kg)   SpO2 100%   BMI 29.29 kg/m²     General appearance: No apparent distress, appears stated age and cooperative. HEENT: Pupils equal, round, and reactive to light. Conjunctivae/corneas clear. Neck: Supple, with full range of motion. No jugular venous distention. Trachea midline. Respiratory:  Normal respiratory effort. Clear to auscultation, bilaterally without Rales/Wheezes/Rhonchi. Cardiovascular: Regular rate and rhythm with normal S1/S2 without murmurs, rubs or gallops. Abdomen: Soft, non-tender, non-distended with normal bowel sounds. Musculoskeletal: No clubbing, cyanosis or edema bilaterally.   Full range of motion without deformity. Skin: Skin color, texture, turgor normal.  No rashes or lesions. Neurologic:  Neurovascularly intact without any focal sensory/motor deficits. Cranial nerves: II-XII intact, grossly non-focal.  Physical exam unchanged from 7/3        Labs:   Recent Labs     07/02/20  0457 07/03/20  0506   WBC 16.8* 11.8*   HGB 11.1* 11.7*   HCT 34.4* 36.3    335     Recent Labs     07/03/20  1820 07/04/20  0011 07/04/20  0554    139 140   K 3.9 4.1 4.0    106 110   CO2 23 23 22   BUN 11 9 7   CREATININE 0.8 0.6 0.6   CALCIUM 9.9 9.5 8.9     No results for input(s): AST, ALT, BILIDIR, BILITOT, ALKPHOS in the last 72 hours. No results for input(s): INR in the last 72 hours. No results for input(s): [place] in the last 72 hours. Urinalysis:      Lab Results   Component Value Date    NITRU Negative 07/04/2020    WBCUA 10-20 07/03/2020    BACTERIA Rare 07/03/2020    RBCUA 3-4 07/03/2020    BLOODU Negative 07/04/2020    SPECGRAV 1.010 07/04/2020    GLUCOSEU Negative 07/04/2020       Radiology:  MRI BRAIN W WO CONTRAST   Final Result      1. Status post debulking of pituitary macroadenoma, previously identified on the study from 2/7/2020 along the central and right portions of the pituitary including some extension of macroadenoma into the right cavernous sinus. Currently, the    central/medial portions of the tumor had been removed, with some residual tumor along the far right lateral sella turcica and right cavernous sinus regions as described above. Postoperative changes are noted within the ethmoid and sphenoid sinuses as    well as within the sella turcica itself. No mass effect on the optic chiasm or suprasellar cistern regions.                  Assessment/Plan:    59-year-old lady status post transsphenoidal pituitary resection on July 1    Status post transsphenoidal resection of the pituitary  ENT and neurosurgery following    Hypertension  Off blood pressure medication  Blood pressure well controlled    Postoperative diabetes insipidus  Status post desmopressin  Continue to monitor urine output currently seems around 2.5 L with normal sodium  Nephrology consulted    DVT Prophylaxis: lovenox  Diet: DIET GENERAL; No Drinking Straw  Code Status: Full Code    PT/OT Eval Status: ordered     Siddhratha Hilton MD

## 2020-07-04 NOTE — DISCHARGE SUMMARY
Neurosurgery Discharge Summary    Patient ID:   Manuel Lieberman  [id number]  20 y.o.  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 Patient tolerated the procedure well. Post-operatively admitted to ICU where diet advanced and pain well controlled. She progressed into compensated DI on POD 1 without need for DDAVP. This resolved by POD 2 and her urine output continued to be monitored closely. She was mobilized without difficulty. Denied rhinorrhea. Discharged on POD 3 to home. Discharge Instructions:   -No bending, lifting, or strenuous activit  -Transsphenoidal precautions (cough/sneeze with mouth open, no straws)   -Do not drive or operate heavy machinery while on narcotics   -Please call the office or return to ED for fever >101.5 or purulent drainage from wound     Diet:   common adult     Follow Up Instructions:    To office in: in 2 weeks   Please call [phone] to confirm appointment     Condition on Discharge: excellent    Discharge Medications:     Medication List      START taking these medications    sennosides-docusate sodium 8.6-50 MG tablet  Commonly known as:  SENOKOT-S  Take 1 tablet by mouth 2 times daily        CONTINUE taking these medications    triamterene-hydrochlorothiazide 75-50 MG per tablet  Commonly known as:  Maxzide  Take 1 tablet by mouth daily        STOP taking these medications    fluticasone 50 MCG/ACT nasal spray  Commonly known as:  FLONASE     ibuprofen 600 MG tablet  Commonly known as:  ADVIL;MOTRIN           Where to Get Your Medications      You can get these medications from any pharmacy    You don't need a prescription for these medications  · sennosides-docusate sodium 8.6-50 MG tablet         Allergies:   Allergies   Allergen Reactions    Metoprolol Nausea Only    Macrobid [Nitrofurantoin] Nausea And Vomiting         Seth Liz MD, PhD  [address], [address], [address] [phone] (c), [phone] (o)   7/4/2020  9:03 AM

## 2020-07-05 LAB
IGF-1 (INSULIN-LIKE GROWTH I): 91 NG/ML (ref 40–244)
INSULIN-LIKE GROWTH FACTOR-1 Z-SCORE: -0.5

## 2020-07-08 DIAGNOSIS — D35.2 PITUITARY ADENOMA (HCC): ICD-10-CM

## 2020-07-14 ENCOUNTER — OFFICE VISIT (OUTPATIENT)
Dept: ENT CLINIC | Age: 65
End: 2020-07-14

## 2020-07-14 VITALS
DIASTOLIC BLOOD PRESSURE: 94 MMHG | HEIGHT: 67 IN | BODY MASS INDEX: 29.32 KG/M2 | WEIGHT: 186.8 LBS | HEART RATE: 106 BPM | SYSTOLIC BLOOD PRESSURE: 140 MMHG | TEMPERATURE: 97.2 F

## 2020-07-14 PROCEDURE — 99024 POSTOP FOLLOW-UP VISIT: CPT | Performed by: OTOLARYNGOLOGY

## 2020-07-14 NOTE — PROGRESS NOTES
S/P endoscopic pituitary. No rhinorrhea or bleeding. Healing well. Saline rinses twice a day. Saline sprays 5-6 times a day. F/U in 4 weeks.

## 2020-07-27 ENCOUNTER — HOSPITAL ENCOUNTER (OUTPATIENT)
Age: 65
Discharge: HOME | End: 2020-07-27
Payer: COMMERCIAL

## 2020-07-27 ENCOUNTER — HOSPITAL ENCOUNTER (OUTPATIENT)
Dept: GENERAL RADIOLOGY | Age: 65
Discharge: HOME | End: 2020-07-27
Payer: COMMERCIAL

## 2020-07-27 PROCEDURE — 71045 X-RAY EXAM CHEST 1 VIEW: CPT

## 2020-07-29 ENCOUNTER — TELEPHONE (OUTPATIENT)
Dept: INTERNAL MEDICINE CLINIC | Age: 65
End: 2020-07-29

## 2020-07-29 NOTE — TELEPHONE ENCOUNTER
----- Message from Zulma Dacosta sent at 7/29/2020  4:11 PM EDT -----  Subject: Message to Provider    QUESTIONS  Information for Provider? dr. Booker Rowe pt surgeon orderd chest x-ray pt felt   like something lodged in her throat or chest .he told her he would not go   over x-rays her pcp would do that. please call her back  ---------------------------------------------------------------------------  --------------  CALL BACK INFO  What is the best way for the office to contact you? OK to leave message on   voicemail  Preferred Call Back Phone Number? [phone]  ---------------------------------------------------------------------------  --------------  SCRIPT ANSWERS  Relationship to Patient?  Self

## 2020-08-11 ENCOUNTER — OFFICE VISIT (OUTPATIENT)
Dept: ENT CLINIC | Age: 65
End: 2020-08-11
Payer: COMMERCIAL

## 2020-08-11 VITALS — TEMPERATURE: 98.4 F

## 2020-08-11 PROCEDURE — 31237 NSL/SINS NDSC SURG BX POLYPC: CPT | Performed by: OTOLARYNGOLOGY

## 2020-08-11 PROCEDURE — 99024 POSTOP FOLLOW-UP VISIT: CPT | Performed by: OTOLARYNGOLOGY

## 2020-08-11 NOTE — PROGRESS NOTES
Status post endoscopic pituitary surgery. Bilateral nasal cavity crusting including the sphenoid sinus identified with nasal obstruction. The nasal cavity was debrided and the sella is healing nicely. No evidence of CSF rhinorrhea. Continue with saline nasal sprays and irrigations. Follow up in 1 months. Nasal Endoscopy with Debridement  Pre Op Dx: Nasal congestion, post pituitary surgery  Post Op Dx: Same  Procedure: Nasal endoscopy with debridement left  Anesthesia: 2% lidocaine with afrin tiopical  EBL: None  Specimens: None  Findings: Bilateral nasal cavity and sinus crusting and mucus. Procedure:    After the application of afrin and pontocaine the nasal sinus cavity was debrided utilizing a zero degree yunior endoscope with Kerrison rongeurs and goff suctions on the left side. The sinus lining was healing well. No evidence of bleeding or rhinorrhea was noted. Tolerated well.     Complications: None

## 2020-09-10 ENCOUNTER — OFFICE VISIT (OUTPATIENT)
Dept: ENT CLINIC | Age: 65
End: 2020-09-10
Payer: COMMERCIAL

## 2020-09-10 VITALS
SYSTOLIC BLOOD PRESSURE: 154 MMHG | HEIGHT: 67 IN | WEIGHT: 191.8 LBS | HEART RATE: 88 BPM | BODY MASS INDEX: 30.1 KG/M2 | DIASTOLIC BLOOD PRESSURE: 103 MMHG | TEMPERATURE: 97.2 F

## 2020-09-10 PROCEDURE — 31231 NASAL ENDOSCOPY DX: CPT | Performed by: OTOLARYNGOLOGY

## 2020-09-10 PROCEDURE — 99024 POSTOP FOLLOW-UP VISIT: CPT | Performed by: OTOLARYNGOLOGY

## 2020-09-10 NOTE — PROGRESS NOTES
Status post endoscopic pituitary surgery. Bilateral nasal cavity crusting including the sphenoid sinus was not identified. No evidence of CSF rhinorrhea. Continue with saline nasal sprays and irrigations. Follow up as needed. Nasal Endoscopy    Pre OP: pituitary adenoma  Post OP: same  Procedure: Nasal endoscopy    After obtaining verbal consent from the patient 1% lidocaine with afrin was sprayed into the nasal cavities. After allowing a time for anesthesia, a nasal endoscope was placed into the nostril. The septum, inferior, and middle turbinates were examined. The middle meatus, and sphenoethmoid recess was examined bilaterally. Cultures were not obtained from the sinuses. There were no complications. Sella fully healed. Pertinent positives included: There was not edema and purulence in the left middle meatus. There was not edema and purulence at the right middle meatus. Polyps were not identified in the sinuses. Masses were not identified. Tolerated well without complication. I attest that I was present for and did the entire procedure myself.

## 2020-09-22 ENCOUNTER — TELEPHONE (OUTPATIENT)
Dept: INTERNAL MEDICINE CLINIC | Age: 65
End: 2020-09-22

## 2020-09-22 NOTE — TELEPHONE ENCOUNTER
Pt called in about her blood pressure medicine making her sick. She stopped her medicine a month ago to see if that was what was making her sick. She took one 3 days ago then another one and it started making her sick again. She wants switch from triamterene-hydrochlorothiazide 75-50 mg to something else.

## 2020-09-23 RX ORDER — HYDROCHLOROTHIAZIDE 25 MG/1
25 TABLET ORAL EVERY MORNING
Qty: 30 TABLET | Refills: 0 | Status: SHIPPED | OUTPATIENT
Start: 2020-09-23 | End: 2021-03-12

## 2020-09-23 NOTE — TELEPHONE ENCOUNTER
Change to HCTZ - sent to pharmacy. Take first thing in the morning, with food if having some nausea. F/u in 1 week with BP log for BP check.   [place]

## 2021-01-12 ENCOUNTER — HOSPITAL ENCOUNTER (OUTPATIENT)
Age: 66
Discharge: HOME | End: 2021-01-12
Payer: COMMERCIAL

## 2021-01-12 ENCOUNTER — HOSPITAL ENCOUNTER (OUTPATIENT)
Dept: MRI IMAGING | Age: 66
Discharge: HOME | End: 2021-01-12
Payer: COMMERCIAL

## 2021-01-12 DIAGNOSIS — D35.2 PITUITARY ADENOMA (HCC): ICD-10-CM

## 2021-01-12 LAB
BUN BLDV-MCNC: 12 MG/DL (ref 7–20)
CREAT SERPL-MCNC: 0.8 MG/DL (ref 0.6–1.2)
GFR AFRICAN AMERICAN: >60
GFR NON-AFRICAN AMERICAN: >60

## 2021-01-12 PROCEDURE — 36415 COLL VENOUS BLD VENIPUNCTURE: CPT

## 2021-01-12 PROCEDURE — 2580000003 HC RX 258: Performed by: NEUROLOGICAL SURGERY

## 2021-01-12 PROCEDURE — 70553 MRI BRAIN STEM W/O & W/DYE: CPT

## 2021-01-12 PROCEDURE — 6360000004 HC RX CONTRAST MEDICATION: Performed by: NEUROLOGICAL SURGERY

## 2021-01-12 PROCEDURE — A9577 INJ MULTIHANCE: HCPCS | Performed by: NEUROLOGICAL SURGERY

## 2021-01-12 PROCEDURE — 84520 ASSAY OF UREA NITROGEN: CPT

## 2021-01-12 PROCEDURE — 82565 ASSAY OF CREATININE: CPT

## 2021-01-12 RX ORDER — SODIUM CHLORIDE 0.9 % (FLUSH) 0.9 %
10 SYRINGE (ML) INJECTION PRN
Status: DISCONTINUED | OUTPATIENT
Start: 2021-01-12 | End: 2021-01-13 | Stop reason: HOSPADM

## 2021-01-12 RX ADMIN — GADOBENATE DIMEGLUMINE 17 ML: 529 INJECTION, SOLUTION INTRAVENOUS at 08:51

## 2021-01-12 RX ADMIN — Medication 10 ML: at 08:52

## 2021-02-09 ENCOUNTER — TELEPHONE (OUTPATIENT)
Dept: RADIATION ONCOLOGY | Age: 66
End: 2021-02-09

## 2021-02-09 DIAGNOSIS — D35.2 PITUITARY ADENOMA (HCC): Primary | ICD-10-CM

## 2021-02-09 NOTE — TELEPHONE ENCOUNTER
Spoke with patient and informed her that her MRI is now scheduled for 2/18 with an arrival time of 0830 and scan time of 0900. Instructed patient to go in through main entrance and down to level 1 to outpatient diagnostics. Patient confirmed. Encouraged patient to call with any further questions.

## 2021-02-18 ENCOUNTER — HOSPITAL ENCOUNTER (OUTPATIENT)
Dept: MRI IMAGING | Age: 66
Discharge: HOME | End: 2021-02-18
Payer: COMMERCIAL

## 2021-02-18 ENCOUNTER — TELEPHONE (OUTPATIENT)
Dept: RADIATION ONCOLOGY | Age: 66
End: 2021-02-18

## 2021-02-18 DIAGNOSIS — D35.2 PITUITARY ADENOMA (HCC): Primary | ICD-10-CM

## 2021-02-18 DIAGNOSIS — D35.2 PITUITARY ADENOMA (HCC): ICD-10-CM

## 2021-02-18 NOTE — TELEPHONE ENCOUNTER
MRI is now authorized post peer to peer with Dr. Narendra Thomason. Patient rescheduled for Thursday February 25 at 1600. Spoke with Rachel Cabrera from MRI who stated this date and time are okay. This RN spoke with the patient and gave her the arrival time of 3:30 for an MRI at 4pm.  Patient stated understanding. This RN then spoke with Ger Watson from central scheduling who rescheduled the patient. This RN will order a renal panel for the MRI.

## 2021-02-25 ENCOUNTER — HOSPITAL ENCOUNTER (OUTPATIENT)
Dept: MRI IMAGING | Age: 66
Discharge: HOME | End: 2021-02-25
Payer: COMMERCIAL

## 2021-02-25 DIAGNOSIS — D35.2 PITUITARY ADENOMA (HCC): ICD-10-CM

## 2021-02-25 LAB
GFR AFRICAN AMERICAN: >60
GFR NON-AFRICAN AMERICAN: >60
PERFORMED ON: NORMAL
POC CREATININE: 0.7 MG/DL (ref 0.6–1.2)
POC SAMPLE TYPE: NORMAL

## 2021-02-25 PROCEDURE — 6360000004 HC RX CONTRAST MEDICATION: Performed by: NEUROLOGICAL SURGERY

## 2021-02-25 PROCEDURE — 82565 ASSAY OF CREATININE: CPT

## 2021-02-25 PROCEDURE — 70553 MRI BRAIN STEM W/O & W/DYE: CPT

## 2021-02-25 PROCEDURE — A9579 GAD-BASE MR CONTRAST NOS,1ML: HCPCS | Performed by: NEUROLOGICAL SURGERY

## 2021-02-25 RX ADMIN — GADOTERIDOL 19 ML: 279.3 INJECTION, SOLUTION INTRAVENOUS at 17:24

## 2021-03-04 ENCOUNTER — PRE-PROCEDURE TELEPHONE (OUTPATIENT)
Dept: RADIATION ONCOLOGY | Age: 66
End: 2021-03-04

## 2021-03-04 DIAGNOSIS — D35.2 PITUITARY ADENOMA (HCC): Primary | ICD-10-CM

## 2021-03-04 NOTE — PROGRESS NOTES
Patient was called today for [facility] consult as requested by Dr. David Kennedy. After consultation patient has decided to go forward with [facility] radiosurgery. Patient was also sent patient [facility] folders as well as a link to the video [employer] from the Patient's Perspective video. Patient also was emailed Dr. Rufina Burton biography. Patient's allergies, medications, and medical and surgery history were reviewed. Also reviewed pre-procedure instructions and provided patient with a written copy of the same. Patient will call with date of his/her H&P. We discussed the patient's date of procedure will be on 3/22 at 0600. All patient's questions were answered. Encouraged to call with any further questions or concerns.     Lauren Nichols RN

## 2021-03-12 ENCOUNTER — OFFICE VISIT (OUTPATIENT)
Dept: INTERNAL MEDICINE CLINIC | Age: 66
End: 2021-03-12
Payer: COMMERCIAL

## 2021-03-12 VITALS
OXYGEN SATURATION: 98 % | DIASTOLIC BLOOD PRESSURE: 80 MMHG | BODY MASS INDEX: 30.79 KG/M2 | WEIGHT: 196.6 LBS | TEMPERATURE: 97 F | SYSTOLIC BLOOD PRESSURE: 132 MMHG | HEART RATE: 84 BPM

## 2021-03-12 DIAGNOSIS — D35.2 PITUITARY ADENOMA (HCC): ICD-10-CM

## 2021-03-12 DIAGNOSIS — I10 ESSENTIAL HYPERTENSION: ICD-10-CM

## 2021-03-12 DIAGNOSIS — Z01.818 PREOP EXAM FOR INTERNAL MEDICINE: ICD-10-CM

## 2021-03-12 DIAGNOSIS — N89.8 VAGINAL ITCHING: ICD-10-CM

## 2021-03-12 DIAGNOSIS — Z01.818 PREOP EXAM FOR INTERNAL MEDICINE: Primary | ICD-10-CM

## 2021-03-12 DIAGNOSIS — R30.0 DYSURIA: ICD-10-CM

## 2021-03-12 LAB
ANION GAP SERPL CALCULATED.3IONS-SCNC: 10 MMOL/L (ref 3–16)
BILIRUBIN, POC: NORMAL
BLOOD URINE, POC: NORMAL
BUN BLDV-MCNC: 6 MG/DL (ref 7–20)
CALCIUM SERPL-MCNC: 9.6 MG/DL (ref 8.3–10.6)
CANDIDA SPECIES, DNA PROBE: NORMAL
CHLORIDE BLD-SCNC: 107 MMOL/L (ref 99–110)
CLARITY, POC: NORMAL
CO2: 28 MMOL/L (ref 21–32)
COLOR, POC: NORMAL
CREAT SERPL-MCNC: 0.7 MG/DL (ref 0.6–1.2)
GARDNERELLA VAGINALIS, DNA PROBE: NORMAL
GFR AFRICAN AMERICAN: >60
GFR NON-AFRICAN AMERICAN: >60
GLUCOSE BLD-MCNC: 99 MG/DL (ref 70–99)
GLUCOSE URINE, POC: NORMAL
HCT VFR BLD CALC: 36.9 % (ref 36–48)
HEMOGLOBIN: 12.1 G/DL (ref 12–16)
KETONES, POC: NORMAL
LEUKOCYTE EST, POC: NORMAL
MCH RBC QN AUTO: 30.4 PG (ref 26–34)
MCHC RBC AUTO-ENTMCNC: 32.9 G/DL (ref 31–36)
MCV RBC AUTO: 92.6 FL (ref 80–100)
NITRITE, POC: POSITIVE
PDW BLD-RTO: 15.3 % (ref 12.4–15.4)
PH, POC: 7
PLATELET # BLD: 361 K/UL (ref 135–450)
PMV BLD AUTO: 8.4 FL (ref 5–10.5)
POTASSIUM SERPL-SCNC: 4.7 MMOL/L (ref 3.5–5.1)
PROTEIN, POC: NORMAL
RBC # BLD: 3.99 M/UL (ref 4–5.2)
SODIUM BLD-SCNC: 145 MMOL/L (ref 136–145)
SPECIFIC GRAVITY, POC: >=1.03
TRICHOMONAS VAGINALIS DNA: NORMAL
UROBILINOGEN, POC: 0.2
WBC # BLD: 6.7 K/UL (ref 4–11)

## 2021-03-12 PROCEDURE — 99214 OFFICE O/P EST MOD 30 MIN: CPT | Performed by: NURSE PRACTITIONER

## 2021-03-12 PROCEDURE — 81002 URINALYSIS NONAUTO W/O SCOPE: CPT | Performed by: NURSE PRACTITIONER

## 2021-03-12 RX ORDER — CIPROFLOXACIN 250 MG/1
250 TABLET, FILM COATED ORAL 2 TIMES DAILY
Qty: 6 TABLET | Refills: 0 | Status: SHIPPED | OUTPATIENT
Start: 2021-03-12 | End: 2022-06-29 | Stop reason: SDUPTHER

## 2021-03-12 ASSESSMENT — ENCOUNTER SYMPTOMS
WHEEZING: 0
COUGH: 0
CHEST TIGHTNESS: 0
SHORTNESS OF BREATH: 0

## 2021-03-12 ASSESSMENT — PATIENT HEALTH QUESTIONNAIRE - PHQ9
SUM OF ALL RESPONSES TO PHQ9 QUESTIONS 1 & 2: 0
SUM OF ALL RESPONSES TO PHQ QUESTIONS 1-9: 0
2. FEELING DOWN, DEPRESSED OR HOPELESS: 0
1. LITTLE INTEREST OR PLEASURE IN DOING THINGS: 0

## 2021-03-12 NOTE — PROGRESS NOTES
3/12/2021    This is a 63 y.o. female   Chief Complaint   Patient presents with    Pre-op Exam     3/22, Dr. Neda Ramanjayce Navarro is a 63 y.o.  female who comes for a preoperative exam.  She  is referred by Dr. Nina Lopez for perioperative risk determination for upcoming surgery for gamma knife radiosurgery for a pituitary adenoma on 3/22/2021 at [facility]. Denies taking any asa, blood thinners, fish oil, NSAIDs or herbals. Denies any previous complications with anesthesia. Priyanka Fitch returns for follow up of hypertension. Patient has not been taking Her medications as prescribed. Patient's blood pressure is  controlled. She stopped her HCTZ due to nausea. She reports home BP is running 130-135/ 75-80 without medication. Having dysuria, increased frequency, hesitancy started 3 days ago. Denies fever or chills. Also reports vaginal itching. Denies vaginal discharge. Last UTI was about a year ago. Mri 2/25/2021:  EXAM: MRI BRAIN W WO CONTRAST       INDICATION: Pituitary adenoma ([address]       COMPARISON: 1/12/2021       TECHNIQUE: Multiplanar, multisequence MR imaging of the head without and following infusion of IV contrast using pituitary protocol. IV contrast: 15 mL ProHance .       FINDINGS:        Postoperative changes of previous transsphenoidal resection of pituitary tumor. Mildly enlarged sella. There is redemonstration of residual enhancing tumor in the right aspect of the sella and right cavernous sinus measuring approximately 1.5 x 1.3 x 0.9    cm (anteroposterior by transverse by can order dimension). No significant interval change since 1/12/2021. Infundibular stalk deviated to left. Optic chiasm is normal. Left cavernous sinus is normal.               Impression       Status post transsphenoidal resection of pituitary adenoma. Stable residual tumor in the right aspect of sella and right cavernous sinus.       Past Medical History:   Diagnosis Date    Allergic rhinitis     Chicken pox     GERD (gastroesophageal reflux disease)     Hypertension     Kidney stone     Pituitary adenoma (HCC)     Pituitary microadenoma ([address]     Uterine fibroid      Past Surgical History:   Procedure Laterality Date    CRANIOTOMY N/A 7/1/2020    REDO ENDOSCOPIC ENDONASAL TRANSPHENOIDAL RESECTION OF PITUITARY TUMOR performed by Jese Ballard MD at [address] [place] [address] Left 9/24/13    EXCSION OF LEFT HAND DORSAL GANGLION CYST    HYSTERECTOMY      NASAL SINUS SURGERY N/A 7/1/2020    NEUROENDOSCOPY WITH REMOVAL OF PITUITARY TUMOR TRANSPHENOIDAL, STEREOTACTIC COMPUTER ASSISTED SURGERY performed [facility] Rafael Mott MD at [facility]  2013    CARRILLO AND BSO       Social History     Socioeconomic History    Marital status:      Spouse name: Not on file    Number of children: Not on file    Years of education: Not on file    Highest education level: Not on file   Occupational History    Not on file   Social Needs    Financial resource strain: Not hard at all   [employer] insecurity     Worry: Never true     Inability: Never true   [employer] needs     Medical: No     Non-medical: No   Tobacco Use    Smoking status: Never Smoker    Smokeless tobacco: Never Used   Substance and Sexual Activity    Alcohol use:  Yes     Alcohol/week: 1.0 standard drinks     Types: 1 Glasses of wine per week     Comment: 2-3 glasses wine/week    Drug use: No    Sexual activity: Yes     Partners: Male   Lifestyle    Physical activity     Days per week: Not on file     Minutes per session: Not on file    Stress: Not on file   Relationships    Social connections     Talks on phone: Not on file     Gets together: Not on file     Attends Denominational service: Not on file     Active member of club or organization: Not on file     Attends meetings of clubs or organizations: Not on file     Relationship status: Not on file    Intimate partner violence     Fear of reviewed. Constitutional:       General: She is not in acute distress. Appearance: Normal appearance. She is well-developed. She is not ill-appearing or diaphoretic. HENT:      Head: Normocephalic and atraumatic. Cardiovascular:      Rate and Rhythm: Normal rate and regular rhythm. Heart sounds: Normal heart sounds. No murmur. Pulmonary:      Effort: Pulmonary effort is normal. No respiratory distress. Breath sounds: Normal breath sounds. No wheezing or rhonchi. Abdominal:      General: Bowel sounds are normal.   Skin:     General: Skin is warm and dry. Neurological:      General: No focal deficit present. Mental Status: She is alert and oriented to person, place, and time. Psychiatric:         Mood and Affect: Mood and affect normal.         Behavior: Behavior normal.       Diagnosis  Assessment and Plan  1. Preop exam for internal medicine  Perioperative risk related to the patient's upcoming surgery is considered low. She is cleared for surgery. DVT prophylaxis per surgery team.  Pre-op exam was completed on 3/12/21.     - CBC; Future  - Basic Metabolic Panel; Future    2. Pituitary adenoma ([address]  Continue with planned gamma knife procedure    3. Dysuria  Stable, uncontrolled  UA consistent with UTI, covering with Cipro  Sending for culture  - POCT Urinalysis no Micro  - ciprofloxacin (CIPRO) 250 MG tablet; Take 1 tablet by mouth 2 times daily for 3 days  Dispense: 6 tablet; Refill: 0  - Culture, Urine    4. Vaginal itching  See #3  - Vaginal Pathogens Probes *A; Future    5.  Essential hypertension  Stable, controlled without medication at this time  Patient to monitor BP at home and we will consider restarting medication if she is not at goal    Follow up in 6 months and prn     Electronically signed by IVIS Eid CNP on 3/12/2021 at 9:02 AM

## 2021-03-14 LAB — URINE CULTURE, ROUTINE: NORMAL

## 2021-03-17 ENCOUNTER — HOSPITAL ENCOUNTER (OUTPATIENT)
Age: 66
Discharge: HOME | End: 2021-03-17
Payer: COMMERCIAL

## 2021-03-17 ENCOUNTER — TELEPHONE (OUTPATIENT)
Dept: RADIATION ONCOLOGY | Age: 66
End: 2021-03-17

## 2021-03-17 DIAGNOSIS — D35.2 PITUITARY ADENOMA (HCC): ICD-10-CM

## 2021-03-17 LAB
ALBUMIN SERPL-MCNC: 4.4 G/DL (ref 3.4–5)
ANION GAP SERPL CALCULATED.3IONS-SCNC: 10 MMOL/L (ref 3–16)
BUN BLDV-MCNC: 11 MG/DL (ref 7–20)
CALCIUM SERPL-MCNC: 9.4 MG/DL (ref 8.3–10.6)
CHLORIDE BLD-SCNC: 108 MMOL/L (ref 99–110)
CO2: 27 MMOL/L (ref 21–32)
CORTISOL - AM: 15.3 UG/DL (ref 4.3–22.4)
CREAT SERPL-MCNC: 0.7 MG/DL (ref 0.6–1.2)
ESTRADIOL LEVEL: 9 PG/ML
FOLLICLE STIMULATING HORMONE: 27.3 MIU/ML
GFR AFRICAN AMERICAN: >60
GFR NON-AFRICAN AMERICAN: >60
GLUCOSE BLD-MCNC: 113 MG/DL (ref 70–99)
LUTEINIZING HORMONE: 14 MIU/ML
PHOSPHORUS: 3.1 MG/DL (ref 2.5–4.9)
POTASSIUM SERPL-SCNC: 4.4 MMOL/L (ref 3.5–5.1)
PROLACTIN: 4.8 NG/ML
SODIUM BLD-SCNC: 145 MMOL/L (ref 136–145)
T3 FREE: 2.8 PG/ML (ref 2.3–4.2)
T4 FREE: 1 NG/DL (ref 0.9–1.8)

## 2021-03-17 NOTE — TELEPHONE ENCOUNTER
This RN spoke to the patient after talking to [facility] lab. Pt will have the rest of her pre procedure blood work completed today at the [facility].     Frankie Souza RN

## 2021-03-18 ENCOUNTER — PRE-PROCEDURE TELEPHONE (OUTPATIENT)
Dept: RADIATION ONCOLOGY | Age: 66
End: 2021-03-18

## 2021-03-18 ENCOUNTER — HOSPITAL ENCOUNTER (OUTPATIENT)
Age: 66
Discharge: HOME | End: 2021-03-18
Payer: COMMERCIAL

## 2021-03-18 LAB — ESTRADIOL LEVEL: 11 PG/ML

## 2021-03-18 PROCEDURE — 82024 ASSAY OF ACTH: CPT

## 2021-03-18 PROCEDURE — 83003 ASSAY GROWTH HORMONE (HGH): CPT

## 2021-03-18 PROCEDURE — 82670 ASSAY OF TOTAL ESTRADIOL: CPT

## 2021-03-18 PROCEDURE — 36415 COLL VENOUS BLD VENIPUNCTURE: CPT

## 2021-03-19 LAB
IGF-1 (INSULIN-LIKE GROWTH I): 99 NG/ML (ref 38–244)
INSULIN-LIKE GROWTH FACTOR-1 Z-SCORE: -0.3

## 2021-03-20 LAB — GROWTH HORMONE: 0.18 NG/ML (ref 0.05–8)

## 2021-03-22 ENCOUNTER — HOSPITAL ENCOUNTER (OUTPATIENT)
Dept: CT IMAGING | Age: 66
Discharge: HOME | End: 2021-03-22
Payer: COMMERCIAL

## 2021-03-22 ENCOUNTER — ANESTHESIA EVENT (OUTPATIENT)
Dept: RADIATION ONCOLOGY | Age: 66
End: 2021-03-22

## 2021-03-22 ENCOUNTER — HOSPITAL ENCOUNTER (OUTPATIENT)
Dept: RADIATION ONCOLOGY | Age: 66
Discharge: HOME | End: 2021-03-22
Payer: COMMERCIAL

## 2021-03-22 ENCOUNTER — HOSPITAL ENCOUNTER (OUTPATIENT)
Dept: MRI IMAGING | Age: 66
Discharge: HOME | End: 2021-03-22
Payer: COMMERCIAL

## 2021-03-22 ENCOUNTER — ANESTHESIA (OUTPATIENT)
Dept: RADIATION ONCOLOGY | Age: 66
End: 2021-03-22

## 2021-03-22 VITALS
HEART RATE: 85 BPM | DIASTOLIC BLOOD PRESSURE: 94 MMHG | WEIGHT: 194.7 LBS | RESPIRATION RATE: 18 BRPM | OXYGEN SATURATION: 98 % | TEMPERATURE: 98.6 F | HEIGHT: 67 IN | BODY MASS INDEX: 30.56 KG/M2 | SYSTOLIC BLOOD PRESSURE: 163 MMHG

## 2021-03-22 VITALS — DIASTOLIC BLOOD PRESSURE: 65 MMHG | OXYGEN SATURATION: 99 % | SYSTOLIC BLOOD PRESSURE: 107 MMHG

## 2021-03-22 DIAGNOSIS — E23.7 PITUITARY ABNORMALITY (HCC): ICD-10-CM

## 2021-03-22 DIAGNOSIS — Z92.3 STATUS POST GAMMA KNIFE TREATMENT: ICD-10-CM

## 2021-03-22 PROCEDURE — 6360000004 HC RX CONTRAST MEDICATION: Performed by: NEUROLOGICAL SURGERY

## 2021-03-22 PROCEDURE — 77371 SRS MULTISOURCE: CPT

## 2021-03-22 PROCEDURE — 6360000002 HC RX W HCPCS

## 2021-03-22 PROCEDURE — 77300 RADIATION THERAPY DOSE PLAN: CPT

## 2021-03-22 PROCEDURE — 70470 CT HEAD/BRAIN W/O & W/DYE: CPT

## 2021-03-22 PROCEDURE — 3700000000 HC ANESTHESIA ATTENDED CARE

## 2021-03-22 PROCEDURE — 7100000011 HC PHASE II RECOVERY - ADDTL 15 MIN

## 2021-03-22 PROCEDURE — 7100000010 HC PHASE II RECOVERY - FIRST 15 MIN

## 2021-03-22 PROCEDURE — 3700000001 HC ADD 15 MINUTES (ANESTHESIA)

## 2021-03-22 PROCEDURE — 70553 MRI BRAIN STEM W/O & W/DYE: CPT

## 2021-03-22 PROCEDURE — 77295 3-D RADIOTHERAPY PLAN: CPT

## 2021-03-22 PROCEDURE — 77334 RADIATION TREATMENT AID(S): CPT

## 2021-03-22 PROCEDURE — 2580000003 HC RX 258: Performed by: NURSE ANESTHETIST, CERTIFIED REGISTERED

## 2021-03-22 PROCEDURE — 2500000003 HC RX 250 WO HCPCS: Performed by: NEUROLOGICAL SURGERY

## 2021-03-22 PROCEDURE — 6370000000 HC RX 637 (ALT 250 FOR IP): Performed by: NEUROLOGICAL SURGERY

## 2021-03-22 PROCEDURE — A9579 GAD-BASE MR CONTRAST NOS,1ML: HCPCS | Performed by: NEUROLOGICAL SURGERY

## 2021-03-22 PROCEDURE — 6360000002 HC RX W HCPCS: Performed by: NURSE ANESTHETIST, CERTIFIED REGISTERED

## 2021-03-22 PROCEDURE — 6360000002 HC RX W HCPCS: Performed by: NEUROLOGICAL SURGERY

## 2021-03-22 PROCEDURE — 2580000003 HC RX 258: Performed by: NEUROLOGICAL SURGERY

## 2021-03-22 PROCEDURE — 77336 RADIATION PHYSICS CONSULT: CPT

## 2021-03-22 RX ORDER — PROPOFOL 10 MG/ML
INJECTION, EMULSION INTRAVENOUS PRN
Status: DISCONTINUED | OUTPATIENT
Start: 2021-03-22 | End: 2021-03-22 | Stop reason: SDUPTHER

## 2021-03-22 RX ORDER — ONDANSETRON 2 MG/ML
4 INJECTION INTRAMUSCULAR; INTRAVENOUS EVERY 6 HOURS PRN
Status: DISCONTINUED | OUTPATIENT
Start: 2021-03-22 | End: 2021-03-23 | Stop reason: HOSPADM

## 2021-03-22 RX ORDER — LORAZEPAM 2 MG/ML
1 INJECTION INTRAMUSCULAR ONCE
Status: COMPLETED | OUTPATIENT
Start: 2021-03-22 | End: 2021-03-22

## 2021-03-22 RX ORDER — 0.9 % SODIUM CHLORIDE 0.9 %
500 INTRAVENOUS SOLUTION INTRAVENOUS ONCE
Status: COMPLETED | OUTPATIENT
Start: 2021-03-22 | End: 2021-03-22

## 2021-03-22 RX ORDER — ACETAMINOPHEN 325 MG/1
650 TABLET ORAL EVERY 4 HOURS PRN
Status: DISCONTINUED | OUTPATIENT
Start: 2021-03-22 | End: 2021-03-23 | Stop reason: HOSPADM

## 2021-03-22 RX ORDER — BACITRACIN, NEOMYCIN, POLYMYXIN B 400; 3.5; 5 [USP'U]/G; MG/G; [USP'U]/G
OINTMENT TOPICAL ONCE
Status: DISCONTINUED | OUTPATIENT
Start: 2021-03-22 | End: 2021-03-23 | Stop reason: HOSPADM

## 2021-03-22 RX ORDER — LIDOCAINE HYDROCHLORIDE 10 MG/ML
30 INJECTION, SOLUTION EPIDURAL; INFILTRATION; INTRACAUDAL; PERINEURAL ONCE
Status: COMPLETED | OUTPATIENT
Start: 2021-03-22 | End: 2021-03-22

## 2021-03-22 RX ORDER — SODIUM CHLORIDE 9 MG/ML
INJECTION, SOLUTION INTRAVENOUS CONTINUOUS PRN
Status: DISCONTINUED | OUTPATIENT
Start: 2021-03-22 | End: 2021-03-22 | Stop reason: SDUPTHER

## 2021-03-22 RX ORDER — LORAZEPAM 2 MG/ML
INJECTION INTRAMUSCULAR
Status: COMPLETED
Start: 2021-03-22 | End: 2021-03-22

## 2021-03-22 RX ORDER — BUPIVACAINE HYDROCHLORIDE 5 MG/ML
30 INJECTION, SOLUTION EPIDURAL; INTRACAUDAL ONCE
Status: COMPLETED | OUTPATIENT
Start: 2021-03-22 | End: 2021-03-22

## 2021-03-22 RX ORDER — DEXAMETHASONE SODIUM PHOSPHATE 4 MG/ML
4 INJECTION, SOLUTION INTRA-ARTICULAR; INTRALESIONAL; INTRAMUSCULAR; INTRAVENOUS; SOFT TISSUE ONCE
Status: DISCONTINUED | OUTPATIENT
Start: 2021-03-22 | End: 2021-03-23 | Stop reason: HOSPADM

## 2021-03-22 RX ORDER — SODIUM BICARBONATE 42 MG/ML
1.5 INJECTION, SOLUTION INTRAVENOUS ONCE
Status: COMPLETED | OUTPATIENT
Start: 2021-03-22 | End: 2021-03-22

## 2021-03-22 RX ADMIN — LORAZEPAM 1 MG: 2 INJECTION INTRAMUSCULAR; INTRAVENOUS at 12:01

## 2021-03-22 RX ADMIN — PROPOFOL 50 MG: 10 INJECTION, EMULSION INTRAVENOUS at 08:04

## 2021-03-22 RX ADMIN — PROPOFOL 150 MG: 10 INJECTION, EMULSION INTRAVENOUS at 07:40

## 2021-03-22 RX ADMIN — LORAZEPAM 1 MG: 2 INJECTION INTRAMUSCULAR at 10:04

## 2021-03-22 RX ADMIN — ACETAMINOPHEN 650 MG: 325 TABLET ORAL at 10:15

## 2021-03-22 RX ADMIN — PROPOFOL 50 MG: 10 INJECTION, EMULSION INTRAVENOUS at 07:58

## 2021-03-22 RX ADMIN — IOPAMIDOL 80 ML: 755 INJECTION, SOLUTION INTRAVENOUS at 10:40

## 2021-03-22 RX ADMIN — LIDOCAINE HYDROCHLORIDE 30 ML: 10 INJECTION, SOLUTION EPIDURAL; INFILTRATION; INTRACAUDAL; PERINEURAL at 08:00

## 2021-03-22 RX ADMIN — SODIUM CHLORIDE: 9 INJECTION, SOLUTION INTRAVENOUS at 07:37

## 2021-03-22 RX ADMIN — PROPOFOL 50 MG: 10 INJECTION, EMULSION INTRAVENOUS at 07:45

## 2021-03-22 RX ADMIN — PROPOFOL 50 MG: 10 INJECTION, EMULSION INTRAVENOUS at 07:49

## 2021-03-22 RX ADMIN — PROPOFOL 50 MG: 10 INJECTION, EMULSION INTRAVENOUS at 07:55

## 2021-03-22 RX ADMIN — PROPOFOL 50 MG: 10 INJECTION, EMULSION INTRAVENOUS at 07:52

## 2021-03-22 RX ADMIN — SODIUM BICARBONATE 1.5 MEQ: 42 INJECTION, SOLUTION INTRAVENOUS at 08:00

## 2021-03-22 RX ADMIN — PROPOFOL 50 MG: 10 INJECTION, EMULSION INTRAVENOUS at 08:07

## 2021-03-22 RX ADMIN — PROPOFOL 50 MG: 10 INJECTION, EMULSION INTRAVENOUS at 08:13

## 2021-03-22 RX ADMIN — ACETAMINOPHEN 650 MG: 325 TABLET ORAL at 15:15

## 2021-03-22 RX ADMIN — PROPOFOL 50 MG: 10 INJECTION, EMULSION INTRAVENOUS at 08:10

## 2021-03-22 RX ADMIN — PROPOFOL 50 MG: 10 INJECTION, EMULSION INTRAVENOUS at 08:01

## 2021-03-22 RX ADMIN — PROPOFOL 50 MG: 10 INJECTION, EMULSION INTRAVENOUS at 07:43

## 2021-03-22 RX ADMIN — PHENYLEPHRINE HYDROCHLORIDE 100 MCG: 10 INJECTION INTRAVENOUS at 08:10

## 2021-03-22 RX ADMIN — GADOTERIDOL 19 ML: 279.3 INJECTION, SOLUTION INTRAVENOUS at 09:18

## 2021-03-22 RX ADMIN — BUPIVACAINE HYDROCHLORIDE 150 MG: 5 INJECTION, SOLUTION EPIDURAL; INTRACAUDAL; PERINEURAL at 08:00

## 2021-03-22 RX ADMIN — LORAZEPAM 1 MG: 2 INJECTION INTRAMUSCULAR; INTRAVENOUS at 10:04

## 2021-03-22 RX ADMIN — SODIUM CHLORIDE 500 ML: 9 INJECTION, SOLUTION INTRAVENOUS at 07:08

## 2021-03-22 RX ADMIN — ONDANSETRON 4 MG: 2 INJECTION INTRAMUSCULAR; INTRAVENOUS at 07:08

## 2021-03-22 ASSESSMENT — PAIN SCALES - GENERAL: PAINLEVEL_OUTOF10: 8

## 2021-03-22 NOTE — ANESTHESIA POSTPROCEDURE EVALUATION
Department of Anesthesiology  Postprocedure Note    Patient: Katja Ball  MRN: [id number]  YOB: 1955  Date of evaluation: 3/22/2021  Time:  1:36 PM     Procedure Summary     Date: 03/22/21 Room / Location: [facility]    Anesthesia Start: [facility] Anesthesia Stop: Carlynhisandie Adkinsjanneth    Procedure: GAMMAKNIFE W ANESTHESIA Diagnosis: Benign neoplasm of pituitary gland    Scheduled Providers: Karrie Kc MD Responsible Provider: Karrie Kc MD    Anesthesia Type: MAC ASA Status: 3          Anesthesia Type: MAC    Lena Phase I:      Lena Phase II:      Last vitals: Reviewed and per EMR flowsheets.        Anesthesia Post Evaluation    Patient location during evaluation: PACU  Patient participation: complete - patient participated  Level of consciousness: awake and alert  Pain score: 0  Airway patency: patent  Nausea & Vomiting: no nausea and no vomiting  Complications: no  Cardiovascular status: hemodynamically stable  Respiratory status: acceptable  Hydration status: euvolemic

## 2021-03-22 NOTE — PROGRESS NOTES
3595  Patient arrived to [address] [place] [address] Way alert and oriented x 4 with daughter, Gee Madison. Patient is neurologically intact. PIV established without difficulty. Initial vitals WNL, and patient denies pain. Holly Weir, RN     8844  Gamma Knife RN Pre-Procedure Checklist     1. Has the patient ever had any surgery on the head or neck? Yes    -If yes, is the surgery site marked? N/A    2. Has the patient ever received radiation treatment to the head or neck? No      -If yes, is the treatment summary and/or disk of treatment plan available? N/A      -If the patient has had previous Gamma Knife radiosurgery has the most recent imaging been reviewed in the Gamma Plan? N/A      3. Has the patient received chemotherapy or immunotherapy in the past month? No      -If yes, list the agent and date of last treatment. N/A     4. . List the procedure-specific medications taken by the patient this morning:   -None    5. What is the patients GFR? 116 on 3/12/21    -For GFR 0-30 => no contrast at MRI    -For GFR 31-59 => single dose contrast at MRI    -For GFR >60 => double dose contrast at MRI    6. Does the patient require a pregnancy test per [address]?  no     -If yes, the result is: na    7. What is the patient's baseline CO2? 31    Arpan Dexter, RN    3890  Gamma Knife Frame Placement Time Out     1. Patient states name and birthdate correctly? Yes    2. Procedure listed on consent:  G-Frame placement and Gamma Knife radiosurgery for pituitary adenoma    3. Is this the correct procedure? Yes    4. Are the consents signed? Yes    5. Does the patient have only one benign target or lesion? Yes     -If yes, what side are we treating:  N/A - midline     -Is the side marked for laterality? N/A    6. Have films been reviewed today? Yes    7. Has the interim History and Physical form been completed? yes    8.   Has the neurosurgeon reviewed the [facility] RN Pre-Procedure Checklist? Yes    9. Does the patient require a pregnancy test per [address] [facility]? no     -If yes, the result was:  na    10. Are all present in agreement? Yes    Those present for time out:  Reema Noel RN, Dr. Juel Primrose, RN, AMANDA Ibrahim, [facility]  Patient received MAC under the supervision of Dr. Anshu Wasserman and Maine Turcios CRNA. This RN was present throughout [place] [address] and assumed care from anesthesia for Phase II recovery. The patient is awake and breathing easily. Patient denies pain. See Flow Sheet for vitals and Lena Score. Jc Christie RN     5897  After G-frame placement, patient was taken to MRI by this RN where SpO2 and pulse were monitored and remained stable throughout. Mae Dixon RN     1000  Patient tolerated the MRI study well, but during the last minute began having a panic attack. Pt was removed from the scanner at that time. PRN 1mg ativan administered per MD orders. Pt beginning to calm down. Mae Dixon RN    0752  Pt c/o posterior headache, 8/10, prior to CT. PRN tylenol given. Reema Noel RN     80  Pt assisted to CT by this RN. Pt tolerated well. Reema Noel RN    1999  Gamma Knife Radiation Delivery Time Out    1. Patient states name and birthdate correctly? Yes    2. Procedure listed on consent? Stereotactic Radiosurgery, Gamma Knife for pituitary adenoma    3. Is this the correct procedure? Yes    4. Does the patient have only one benign target or lesion? Yes    -If yes, what side are we treating? N/A - midline    -If yes, is the side marked for laterality? N/A    5. Has the final radiologist report been reviewed? yes    6. Has the patient received IV Dexamethasone prior to radiation delivery? N/A    7. Does the patient require Keppra or Ativan prior to treatment delivery? yes    8. Have the pin torques been rechecked? Yes    9. Is a CBCT required prior to treatment delivery? Yes    10.   Are all present in agreement? Yes    11. Those present for time out:  Dr. Marin Lincoln RN, AMANDA Leblanc, RN    1450  Patient brought to [facility] suite and placed on treatment couch. Sequential Compression Devices placed on BLE's per [address] VTE Protocol. AV monitoring in place and verified verbally with patient from console. Continuous SpO2 and pulse monitor visible and monitored by this RN. 1545   After Gamma Knife procedure, the G-frame was removed by AMANDA Wall and Mani Junior RN and fixation pin sites were observed for bleeding. None was noted. Pin sites were cleaned with alcohol and povidone-iodine. Dr. Fern Liu then placed sutures at all four pin sites. Pt did experience nausea after the frame was removed, and prn zofran was administered. VS remained stable. After a short time, pt's nausea subsided. Pt c/o posterior headache after frame was removed, tylenol given once nausea subsided. Pt states pain is improving. Patient met Phase II discharge criteria. Baseline neurological status unchanged. See discharge Lena score and vitals. Discharge instructions were reviewed with patient and daughter, Bronwyn Russell, who verbalized understanding using teach back method. A written copy of the instructions was given. Patient has follow up appointments scheduled. Patient was accompanied to transportation by this RN.     Ottoniel Mcclellan RN

## 2021-03-22 NOTE — ANESTHESIA PRE PROCEDURE
Department of Anesthesiology  Preprocedure Note       Name:  Geetha Willard   Age:  63 y.o.  :  1955                                          MRN:  [id number]         Date:  3/22/2021      Surgeon: * No surgeons listed *    Procedure: * No procedures listed *    Medications prior to admission:   Prior to Admission medications    Not on File       Current medications:    No current outpatient medications on file. Current Facility-Administered Medications   Medication Dose Route Frequency Provider Last Rate Last Admin    sodium bicarbonate 4.2 % injection 1.5 mEq  1.5 mEq Intradermal Once Dov Moser MD        0.9 % sodium chloride bolus  500 mL Intravenous Once Dov Moser  mL/hr at 21 0708 500 mL at 21 0708    bupivacaine (PF) (MARCAINE) 0.5 % injection 150 mg  30 mL Intradermal Once Dov Moser MD        lidocaine PF 1 % injection 30 mL  30 mL Intradermal Once Dov Moser MD        ondansetron [facility]) injection 4 mg  4 mg Intravenous Q6H PRN Dov Moser MD   4 mg at 21 0708    Dexamethasone Sodium Phosphate injection 4 mg  4 mg Intravenous Once Dov Moser MD        neomycin-bacitracin-polymyxin (NEOSPORIN) ointment   Topical Once Dov Moser MD        vitamin E capsule 100 Units  100 Units Oral Once Dov Moser MD           Allergies:     Allergies   Allergen Reactions    Metoprolol Nausea Only    Macrobid [Nitrofurantoin] Nausea And Vomiting       Problem List:    Patient Active Problem List   Diagnosis Code    Fatigue R53.83    Anxiety F41.9    Vitamin D deficiency E55.9    Gastroesophageal reflux disease K21.9    Mass of hand R22.30    Neoplastic disease D49.9    Swelling, mass, or lump in head and neck R22.0, R22.1    Pituitary adenoma ([facility] D35.2    Mixed hyperlipidemia E78.2    Essential hypertension I10    Allergic rhinitis due to pollen J30.1    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 ([facility] D35.2, D35.3    Overweight E66.3       Past Medical History:        Diagnosis Date    Allergic rhinitis     Chicken pox     GERD (gastroesophageal reflux disease)     Hypertension     Kidney stone     Pituitary adenoma ([facility]     Pituitary microadenoma ([facility]     Uterine fibroid        Past Surgical History:        Procedure Laterality Date    CRANIOTOMY N/A 7/1/2020    REDO ENDOSCOPIC ENDONASAL TRANSPHENOIDAL RESECTION OF PITUITARY TUMOR performed by Kellee López MD at [address] [place] [address] Left 9/24/13    EXCSION OF LEFT HAND DORSAL GANGLION CYST    HYSTERECTOMY      NASAL SINUS SURGERY N/A 7/1/2020    NEUROENDOSCOPY WITH REMOVAL OF PITUITARY TUMOR TRANSPHENOIDAL, STEREOTACTIC COMPUTER ASSISTED SURGERY performed by Antonio Martin MD at [facility]  2013    CARRILLO AND BSO         Social History:    Social History     Tobacco Use    Smoking status: Never Smoker    Smokeless tobacco: Never Used   Substance Use Topics    Alcohol use: Yes     Alcohol/week: 1.0 standard drinks     Types: 1 Glasses of wine per week     Comment: 2-3 glasses wine/week                                Counseling given: Not Answered      Vital Signs (Current):   Vitals:    03/22/21 0624 03/22/21 0726   BP: (!) 164/102 (!) 159/96   Pulse: 70    Resp: 18    Temp: 36.2 °C (97.2 °F)    TempSrc: Infrared    SpO2: 97%    Weight: 194 lb 11.2 oz (88.3 kg)    Height: 5' 7\" (1.702 m)                                               BP Readings from Last 3 Encounters:   03/22/21 (!) 159/96   03/12/21 132/80   09/10/20 (!) 154/103       NPO Status:                                                                                 BMI:   Wt Readings from Last 3 Encounters:   03/22/21 194 lb 11.2 oz (88.3 kg)   03/12/21 196 lb 9.6 oz (89.2 kg)   09/10/20 191 lb 12.8 oz (87 kg)     Body mass index is 30.49 kg/m².     CBC:   Lab Results   Component Value Date    WBC 6.7 03/12/2021    RBC 3.99 03/12/2021    HGB 12.1 03/12/2021    HCT 36.9 03/12/2021    MCV 92.6 03/12/2021    RDW 15.3 03/12/2021     03/12/2021       CMP:   Lab Results   Component Value Date     03/17/2021    K 4.4 03/17/2021     03/17/2021    CO2 27 03/17/2021    BUN 11 03/17/2021    CREATININE 0.7 03/17/2021    GFRAA >60 03/17/2021    GFRAA >60 05/02/2013    AGRATIO 1.9 01/27/2017    LABGLOM >60 03/17/2021    GLUCOSE 113 03/17/2021    PROT 7.0 01/27/2017    PROT 7.7 02/03/2013    CALCIUM 9.4 03/17/2021    BILITOT 0.4 01/27/2017    ALKPHOS 139 01/27/2017    AST 18 01/27/2017    ALT 16 01/27/2017       POC Tests: No results for input(s): POCGLU, POCNA, POCK, POCCL, POCBUN, POCHEMO, POCHCT in the last 72 hours. Coags:   Lab Results   Component Value Date    PROTIME 11.9 06/26/2020    INR 1.03 06/26/2020    APTT 31.9 06/26/2020       HCG (If Applicable): No results found for: PREGTESTUR, PREGSERUM, HCG, HCGQUANT     ABGs: No results found for: PHART, PO2ART, MPN8FTO, GYF3QCF, BEART, D4LONXCP     Type & Screen (If Applicable):  No results found for: LABABO, LABRH    Drug/Infectious Status (If Applicable):  No results found for: HIV, HEPCAB    COVID-19 Screening (If Applicable):   Lab Results   Component Value Date    COVID19 Not Detected 06/25/2020           Anesthesia Evaluation  Patient summary reviewed  Airway: Mallampati: II  TM distance: >3 FB   Neck ROM: full  Mouth opening: > = 3 FB Dental: normal exam         Pulmonary:Negative Pulmonary ROS and normal exam                               Cardiovascular:    (+) hypertension: moderate,       ECG reviewed  Rhythm: regular  Rate: normal                    Neuro/Psych:                ROS comment: Pituitary adenoma GI/Hepatic/Renal:   (+) GERD: well controlled,           Endo/Other: Negative Endo/Other ROS             Pt had no PAT visit       Abdominal:           Vascular: negative vascular ROS.                                      Anesthesia Plan      MAC     ASA 3       Induction: intravenous. Anesthetic plan and risks discussed with patient. Use of blood products discussed with patient whom. Plan discussed with attending.     Attending anesthesiologist reviewed and agrees with Pre Eval content            IVIS Beanvides - CRNA   3/22/2021

## 2021-03-22 NOTE — OP NOTE
1 [facility]  PATIENT NAME:   Nirmal Holguin   MR #:  [id number]  YOB: 1955   ACCOUNT #:  [de-identified]  SURGEON:  Emy Donnelly M.D. ADMIT DATE:  3/22/2021  SERVICE:  Neurosurgery  DICTATED BY: Emy Donnelly M.D. SURGERY DATE:  3/22/2021           OPERATIVE REPORT       PREOPERATIVE DIAGNOSIS:     1. Pituitary adenoma (recurrent)     POSTOPERATIVE DIAGNOSIS:     1. Same    PROCEDURE(S) PERFORMED:    1. Placement of stereotactic head frame   2. Gamma Knife radiosurgery for recurrent pituitary adenoma    NEUROSURGEON:  Emy Donnelly M.D.      RADIATION ONCOLOGIST: Angel Fletcher MD    ANESTHESIA:  Moderate sedation    COMPLICATIONS:  None    INDICATIONS:   This is a 37-year-old woman with a non-secreting pituitary adenoma who underwent endoscopic transsphenoidal surgery in 2013 and 2020. There is tumor within the right cavernous sinus that is growing. We discussed various treatment options but ultimately recommended Gamma Knife radiosurgery. The patient was aware of the potential benefits in terms of tumor control and the possible risks including (but not limited to): pin site bleeding/swelling/infection, visual loss, hormone dysfunction, brain swelling, seizures, bleeding, new neurological symptoms, and/or radiation injury of the brain. PERFORMANCE STATUS:  100%    DETAILS OF PROCEDURE:  The four pin sites were cleaned with Chloraprep and injected with a mixture of Lidocaine 1%, Marcaine 0.5%, and sodium bicarbonate. The stereotactic head frame was secured with titanium screws. The patient underwent a stereotactic CT and MRI scans with contrast.  These images were sent over the network to the [address] [place] [address]. The targets and critical structures were contoured. An optimal treatment plan was developed by the neurosurgeon, radiation oncologist, and medical physicist.      A cone-beam CT scan was performed in the treatment position to confirm frame stability.   The patient then underwent Gamma Knife radiosurgery using the following parameters:    Target Size (cm) Shape Shots Dose (Gy) Isodose (%) Optic (Gy) Optic + 1 mm (Gy)   Pituitary tumor 2.17 Oval 18 16 50 6.6 7.3     The patient tolerated the procedure without difficulty and the stereotactic frame was removed uneventfully. The patient was instructed on pin site care and medications.     SPECIMENS REMOVED:  None    ESTIMATED BLOOD LOSS:  None    TOTAL TIME SPENT WITH PATIENT:  In conformance with CMS regulations, I affirm that I was present for the entire neurosurgical portion of the procedure including stereotactic frame placement, target definition, development of the treatment plan, treatment delivery, and stereotactic frame removal.     Devora Alejandro MD

## 2021-03-22 NOTE — ONCOLOGY
Gamma Knife Radiosurgery Procedure Note      Patient: Crow Solano  Date: 3/22/2021    Diagnosis:pituitary adenoma    Procedure: Gamma Knife Based Stereotactic Radiosurgery [facility])    Description of procedure:  Winona Dancer was met at the [facility] at 4:34 PM on 3/22/2021 by Dr. Gertrude Walker and myself. [Juan Calix]    Conscious sedation and frame placement were completed by Dr. Gertrude Walker. A stereotactic MRI brain with double-dose contrast was then completed. Dosimetry of SRS is summarized as follows:   Pituitary adenoma/R cavernous sinus: 16 Gy. See Media Tab for details. Dosimetry was reviewed by \"Dr. Rachna Cifuentes MS (special physics consult requested) and myself. Treatment was then given successfully. The frame was removed without complication. The patient was discharged home in stable condition. She was placed on a steroid taper. She will followup with me in 6 mo w repeat imaging.       Karyna Lowry MD

## 2021-03-22 NOTE — H&P
There have been no changes in the patient's condition since the history and physical.    All pituitary hormones are within normal limits. Travis Martinez MD

## 2021-03-23 ENCOUNTER — POST-OP TELEPHONE (OUTPATIENT)
Dept: RADIATION ONCOLOGY | Age: 66
End: 2021-03-23

## 2021-03-23 NOTE — PROGRESS NOTES
Spoke to patient POD #1 from [facility] radiosurgery. Pt denies that she has had bleeding, swelling, headache, or fever. Reinforced all post-procedure instructions. Reviewed with patient that the 2 week follow-up phone call with Jose Haji will be completed on 4/5/21. Further follow up will be with Dr. Marnie Trinh office in 6 months. Patient verbalized understanding to call with any new neurological symptoms. Encouraged to call with any questions or concerns.      Darcie De La Rosa RN

## 2021-03-24 LAB — ADRENOCORTICOTROPIC HORMONE: 14 PG/ML (ref 6–58)

## 2021-04-05 ENCOUNTER — HOSPITAL ENCOUNTER (OUTPATIENT)
Dept: RADIATION ONCOLOGY | Age: 66
Discharge: HOME | End: 2021-04-05
Payer: COMMERCIAL

## 2021-04-05 NOTE — PROGRESS NOTES
Two week follow-up phone call with patient has been completed. 1. Pt denies any issues with the pin sites, states that the sutures have almost all dissolved. 2.   Has been reminded of their next appointment with Dr. Tommy Chirinos to be scheduled in 6 months  3. Any other questions or further follow up will be with Dr. Tommy Chirinos.    Thank you for referring this patient to the [facility] for treatment, it has been a pleasure to participate in their care. If the patient requires future Gamma Knife procedures please contact the department directly at [phone].

## 2021-05-01 ENCOUNTER — HOSPITAL ENCOUNTER (OUTPATIENT)
Dept: WOMENS IMAGING | Age: 66
Discharge: HOME | End: 2021-05-01
Payer: COMMERCIAL

## 2021-05-01 VITALS — BODY MASS INDEX: 30.13 KG/M2 | HEIGHT: 67 IN | WEIGHT: 192 LBS

## 2021-05-01 DIAGNOSIS — Z12.39 BREAST CANCER SCREENING: ICD-10-CM

## 2021-05-01 PROCEDURE — 77067 SCR MAMMO BI INCL CAD: CPT

## 2021-05-31 NOTE — PROGRESS NOTES
Physical Therapy    Facility/Department: [facility] ICU  Initial Assessment and Treatment/Discharge    NAME: Araseli Madison  : 1955  MRN: [id number]    Date of Service: 2020    Discharge Recommendations:Vanessa Kimball scored a  on the AM-PAC short mobility form. At this time, no further PT is recommended upon discharge due to pt supervision/independent. Recommend patient returns to prior setting with prior services. PT Equipment Recommendations  Equipment Needed: No    Assessment   Assessment: Pt demonstrating safe mobility with supervision/independence. Safe for d/c home. Plans to return home with family assisting prn. No further PT needs identified. Decision Making: Low Complexity  PT Education: Goals;PT Role;Plan of Care;General Safety; Functional Mobility Training;Precautions  Patient Education: Pt verbalized understanding  REQUIRES PT FOLLOW UP: No       Patient Diagnosis(es): The encounter diagnosis was Pituitary adenoma ([facility] [address] has a past medical history of Allergic rhinitis, Chicken pox, GERD (gastroesophageal reflux disease), Hypertension, Kidney stone, Pituitary adenoma ([facility] [address] and Pituitary microadenoma ([facility] [address] has a past surgical history that includes Hysterectomy; carlos and bso (cervix removed); pituitary surgery (); cyst removal (Left, 13); Nasal sinus surgery (N/A, 2020); and craniotomy (N/A, 2020). Restrictions  Position Activity Restriction  Other position/activity restrictions: up as tolerated ,ambulate TRANSSPHENOIDAL PRECAUTIONS:  No straws, COUGH/SNEEZE WITH MOUTH OPEN  Vision/Hearing  Vision: Within Functional Limits  Hearing: Within functional limits     Subjective  General  Chart Reviewed: Yes  Additional Pertinent Hx: Pt is a 60 y.o. female adm  with pituitary adenoma.   Pt s/p NEUROENDOSCOPY WITH REMOVAL OF PITUITARY TUMOR TRANSPHENOIDAL, STEREOTACTIC COMPUTER ASSISTED SURGERY, REDO ENDOSCOPIC ENDONASAL TRANSPHENOIDAL RESECTION OF PITUITARY TUMOR appt made for Thursday 08/22/19 @ 9:30 am   term goals: No goals set - pt supervision/independent       Therapy Time   Individual Concurrent Group Co-treatment   Time In 0839         Time Out 0904         Minutes 25              Timed Code Treatment Minutes: 10      Total Treatment Minutes:  Jose Kearney PT

## 2021-09-08 ENCOUNTER — HOSPITAL ENCOUNTER (OUTPATIENT)
Dept: MRI IMAGING | Age: 66
Discharge: HOME | End: 2021-09-08
Payer: COMMERCIAL

## 2021-09-08 DIAGNOSIS — D35.2 PITUITARY ADENOMA (HCC): ICD-10-CM

## 2021-09-08 PROCEDURE — 70553 MRI BRAIN STEM W/O & W/DYE: CPT

## 2021-09-08 PROCEDURE — 6360000004 HC RX CONTRAST MEDICATION: Performed by: NEUROLOGICAL SURGERY

## 2021-09-08 PROCEDURE — 82565 ASSAY OF CREATININE: CPT

## 2021-09-08 PROCEDURE — A9579 GAD-BASE MR CONTRAST NOS,1ML: HCPCS | Performed by: NEUROLOGICAL SURGERY

## 2021-09-08 RX ADMIN — GADOTERIDOL 19 ML: 279.3 INJECTION, SOLUTION INTRAVENOUS at 14:57

## 2021-10-01 ENCOUNTER — TELEPHONE (OUTPATIENT)
Dept: INTERNAL MEDICINE CLINIC | Age: 66
End: 2021-10-01

## 2021-10-01 NOTE — TELEPHONE ENCOUNTER
Recommend supportive care and/or VV for symptoms if worsening  Supportive care :  Humidified air  Honey lemon tea  Nasal rinse prn  Flonase daily  NSAIDs/ tylenol for pain and fever  Mucinex prn for congestion   Navin Mejia

## 2021-10-01 NOTE — TELEPHONE ENCOUNTER
----- Message from Watson Jamesluz elena sent at 10/1/2021 11:51 AM EDT -----  Subject: Message to Provider    QUESTIONS  Information for Provider? Dr. Lambert Feliz, Pt called in inquiring if something   can be prescribed for her allergies, Pt is having drainage and constant   throat clearing. Nasal spray is not effective at this time. Please advise  ---------------------------------------------------------------------------  --------------  CALL BACK INFO  What is the best way for the office to contact you? OK to leave message on   voicemail  Preferred Call Back Phone Number? [phone]  ---------------------------------------------------------------------------  --------------  SCRIPT ANSWERS  Relationship to Patient?  Self

## 2022-06-28 ENCOUNTER — TELEPHONE (OUTPATIENT)
Dept: INTERNAL MEDICINE CLINIC | Age: 67
End: 2022-06-28

## 2022-06-28 NOTE — TELEPHONE ENCOUNTER
----- Message from Angélica Epperson sent at 6/28/2022 10:59 AM EDT -----  Subject: Message to Provider    QUESTIONS  Information for Provider? Pt would like to request a prescription, uti   would like a call back, Send prescription to [facility] on [facility].   ---------------------------------------------------------------------------  --------------  [address]  What is the best way for the office to contact you? OK to leave message on   voicemail  Preferred Call Back Phone Number? [phone]  ---------------------------------------------------------------------------  --------------  SCRIPT ANSWERS  Relationship to Patient?  Self Patient attended Phase 2 Cardiac Rehab Exercise Session. Further documentation will be completed in Cardiac Science/[employer] and will be scanned into the medical record upon discharge.

## 2022-06-29 ENCOUNTER — OFFICE VISIT (OUTPATIENT)
Dept: INTERNAL MEDICINE CLINIC | Age: 67
End: 2022-06-29
Payer: COMMERCIAL

## 2022-06-29 VITALS
HEIGHT: 67 IN | DIASTOLIC BLOOD PRESSURE: 94 MMHG | HEART RATE: 78 BPM | SYSTOLIC BLOOD PRESSURE: 172 MMHG | WEIGHT: 183 LBS | BODY MASS INDEX: 28.72 KG/M2

## 2022-06-29 DIAGNOSIS — R30.0 DYSURIA: Primary | ICD-10-CM

## 2022-06-29 DIAGNOSIS — N30.00 ACUTE CYSTITIS WITHOUT HEMATURIA: ICD-10-CM

## 2022-06-29 DIAGNOSIS — I10 ESSENTIAL HYPERTENSION: ICD-10-CM

## 2022-06-29 LAB
BILIRUBIN, POC: NORMAL
BLOOD URINE, POC: NORMAL
CLARITY, POC: NORMAL
COLOR, POC: NORMAL
GLUCOSE URINE, POC: NORMAL
KETONES, POC: NORMAL
LEUKOCYTE EST, POC: NORMAL
NITRITE, POC: NORMAL
PH, POC: 6.5
PROTEIN, POC: NORMAL
SPECIFIC GRAVITY, POC: 1.02
UROBILINOGEN, POC: 0.2

## 2022-06-29 PROCEDURE — 99213 OFFICE O/P EST LOW 20 MIN: CPT | Performed by: NURSE PRACTITIONER

## 2022-06-29 PROCEDURE — 81002 URINALYSIS NONAUTO W/O SCOPE: CPT | Performed by: NURSE PRACTITIONER

## 2022-06-29 RX ORDER — CIPROFLOXACIN 250 MG/1
250 TABLET, FILM COATED ORAL 2 TIMES DAILY
Qty: 6 TABLET | Refills: 0 | Status: SHIPPED | OUTPATIENT
Start: 2022-06-29 | End: 2022-07-02

## 2022-06-29 RX ORDER — LOSARTAN POTASSIUM 50 MG/1
50 TABLET ORAL DAILY
Qty: 30 TABLET | Refills: 0 | Status: SHIPPED | OUTPATIENT
Start: 2022-06-29 | End: 2022-07-13 | Stop reason: SDUPTHER

## 2022-06-29 ASSESSMENT — ENCOUNTER SYMPTOMS
SHORTNESS OF BREATH: 0
WHEEZING: 0
CHEST TIGHTNESS: 0
COUGH: 0

## 2022-06-29 ASSESSMENT — PATIENT HEALTH QUESTIONNAIRE - PHQ9
1. LITTLE INTEREST OR PLEASURE IN DOING THINGS: 0
SUM OF ALL RESPONSES TO PHQ QUESTIONS 1-9: 0
2. FEELING DOWN, DEPRESSED OR HOPELESS: 0
SUM OF ALL RESPONSES TO PHQ9 QUESTIONS 1 & 2: 0
SUM OF ALL RESPONSES TO PHQ QUESTIONS 1-9: 0

## 2022-06-29 NOTE — PROGRESS NOTES
6/29/22     Chief Complaint   Patient presents with    Urinary Frequency     with odorous urine and burning with urination     HPI     Here for dysuria, frequency, urgency, odor that started about 1 week ago. Symptoms are worsening. Denies fever, chills, flank pain. Pt has increased water intake without any relief. HTN - has not been on medication recently   Was controlled at home until recently, has noticed it has been high (170s/?). She reports she has had increased stress the past few weeks and feels this is playing a role. Allergies   Allergen Reactions    Metoprolol Nausea Only    Macrobid [Nitrofurantoin] Nausea And Vomiting     Current Outpatient Medications   Medication Sig Dispense Refill    losartan (COZAAR) 50 MG tablet Take 1 tablet by mouth daily 30 tablet 0    ciprofloxacin (CIPRO) 250 MG tablet Take 1 tablet by mouth 2 times daily for 3 days 6 tablet 0     No current facility-administered medications for this visit. Review of Systems   Constitutional: Negative for chills, fatigue and fever. Respiratory: Negative for cough, chest tightness, shortness of breath and wheezing. Cardiovascular: Negative for chest pain, palpitations and leg swelling. Genitourinary: Positive for dysuria (+ odor), frequency and urgency. Negative for flank pain, hematuria and pelvic pain. Neurological: Negative for dizziness, tremors, light-headedness and headaches. Vitals:    06/29/22 1121 06/29/22 1146   BP: (!) 174/94 (!) 172/94   Pulse: 78    Weight: 183 lb (83 kg)    Height: 5' 7\" (1.702 m)       Physical Exam  Constitutional:       General: She is not in acute distress. Appearance: Normal appearance. She is not ill-appearing. HENT:      Head: Normocephalic and atraumatic. Cardiovascular:      Rate and Rhythm: Normal rate and regular rhythm. Pulmonary:      Effort: Pulmonary effort is normal. No respiratory distress. Breath sounds: Normal breath sounds.    Skin:     General: Skin is warm and dry. Neurological:      Mental Status: She is alert and oriented to person, place, and time. Mental status is at baseline. Psychiatric:         Mood and Affect: Mood normal.         Behavior: Behavior normal.       Assessment/Plan:  Dysuria/ Acute cystitis without hematuria  Uncontrolled and worsening   Sending urine for culture  Covering with abx - take as prescribed   Work on increasing water intake   Avoid bladder irritants   - POCT Urinalysis no Micro  - ciprofloxacin (CIPRO) 250 MG tablet; Take 1 tablet by mouth 2 times daily for 3 days  Dispense: 6 tablet; Refill: 0  - Culture, Urine    Essential hypertension  Chronic, uncontrolled  Asymptomatic  Restarting medication - trying alternate due to nausea with previous medications. - losartan (COZAAR) 50 MG tablet; Take 1 tablet by mouth daily  Dispense: 30 tablet; Refill: 0  Watch sodium intake, decrease caffeine   Work on healthy diet/ exercise      Discussed medications with patient, who voiced understanding of their use and indications. All questions answered.     FU in 2 weeks for BP check and fasting labs     Electronically signed by IVIS Ortiz CNP on 6/29/2022 at 11:47 AM

## 2022-07-01 LAB
ORGANISM: ABNORMAL
URINE CULTURE, ROUTINE: ABNORMAL

## 2022-07-13 ENCOUNTER — OFFICE VISIT (OUTPATIENT)
Dept: INTERNAL MEDICINE CLINIC | Age: 67
End: 2022-07-13
Payer: COMMERCIAL

## 2022-07-13 VITALS
OXYGEN SATURATION: 98 % | DIASTOLIC BLOOD PRESSURE: 88 MMHG | HEART RATE: 77 BPM | BODY MASS INDEX: 28.51 KG/M2 | WEIGHT: 182 LBS | SYSTOLIC BLOOD PRESSURE: 138 MMHG

## 2022-07-13 DIAGNOSIS — E78.2 MIXED HYPERLIPIDEMIA: ICD-10-CM

## 2022-07-13 DIAGNOSIS — E55.9 VITAMIN D DEFICIENCY: ICD-10-CM

## 2022-07-13 DIAGNOSIS — I10 ESSENTIAL HYPERTENSION: ICD-10-CM

## 2022-07-13 DIAGNOSIS — R73.09 ELEVATED GLUCOSE: ICD-10-CM

## 2022-07-13 DIAGNOSIS — I10 ESSENTIAL HYPERTENSION: Primary | ICD-10-CM

## 2022-07-13 LAB
ALBUMIN SERPL-MCNC: 4.7 G/DL (ref 3.4–5)
ANION GAP SERPL CALCULATED.3IONS-SCNC: 12 MMOL/L (ref 3–16)
BUN BLDV-MCNC: 7 MG/DL (ref 7–20)
CALCIUM SERPL-MCNC: 10 MG/DL (ref 8.3–10.6)
CHLORIDE BLD-SCNC: 104 MMOL/L (ref 99–110)
CHOLESTEROL, TOTAL: 253 MG/DL (ref 0–199)
CO2: 24 MMOL/L (ref 21–32)
CREAT SERPL-MCNC: 0.8 MG/DL (ref 0.6–1.2)
GFR AFRICAN AMERICAN: >60
GFR NON-AFRICAN AMERICAN: >60
GLUCOSE BLD-MCNC: 105 MG/DL (ref 70–99)
HDLC SERPL-MCNC: 58 MG/DL (ref 40–60)
LDL CHOLESTEROL CALCULATED: 168 MG/DL
PHOSPHORUS: 3.1 MG/DL (ref 2.5–4.9)
POTASSIUM SERPL-SCNC: 4.5 MMOL/L (ref 3.5–5.1)
SODIUM BLD-SCNC: 140 MMOL/L (ref 136–145)
TRIGL SERPL-MCNC: 136 MG/DL (ref 0–150)
VLDLC SERPL CALC-MCNC: 27 MG/DL

## 2022-07-13 PROCEDURE — 99212 OFFICE O/P EST SF 10 MIN: CPT | Performed by: NURSE PRACTITIONER

## 2022-07-13 RX ORDER — LOSARTAN POTASSIUM 50 MG/1
50 TABLET ORAL DAILY
Qty: 90 TABLET | Refills: 1 | Status: SHIPPED | OUTPATIENT
Start: 2022-07-13

## 2022-07-13 SDOH — ECONOMIC STABILITY: FOOD INSECURITY: WITHIN THE PAST 12 MONTHS, THE FOOD YOU BOUGHT JUST DIDN'T LAST AND YOU DIDN'T HAVE MONEY TO GET MORE.: NEVER TRUE

## 2022-07-13 SDOH — ECONOMIC STABILITY: FOOD INSECURITY: WITHIN THE PAST 12 MONTHS, YOU WORRIED THAT YOUR FOOD WOULD RUN OUT BEFORE YOU GOT MONEY TO BUY MORE.: NEVER TRUE

## 2022-07-13 ASSESSMENT — SOCIAL DETERMINANTS OF HEALTH (SDOH): HOW HARD IS IT FOR YOU TO PAY FOR THE VERY BASICS LIKE FOOD, HOUSING, MEDICAL CARE, AND HEATING?: NOT HARD AT ALL

## 2022-07-13 ASSESSMENT — ENCOUNTER SYMPTOMS
COUGH: 0
CHEST TIGHTNESS: 0
WHEEZING: 0
SHORTNESS OF BREATH: 0

## 2022-07-13 NOTE — PROGRESS NOTES
7/13/22     Chief Complaint   Patient presents with    Hypertension     2 week check      HPI     Here for BP follow up -  noted elevated BP at home and in the office a few weeks ago. She was not taking medication at the time. She has a history of nausea with medications. Started on losartan 50 mg and reports doing well on medication without s/e. Home BP is improving since starting medication, still with some intermittent elevations but reports she was not using the cuff correctly at first. Denies any concerns today     Allergies   Allergen Reactions    Metoprolol Nausea Only    Macrobid [Nitrofurantoin] Nausea And Vomiting     Current Outpatient Medications   Medication Sig Dispense Refill    losartan (COZAAR) 50 MG tablet Take 1 tablet by mouth daily 30 tablet 0     No current facility-administered medications for this visit. Review of Systems   Constitutional: Negative for chills, fatigue and fever. Respiratory: Negative for cough, chest tightness, shortness of breath and wheezing. Cardiovascular: Negative for chest pain, palpitations and leg swelling. Neurological: Negative for dizziness, tremors, light-headedness and headaches. Vitals:    07/13/22 1119   BP: 138/88   Pulse: 77   SpO2: 98%   Weight: 182 lb (82.6 kg)      Physical Exam  Vitals reviewed. Constitutional:       General: She is not in acute distress. Appearance: She is well-developed. She is not ill-appearing or diaphoretic. HENT:      Head: Normocephalic and atraumatic. Cardiovascular:      Rate and Rhythm: Normal rate and regular rhythm. Heart sounds: Normal heart sounds. Pulmonary:      Effort: Pulmonary effort is normal. No respiratory distress. Breath sounds: Normal breath sounds. Skin:     General: Skin is warm and dry. Neurological:      General: No focal deficit present. Mental Status: She is alert and oriented to person, place, and time.    Psychiatric:         Mood and Affect: Mood and affect normal.         Behavior: Behavior normal.       Assessment/Plan:  Essential hypertension  Chronic, stable, controlled. Asymptomatic  Work on healthy diet/ exercise   Continue losartan 50 mg daily   - Renal Function Panel; Future    Mixed hyperlipidemia  Chronic, due for BW   - Lipid Panel; Future    Vitamin D deficiency  Chronic, due for BW   - Vitamin D 25 Hydroxy; Future    Elevated glucose  Chronic, due for BW   - Hemoglobin A1C; Future    Discussed medications with patient, who voiced understanding of their use and indications. All questions answered. Return in about 4 months (around 11/13/2022) for htn.       Electronically signed by IVIS Valentine CNP on 7/13/2022 at 11:39 AM

## 2022-07-14 DIAGNOSIS — E55.9 VITAMIN D DEFICIENCY: ICD-10-CM

## 2022-07-14 LAB
ESTIMATED AVERAGE GLUCOSE: 128.4 MG/DL
HBA1C MFR BLD: 6.1 %
VITAMIN D 25-HYDROXY: 6 NG/ML

## 2022-07-14 RX ORDER — ATORVASTATIN CALCIUM 20 MG/1
20 TABLET, FILM COATED ORAL DAILY
Qty: 90 TABLET | Refills: 1 | Status: SHIPPED | OUTPATIENT
Start: 2022-07-14

## 2022-07-14 RX ORDER — ERGOCALCIFEROL (VITAMIN D2) 1250 MCG
50000 CAPSULE ORAL WEEKLY
Qty: 16 CAPSULE | Refills: 0 | Status: SHIPPED | OUTPATIENT
Start: 2022-07-14 | End: 2022-11-11

## 2022-07-21 ENCOUNTER — HOSPITAL ENCOUNTER (OUTPATIENT)
Dept: WOMENS IMAGING | Age: 67
Discharge: HOME | End: 2022-07-21
Payer: COMMERCIAL

## 2022-07-21 VITALS — HEIGHT: 67 IN | BODY MASS INDEX: 28.56 KG/M2 | WEIGHT: 182 LBS

## 2022-07-21 DIAGNOSIS — Z12.31 BREAST CANCER SCREENING BY MAMMOGRAM: ICD-10-CM

## 2022-07-21 PROCEDURE — 77067 SCR MAMMO BI INCL CAD: CPT

## 2022-07-26 DIAGNOSIS — I10 ESSENTIAL HYPERTENSION: ICD-10-CM

## 2022-07-26 RX ORDER — LOSARTAN POTASSIUM 50 MG/1
50 TABLET ORAL DAILY
Qty: 30 TABLET | OUTPATIENT
Start: 2022-07-26

## 2022-08-26 ENCOUNTER — HOSPITAL ENCOUNTER (OUTPATIENT)
Dept: WOMENS IMAGING | Age: 67
Discharge: HOME | End: 2022-08-26
Payer: COMMERCIAL

## 2022-08-26 ENCOUNTER — HOSPITAL ENCOUNTER (OUTPATIENT)
Dept: ULTRASOUND IMAGING | Age: 67
Discharge: HOME | End: 2022-08-26
Payer: COMMERCIAL

## 2022-08-26 DIAGNOSIS — N63.10 BREAST MASS, RIGHT: ICD-10-CM

## 2022-08-26 DIAGNOSIS — R92.8 ABNORMAL MAMMOGRAM: ICD-10-CM

## 2022-08-26 DIAGNOSIS — R92.8 ABNORMAL MAMMOGRAM: Primary | ICD-10-CM

## 2022-08-26 PROCEDURE — 76642 ULTRASOUND BREAST LIMITED: CPT

## 2022-08-26 PROCEDURE — G0279 TOMOSYNTHESIS, MAMMO: HCPCS

## 2022-10-01 ENCOUNTER — HOSPITAL ENCOUNTER (OUTPATIENT)
Dept: MRI IMAGING | Age: 67
Discharge: HOME | End: 2022-10-01
Payer: COMMERCIAL

## 2022-10-01 DIAGNOSIS — D35.2 ADENOMA OF PITUITARY (HCC): ICD-10-CM

## 2022-10-01 PROCEDURE — 6360000004 HC RX CONTRAST MEDICATION: Performed by: PHYSICIAN ASSISTANT

## 2022-10-01 PROCEDURE — 70553 MRI BRAIN STEM W/O & W/DYE: CPT

## 2022-10-01 PROCEDURE — A9576 INJ PROHANCE MULTIPACK: HCPCS | Performed by: PHYSICIAN ASSISTANT

## 2022-10-01 RX ADMIN — GADOTERIDOL 17 ML: 279.3 INJECTION, SOLUTION INTRAVENOUS at 14:11

## 2022-11-21 ENCOUNTER — OFFICE VISIT (OUTPATIENT)
Dept: INTERNAL MEDICINE CLINIC | Age: 67
End: 2022-11-21
Payer: COMMERCIAL

## 2022-11-21 VITALS
DIASTOLIC BLOOD PRESSURE: 86 MMHG | OXYGEN SATURATION: 99 % | BODY MASS INDEX: 28.51 KG/M2 | SYSTOLIC BLOOD PRESSURE: 148 MMHG | WEIGHT: 182 LBS | HEART RATE: 82 BPM

## 2022-11-21 DIAGNOSIS — H00.012 HORDEOLUM EXTERNUM OF RIGHT LOWER EYELID: ICD-10-CM

## 2022-11-21 DIAGNOSIS — M54.50 ACUTE RIGHT-SIDED LOW BACK PAIN WITHOUT SCIATICA: ICD-10-CM

## 2022-11-21 DIAGNOSIS — I10 ESSENTIAL HYPERTENSION: Primary | ICD-10-CM

## 2022-11-21 DIAGNOSIS — R82.90 ABNORMAL URINE ODOR: ICD-10-CM

## 2022-11-21 DIAGNOSIS — E78.2 MIXED HYPERLIPIDEMIA: ICD-10-CM

## 2022-11-21 DIAGNOSIS — Z12.11 COLON CANCER SCREENING: ICD-10-CM

## 2022-11-21 LAB
BILIRUBIN, POC: NORMAL
BLOOD URINE, POC: NORMAL
CLARITY, POC: CLEAR
COLOR, POC: YELLOW
GLUCOSE URINE, POC: NORMAL
KETONES, POC: NORMAL
LEUKOCYTE EST, POC: NORMAL
NITRITE, POC: NORMAL
PH, POC: 7
PROTEIN, POC: NORMAL
SPECIFIC GRAVITY, POC: 1.02
UROBILINOGEN, POC: 0.2

## 2022-11-21 PROCEDURE — 3074F SYST BP LT 130 MM HG: CPT | Performed by: NURSE PRACTITIONER

## 2022-11-21 PROCEDURE — 99214 OFFICE O/P EST MOD 30 MIN: CPT | Performed by: NURSE PRACTITIONER

## 2022-11-21 PROCEDURE — 3078F DIAST BP <80 MM HG: CPT | Performed by: NURSE PRACTITIONER

## 2022-11-21 PROCEDURE — 81002 URINALYSIS NONAUTO W/O SCOPE: CPT | Performed by: NURSE PRACTITIONER

## 2022-11-21 RX ORDER — LOSARTAN POTASSIUM 50 MG/1
50 TABLET ORAL DAILY
Qty: 90 TABLET | Refills: 1 | Status: SHIPPED | OUTPATIENT
Start: 2022-11-21

## 2022-11-21 RX ORDER — ATORVASTATIN CALCIUM 20 MG/1
20 TABLET, FILM COATED ORAL DAILY
Qty: 90 TABLET | Refills: 1 | Status: SHIPPED | OUTPATIENT
Start: 2022-11-21

## 2022-11-21 ASSESSMENT — ENCOUNTER SYMPTOMS
CHEST TIGHTNESS: 0
SHORTNESS OF BREATH: 0
WHEEZING: 0
COUGH: 0

## 2022-11-21 NOTE — PROGRESS NOTES
11/21/22     Chief Complaint   Patient presents with    Hypertension     4 month f/u - no refills needed - takes BP meds at noon    Other     No colonoscopy done before     HPI    Here for BP follow up, reports home BP has been better controlled  Running 130-140/80s at home. Denies any CP, palpitations, SOB, dizziness. She is not taking the statin regularly. She is taking her vitamin D supplement. Small stye right eye has been waxing and waning the past few weeks. Denies any redness, pain or swelling. A few weeks ago was helping someone move and has been having right sided back pain since. Getting a little better since onset. Denies numbness or tingling. Taking ibuprofen prn for pain. Feels better during the day when moving around. Also noted a foul odor to her urine the past week but denies dysuria, frequency, urgency or hematuria. Allergies   Allergen Reactions    Metoprolol Nausea Only    Macrobid [Nitrofurantoin] Nausea And Vomiting       Current Outpatient Medications   Medication Sig Dispense Refill    atorvastatin (LIPITOR) 20 MG tablet Take 1 tablet by mouth daily 90 tablet 1    losartan (COZAAR) 50 MG tablet Take 1 tablet by mouth daily 90 tablet 1    ergocalciferol (ERGOCALCIFEROL) 1.25 MG (03295 UT) capsule Take 1 capsule by mouth once a week Weekly for four months 16 capsule 0     No current facility-administered medications for this visit. Review of Systems   Constitutional:  Negative for chills, fatigue and fever. Respiratory:  Negative for cough, chest tightness, shortness of breath and wheezing. Cardiovascular:  Negative for chest pain, palpitations and leg swelling. Neurological:  Negative for dizziness, tremors, light-headedness and headaches.      Vitals:    11/21/22 0740 11/21/22 0741 11/21/22 0805   BP: (!) 164/92 (!) 158/90 (!) 148/86   Pulse: 82     SpO2: 99%     Weight: 182 lb (82.6 kg)        Physical Exam  Constitutional:       General: She is not in acute distress. Appearance: Normal appearance. She is not ill-appearing. HENT:      Head: Normocephalic and atraumatic. Cardiovascular:      Rate and Rhythm: Normal rate and regular rhythm. Heart sounds: Normal heart sounds. Pulmonary:      Effort: Pulmonary effort is normal. No respiratory distress. Breath sounds: Normal breath sounds. Neurological:      Mental Status: She is alert and oriented to person, place, and time. Mental status is at baseline. Psychiatric:         Mood and Affect: Mood normal.         Behavior: Behavior normal.     Assessment/Plan:  1. Essential hypertension  Chronic, elevated today but improved some with recheck. Better controlled at home, some white coat syndrome. Continue losartan daily. Monitor BP at home and return if not at goal.   - losartan (COZAAR) 50 MG tablet; Take 1 tablet by mouth daily  Dispense: 90 tablet; Refill: 1    2. Hordeolum externum of right lower eyelid  Uncontrolled. Waxing and waning. No signs of infection. Reviewed supportive care and criteria for follow up     3. Acute right-sided low back pain without sciatica  Uncontrolled. Improving. Reviewed supportive care with rest, ice, heat, okay for prn nsaids, trial of lidocaine, home exercise provided. Return if not improving or worsening.   - [facility]    4. Abnormal urine odor  Uncontrolled. Intermittent. Without other red flag symptoms. Sending for culture. Will treat if culture is positive. Increase water intake. - POCT Urinalysis no Micro    5. Mixed hyperlipidemia  Chronic, uncontrolled. Start lipitor as previously prescribed. - atorvastatin (LIPITOR) 20 MG tablet; Take 1 tablet by mouth daily  Dispense: 90 tablet; Refill: 1    6. Colon cancer screening  - Cologorin (Fecal DNA Colorectal Cancer Screening)    Discussed medications with patient, who voiced understanding of their use and indications. All questions answered.     Return in about 4 months (around 3/21/2023), or if symptoms worsen or fail to improve, for HTN, HLD, vit D def .       Electronically signed by IVIS Harper CNP on 11/21/2022 at 8:41 AM

## 2022-11-24 LAB
ORGANISM: ABNORMAL
URINE CULTURE, ROUTINE: ABNORMAL

## 2023-02-23 ENCOUNTER — HOSPITAL ENCOUNTER (OUTPATIENT)
Dept: ULTRASOUND IMAGING | Age: 68
Discharge: HOME | End: 2023-02-23
Payer: COMMERCIAL

## 2023-02-23 ENCOUNTER — HOSPITAL ENCOUNTER (OUTPATIENT)
Dept: WOMENS IMAGING | Age: 68
Discharge: HOME | End: 2023-02-23
Payer: COMMERCIAL

## 2023-02-23 VITALS — BODY MASS INDEX: 28.25 KG/M2 | HEIGHT: 67 IN | WEIGHT: 180 LBS

## 2023-02-23 DIAGNOSIS — R92.8 ABNORMAL MAMMOGRAM: ICD-10-CM

## 2023-02-23 PROCEDURE — 76642 ULTRASOUND BREAST LIMITED: CPT

## 2023-02-23 PROCEDURE — G0279 TOMOSYNTHESIS, MAMMO: HCPCS

## 2023-07-12 ENCOUNTER — TELEPHONE (OUTPATIENT)
Dept: INTERNAL MEDICINE CLINIC | Age: 68
End: 2023-07-12

## 2023-07-12 NOTE — TELEPHONE ENCOUNTER
----- Message from Nedajeremymadiha Nunn sent at 7/12/2023 10:09 AM EDT -----  Subject: Appointment Request    Reason for Call: Established Patient Appointment needed: Immediate Adult   Urinary Problem    QUESTIONS    Reason for appointment request? Available appointments did not meet   patient need     Additional Information for Provider? Pt would like to know if she can be   get a prescription called in for a UTI;  Or if she can come in to get a U/A   done without an appt?; Pt would like to be seen beginning of next week;   please advise pt  ---------------------------------------------------------------------------  --------------  Shelby WILSON  [phone]; OK to leave message on voicemail  ---------------------------------------------------------------------------  --------------  SCRIPT ANSWERS

## 2023-07-13 ENCOUNTER — OFFICE VISIT (OUTPATIENT)
Dept: INTERNAL MEDICINE CLINIC | Age: 68
End: 2023-07-13
Payer: MEDICARE

## 2023-07-13 VITALS
WEIGHT: 181.2 LBS | HEART RATE: 68 BPM | DIASTOLIC BLOOD PRESSURE: 92 MMHG | HEIGHT: 67 IN | SYSTOLIC BLOOD PRESSURE: 138 MMHG | OXYGEN SATURATION: 98 % | BODY MASS INDEX: 28.44 KG/M2

## 2023-07-13 DIAGNOSIS — E78.2 MIXED HYPERLIPIDEMIA: ICD-10-CM

## 2023-07-13 DIAGNOSIS — R30.0 DYSURIA: ICD-10-CM

## 2023-07-13 DIAGNOSIS — I10 ESSENTIAL HYPERTENSION: ICD-10-CM

## 2023-07-13 DIAGNOSIS — Z00.00 MEDICARE ANNUAL WELLNESS VISIT, INITIAL: Primary | ICD-10-CM

## 2023-07-13 DIAGNOSIS — E55.9 VITAMIN D DEFICIENCY: ICD-10-CM

## 2023-07-13 DIAGNOSIS — R73.09 ELEVATED GLUCOSE: ICD-10-CM

## 2023-07-13 LAB
25(OH)D3 SERPL-MCNC: 9.7 NG/ML
ANION GAP SERPL CALCULATED.3IONS-SCNC: 9 MMOL/L (ref 3–16)
BILIRUBIN, POC: ABNORMAL
BLOOD URINE, POC: ABNORMAL
BUN SERPL-MCNC: 10 MG/DL (ref 7–20)
CALCIUM SERPL-MCNC: 10 MG/DL (ref 8.3–10.6)
CHLORIDE SERPL-SCNC: 106 MMOL/L (ref 99–110)
CHOLEST SERPL-MCNC: 237 MG/DL (ref 0–199)
CLARITY, POC: ABNORMAL
CO2 SERPL-SCNC: 26 MMOL/L (ref 21–32)
COLOR, POC: YELLOW
CREAT SERPL-MCNC: 0.9 MG/DL (ref 0.6–1.2)
GFR SERPLBLD CREATININE-BSD FMLA CKD-EPI: >60 ML/MIN/{1.73_M2}
GLUCOSE SERPL-MCNC: 102 MG/DL (ref 70–99)
GLUCOSE URINE, POC: ABNORMAL
HDLC SERPL-MCNC: 63 MG/DL (ref 40–60)
KETONES, POC: ABNORMAL
LDLC SERPL CALC-MCNC: 154 MG/DL
LEUKOCYTE EST, POC: ABNORMAL
NITRITE, POC: ABNORMAL
PH, POC: 5.5
POTASSIUM SERPL-SCNC: 4 MMOL/L (ref 3.5–5.1)
PROTEIN, POC: 30
SODIUM SERPL-SCNC: 141 MMOL/L (ref 136–145)
SPECIFIC GRAVITY, POC: 1.02
TRIGL SERPL-MCNC: 102 MG/DL (ref 0–150)
UROBILINOGEN, POC: 0.2
VLDLC SERPL CALC-MCNC: 20 MG/DL

## 2023-07-13 PROCEDURE — 3080F DIAST BP >= 90 MM HG: CPT | Performed by: NURSE PRACTITIONER

## 2023-07-13 PROCEDURE — G8427 DOCREV CUR MEDS BY ELIG CLIN: HCPCS | Performed by: NURSE PRACTITIONER

## 2023-07-13 PROCEDURE — 99213 OFFICE O/P EST LOW 20 MIN: CPT | Performed by: NURSE PRACTITIONER

## 2023-07-13 PROCEDURE — G8419 CALC BMI OUT NRM PARAM NOF/U: HCPCS | Performed by: NURSE PRACTITIONER

## 2023-07-13 PROCEDURE — 3017F COLORECTAL CA SCREEN DOC REV: CPT | Performed by: NURSE PRACTITIONER

## 2023-07-13 PROCEDURE — 81002 URINALYSIS NONAUTO W/O SCOPE: CPT | Performed by: NURSE PRACTITIONER

## 2023-07-13 PROCEDURE — 1123F ACP DISCUSS/DSCN MKR DOCD: CPT | Performed by: NURSE PRACTITIONER

## 2023-07-13 PROCEDURE — 1090F PRES/ABSN URINE INCON ASSESS: CPT | Performed by: NURSE PRACTITIONER

## 2023-07-13 PROCEDURE — G8400 PT W/DXA NO RESULTS DOC: HCPCS | Performed by: NURSE PRACTITIONER

## 2023-07-13 PROCEDURE — 3075F SYST BP GE 130 - 139MM HG: CPT | Performed by: NURSE PRACTITIONER

## 2023-07-13 PROCEDURE — G0402 INITIAL PREVENTIVE EXAM: HCPCS | Performed by: NURSE PRACTITIONER

## 2023-07-13 PROCEDURE — 1036F TOBACCO NON-USER: CPT | Performed by: NURSE PRACTITIONER

## 2023-07-13 RX ORDER — CIPROFLOXACIN 250 MG/1
250 TABLET, FILM COATED ORAL 2 TIMES DAILY
Qty: 6 TABLET | Refills: 0 | Status: SHIPPED | OUTPATIENT
Start: 2023-07-13 | End: 2023-07-16

## 2023-07-13 SDOH — ECONOMIC STABILITY: FOOD INSECURITY: WITHIN THE PAST 12 MONTHS, YOU WORRIED THAT YOUR FOOD WOULD RUN OUT BEFORE YOU GOT MONEY TO BUY MORE.: NEVER TRUE

## 2023-07-13 SDOH — ECONOMIC STABILITY: INCOME INSECURITY: HOW HARD IS IT FOR YOU TO PAY FOR THE VERY BASICS LIKE FOOD, HOUSING, MEDICAL CARE, AND HEATING?: NOT HARD AT ALL

## 2023-07-13 SDOH — ECONOMIC STABILITY: HOUSING INSECURITY
IN THE LAST 12 MONTHS, WAS THERE A TIME WHEN YOU DID NOT HAVE A STEADY PLACE TO SLEEP OR SLEPT IN A SHELTER (INCLUDING NOW)?: NO

## 2023-07-13 SDOH — ECONOMIC STABILITY: FOOD INSECURITY: WITHIN THE PAST 12 MONTHS, THE FOOD YOU BOUGHT JUST DIDN'T LAST AND YOU DIDN'T HAVE MONEY TO GET MORE.: NEVER TRUE

## 2023-07-13 ASSESSMENT — PATIENT HEALTH QUESTIONNAIRE - PHQ9
1. LITTLE INTEREST OR PLEASURE IN DOING THINGS: 0
SUM OF ALL RESPONSES TO PHQ9 QUESTIONS 1 & 2: 0
SUM OF ALL RESPONSES TO PHQ QUESTIONS 1-9: 0
SUM OF ALL RESPONSES TO PHQ9 QUESTIONS 1 & 2: 0
2. FEELING DOWN, DEPRESSED OR HOPELESS: 0
SUM OF ALL RESPONSES TO PHQ QUESTIONS 1-9: 0
SUM OF ALL RESPONSES TO PHQ QUESTIONS 1-9: 0
1. LITTLE INTEREST OR PLEASURE IN DOING THINGS: 0
SUM OF ALL RESPONSES TO PHQ QUESTIONS 1-9: 0
2. FEELING DOWN, DEPRESSED OR HOPELESS: 0
SUM OF ALL RESPONSES TO PHQ QUESTIONS 1-9: 0
SUM OF ALL RESPONSES TO PHQ QUESTIONS 1-9: 0

## 2023-07-13 ASSESSMENT — LIFESTYLE VARIABLES
HOW MANY STANDARD DRINKS CONTAINING ALCOHOL DO YOU HAVE ON A TYPICAL DAY: 1 OR 2
HOW OFTEN DO YOU HAVE A DRINK CONTAINING ALCOHOL: MONTHLY OR LESS

## 2023-07-13 NOTE — ASSESSMENT & PLAN NOTE
Chronic, uncontrolled  Restart losartan 50mg qd  Counseled patient on importance of medication compliance  Routine labs ordered

## 2023-07-13 NOTE — PATIENT INSTRUCTIONS
3 hours or after sweating, drying off with a towel, or swimming. Always wear a seat belt when traveling in a car. Always wear a helmet when riding a bicycle or motorcycle.

## 2023-07-13 NOTE — ASSESSMENT & PLAN NOTE
Acute, uncontrolled  Start Cipro 250mg BID x3 days   Counseled patient on increasing water intake  Urine sent for culture

## 2023-07-13 NOTE — PROGRESS NOTES
Medicare Annual Wellness Visit    Annmarie Gaxiola is here for Urinary Frequency (Painful urination with strong odor x 1 mo.//Would like to restart BP Meds again. Stopped for no reason) and Medicare AWV    Assessment & Plan   Medicare annual wellness visit, initial  Assessment & Plan:  Routine, initial AWV   Health maintenance up to date  Routine labs ordered for screening    Dysuria  Assessment & Plan:  Acute, uncontrolled  Start Cipro 250mg BID x3 days   Counseled patient on increasing water intake  Urine sent for culture  Orders:  -     POCT Urinalysis no Micro  -     Culture, Urine  -     ciprofloxacin (CIPRO) 250 MG tablet; Take 1 tablet by mouth 2 times daily for 3 days, Disp-6 tablet, R-0Normal  Vitamin D deficiency  Assessment & Plan:  Chronic, stable   Continue taking Vit D supplement   Recheck Vit D levels      Orders:  -     Vitamin D 25 Hydroxy; Future  Mixed hyperlipidemia  Assessment & Plan:  Chronic, uncontrolled. Routine labs ordered. Restart statin. Orders:  -     Lipid Panel; Future  Essential hypertension  Assessment & Plan:  Chronic, uncontrolled  Restart losartan 50mg qd  Counseled patient on importance of medication compliance  Routine labs ordered       Orders:  -     Basic Metabolic Panel; Future  Elevated glucose  Assessment & Plan:  Chronic, stable   Routine labs ordered    Orders:  -     Hemoglobin A1C; Future    Recommendations for Preventive Services Due: see orders and patient instructions/AVS.  Recommended screening schedule for the next 5-10 years is provided to the patient in written form: see Patient Instructions/AVS.     No follow-ups on file. Subjective     Here today for AWV. Has UTI s/s. States this has been going on for a couple weeks. She has had burning and increased frequency of urine. She denies fever, chills, or flank pain. She states she has had UTI in the past and this feels the same. She states it has not worsened however is not getting better.  She has tried

## 2023-07-14 DIAGNOSIS — E55.9 VITAMIN D DEFICIENCY: ICD-10-CM

## 2023-07-14 DIAGNOSIS — E78.2 MIXED HYPERLIPIDEMIA: ICD-10-CM

## 2023-07-14 LAB
EST. AVERAGE GLUCOSE BLD GHB EST-MCNC: 122.6 MG/DL
HBA1C MFR BLD: 5.9 %

## 2023-07-14 RX ORDER — ATORVASTATIN CALCIUM 20 MG/1
20 TABLET, FILM COATED ORAL DAILY
Qty: 90 TABLET | Refills: 1 | Status: SHIPPED | OUTPATIENT
Start: 2023-07-14

## 2023-07-14 RX ORDER — ERGOCALCIFEROL 1.25 MG/1
50000 CAPSULE ORAL WEEKLY
Qty: 12 CAPSULE | Refills: 3 | Status: SHIPPED | OUTPATIENT
Start: 2023-07-14

## 2023-07-15 LAB
BACTERIA UR CULT: ABNORMAL
ORGANISM: ABNORMAL

## 2023-07-24 ENCOUNTER — HOSPITAL ENCOUNTER (OUTPATIENT)
Dept: WOMENS IMAGING | Age: 68
Discharge: HOME | End: 2023-07-24
Payer: MEDICARE

## 2023-07-24 VITALS — BODY MASS INDEX: 28.25 KG/M2 | HEIGHT: 67 IN | WEIGHT: 180 LBS

## 2023-07-24 DIAGNOSIS — Z12.31 BREAST CANCER SCREENING BY MAMMOGRAM: ICD-10-CM

## 2023-07-24 PROCEDURE — 77067 SCR MAMMO BI INCL CAD: CPT

## 2023-08-12 PROBLEM — Z00.00 MEDICARE ANNUAL WELLNESS VISIT, INITIAL: Status: RESOLVED | Noted: 2023-07-13 | Resolved: 2023-08-12

## 2023-10-18 ENCOUNTER — HOSPITAL ENCOUNTER (OUTPATIENT)
Dept: MRI IMAGING | Age: 68
Discharge: HOME | End: 2023-10-18
Payer: MEDICARE

## 2023-10-18 DIAGNOSIS — D35.2 PITUITARY BENIGN NEOPLASM (HCC): ICD-10-CM

## 2023-10-18 LAB
CREAT SERPL-MCNC: 0.8 MG/DL (ref 0.6–1.2)
GFR SERPLBLD CREATININE-BSD FMLA CKD-EPI: >60 ML/MIN/{1.73_M2}

## 2023-10-18 PROCEDURE — 82565 ASSAY OF CREATININE: CPT

## 2023-10-18 PROCEDURE — A9577 INJ MULTIHANCE: HCPCS | Performed by: PHYSICIAN ASSISTANT

## 2023-10-18 PROCEDURE — 70553 MRI BRAIN STEM W/O & W/DYE: CPT

## 2023-10-18 PROCEDURE — 36415 COLL VENOUS BLD VENIPUNCTURE: CPT

## 2023-10-18 PROCEDURE — 6360000004 HC RX CONTRAST MEDICATION: Performed by: PHYSICIAN ASSISTANT

## 2023-10-18 RX ADMIN — GADOBENATE DIMEGLUMINE 15 ML: 529 INJECTION, SOLUTION INTRAVENOUS at 11:30

## 2024-04-04 ENCOUNTER — OFFICE VISIT (OUTPATIENT)
Dept: INTERNAL MEDICINE CLINIC | Age: 69
End: 2024-04-04
Payer: MEDICARE

## 2024-04-04 VITALS
DIASTOLIC BLOOD PRESSURE: 100 MMHG | OXYGEN SATURATION: 99 % | WEIGHT: 179 LBS | BODY MASS INDEX: 28.09 KG/M2 | SYSTOLIC BLOOD PRESSURE: 152 MMHG | HEART RATE: 68 BPM | HEIGHT: 67 IN

## 2024-04-04 DIAGNOSIS — E55.9 VITAMIN D DEFICIENCY: ICD-10-CM

## 2024-04-04 DIAGNOSIS — R73.09 ELEVATED GLUCOSE: ICD-10-CM

## 2024-04-04 DIAGNOSIS — R30.0 DYSURIA: ICD-10-CM

## 2024-04-04 DIAGNOSIS — D35.2 BENIGN NEOPLASM OF PITUITARY GLAND AND CRANIOPHARYNGEAL DUCT (HCC): ICD-10-CM

## 2024-04-04 DIAGNOSIS — D35.3 BENIGN NEOPLASM OF PITUITARY GLAND AND CRANIOPHARYNGEAL DUCT (HCC): ICD-10-CM

## 2024-04-04 DIAGNOSIS — E78.2 MIXED HYPERLIPIDEMIA: ICD-10-CM

## 2024-04-04 DIAGNOSIS — I10 ESSENTIAL HYPERTENSION: ICD-10-CM

## 2024-04-04 DIAGNOSIS — R35.0 URINE FREQUENCY: Primary | ICD-10-CM

## 2024-04-04 LAB
BILIRUBIN, POC: NORMAL
BLOOD URINE, POC: NORMAL
CLARITY, POC: CLEAR
COLOR, POC: YELLOW
GLUCOSE URINE, POC: NORMAL
KETONES, POC: NORMAL
LEUKOCYTE EST, POC: NORMAL
NITRITE, POC: NORMAL
PH, POC: 6
PROTEIN, POC: NORMAL
SPECIFIC GRAVITY, POC: 1.02
UROBILINOGEN, POC: 0.2

## 2024-04-04 PROCEDURE — 3080F DIAST BP >= 90 MM HG: CPT | Performed by: NURSE PRACTITIONER

## 2024-04-04 PROCEDURE — 3017F COLORECTAL CA SCREEN DOC REV: CPT | Performed by: NURSE PRACTITIONER

## 2024-04-04 PROCEDURE — 1036F TOBACCO NON-USER: CPT | Performed by: NURSE PRACTITIONER

## 2024-04-04 PROCEDURE — 1123F ACP DISCUSS/DSCN MKR DOCD: CPT | Performed by: NURSE PRACTITIONER

## 2024-04-04 PROCEDURE — 1090F PRES/ABSN URINE INCON ASSESS: CPT | Performed by: NURSE PRACTITIONER

## 2024-04-04 PROCEDURE — 3075F SYST BP GE 130 - 139MM HG: CPT | Performed by: NURSE PRACTITIONER

## 2024-04-04 PROCEDURE — G8419 CALC BMI OUT NRM PARAM NOF/U: HCPCS | Performed by: NURSE PRACTITIONER

## 2024-04-04 PROCEDURE — G8400 PT W/DXA NO RESULTS DOC: HCPCS | Performed by: NURSE PRACTITIONER

## 2024-04-04 PROCEDURE — G8427 DOCREV CUR MEDS BY ELIG CLIN: HCPCS | Performed by: NURSE PRACTITIONER

## 2024-04-04 PROCEDURE — 99214 OFFICE O/P EST MOD 30 MIN: CPT | Performed by: NURSE PRACTITIONER

## 2024-04-04 PROCEDURE — 81002 URINALYSIS NONAUTO W/O SCOPE: CPT | Performed by: NURSE PRACTITIONER

## 2024-04-04 RX ORDER — LOSARTAN POTASSIUM 50 MG/1
50 TABLET ORAL DAILY
Qty: 90 TABLET | Refills: 1 | Status: SHIPPED | OUTPATIENT
Start: 2024-04-04

## 2024-04-04 RX ORDER — CIPROFLOXACIN 250 MG/1
250 TABLET, FILM COATED ORAL 2 TIMES DAILY
Qty: 6 TABLET | Refills: 0 | Status: SHIPPED | OUTPATIENT
Start: 2024-04-04 | End: 2024-04-07

## 2024-04-04 RX ORDER — ATORVASTATIN CALCIUM 20 MG/1
20 TABLET, FILM COATED ORAL DAILY
Qty: 90 TABLET | Refills: 1 | Status: SHIPPED | OUTPATIENT
Start: 2024-04-04

## 2024-04-04 ASSESSMENT — PATIENT HEALTH QUESTIONNAIRE - PHQ9
SUM OF ALL RESPONSES TO PHQ QUESTIONS 1-9: 1
SUM OF ALL RESPONSES TO PHQ9 QUESTIONS 1 & 2: 1
1. LITTLE INTEREST OR PLEASURE IN DOING THINGS: NOT AT ALL
SUM OF ALL RESPONSES TO PHQ QUESTIONS 1-9: 1
2. FEELING DOWN, DEPRESSED OR HOPELESS: SEVERAL DAYS

## 2024-04-04 NOTE — ASSESSMENT & PLAN NOTE
Acute, uncontrolled  Given symptoms, duration, and history, start Cipro 250mg BID x3 days   Counseled patient on increasing water intake  Urine sent for culture

## 2024-04-04 NOTE — PROGRESS NOTES
4/4/24     Chief Complaint   Patient presents with    Urinary Tract Infection     Burning, odor, frequency. Been going on for 2 weeks.      HPI    Here for an acute visit today, concern for UTI   Reports burning, malodorous urine, frequency for about 2 weeks.    Says symptoms are moderate  Denies hematuria, fever, chills, flank pain  Reports symptoms have not gotten worse or better  Says she has a UTI about once a year.   Reports symptoms are very similar to her last UTI in July 2023  Reports she drinks lot of water, cranberry juice, coffee.     Elevated BP today, prescribed 50 mg losartan   Reports she does not take her medication consistently, only about 3-4 times a week. Last dose was yesterday.   Does not monitor BP at home  Denies angina, palpitations, dyspnea, lightheadedness  Reports intermittent mild SOB and dizziness    Allergies   Allergen Reactions    Metoprolol Nausea Only    Macrobid [Nitrofurantoin] Nausea And Vomiting     Current Outpatient Medications   Medication Sig Dispense Refill    losartan (COZAAR) 50 MG tablet Take 1 tablet by mouth daily 90 tablet 1    ciprofloxacin (CIPRO) 250 MG tablet Take 1 tablet by mouth 2 times daily for 3 days 6 tablet 0    atorvastatin (LIPITOR) 20 MG tablet Take 1 tablet by mouth daily 90 tablet 1    ergocalciferol (ERGOCALCIFEROL) 1.25 MG (86806 UT) capsule Take 1 capsule by mouth once a week Weekly for four months (Patient not taking: Reported on 4/4/2024) 12 capsule 3     No current facility-administered medications for this visit.     Review of Systems  Negative other than HPI    Vitals:    04/04/24 1041 04/04/24 1044 04/04/24 1115   BP: (!) 130/90 (!) 140/94 (!) 152/100   Site: Right Upper Arm Left Upper Arm    Position: Sitting Sitting    Cuff Size: Medium Adult Medium Adult    Pulse: 68     SpO2: 99%     Weight: 81.2 kg (179 lb)     Height: 1.702 m (5' 7\")        Physical Exam  Vitals reviewed.   Constitutional:       General: She is not in acute distress.

## 2024-04-04 NOTE — ASSESSMENT & PLAN NOTE
Chronic, stable   Routine labs ordered    Patient encouraged to schedule follow-up in 3 months to assess labs

## 2024-04-04 NOTE — ASSESSMENT & PLAN NOTE
Chronic, uncontrolled  Restart losartan 50mg qd  Counseled patient on importance of medication compliance  Routine labs ordered    Chronic, stable   Routine labs ordered    Patient encouraged to schedule follow-up in 3 months to assess labs

## 2024-04-04 NOTE — ASSESSMENT & PLAN NOTE
Chronic, uncontrolled. Routine labs ordered. Restart atorvastatin. Discussed importance of medication compliance   Routine labs ordered    Patient encouraged to schedule follow-up in 3 months to assess labs    The 10-year ASCVD risk score (Cooper [place], et al., 2019) is: 16%    Values used to calculate the score:      Age: 68 years      Sex: Female      Is Non- : Yes      Diabetic: No      Tobacco smoker: No      Systolic Blood Pressure: 152 mmHg      Is BP treated: Yes      HDL Cholesterol: 63 mg/dL      Total Cholesterol: 237 mg/dL

## 2024-04-04 NOTE — ASSESSMENT & PLAN NOTE
Chronic, stable   Continue taking Vit D supplement   Recheck Vit D levels. Labs ordered to be collected before next visit in 3 months

## 2024-04-06 LAB
BACTERIA UR CULT: ABNORMAL
ORGANISM: ABNORMAL

## 2024-07-16 DIAGNOSIS — E55.9 VITAMIN D DEFICIENCY: ICD-10-CM

## 2024-07-16 DIAGNOSIS — I10 ESSENTIAL HYPERTENSION: ICD-10-CM

## 2024-07-16 DIAGNOSIS — E78.2 MIXED HYPERLIPIDEMIA: ICD-10-CM

## 2024-07-16 DIAGNOSIS — R73.09 ELEVATED GLUCOSE: ICD-10-CM

## 2024-07-16 LAB
25(OH)D3 SERPL-MCNC: 6.2 NG/ML
ALBUMIN SERPL-MCNC: 4.2 G/DL (ref 3.4–5)
ALBUMIN/GLOB SERPL: 1.5 {RATIO} (ref 1.1–2.2)
ALP SERPL-CCNC: 159 U/L (ref 40–129)
ALT SERPL-CCNC: 15 U/L (ref 10–40)
ANION GAP SERPL CALCULATED.3IONS-SCNC: 12 MMOL/L (ref 3–16)
AST SERPL-CCNC: 17 U/L (ref 15–37)
BILIRUB SERPL-MCNC: 0.3 MG/DL (ref 0–1)
BUN SERPL-MCNC: 9 MG/DL (ref 7–20)
CALCIUM SERPL-MCNC: 9.5 MG/DL (ref 8.3–10.6)
CHLORIDE SERPL-SCNC: 108 MMOL/L (ref 99–110)
CHOLEST SERPL-MCNC: 243 MG/DL (ref 0–199)
CO2 SERPL-SCNC: 25 MMOL/L (ref 21–32)
CREAT SERPL-MCNC: 0.8 MG/DL (ref 0.6–1.2)
EST. AVERAGE GLUCOSE BLD GHB EST-MCNC: 122.6 MG/DL
GFR SERPLBLD CREATININE-BSD FMLA CKD-EPI: 81 ML/MIN/{1.73_M2}
GLUCOSE SERPL-MCNC: 108 MG/DL (ref 70–99)
HBA1C MFR BLD: 5.9 %
HDLC SERPL-MCNC: 65 MG/DL (ref 40–60)
LDLC SERPL CALC-MCNC: 153 MG/DL
POTASSIUM SERPL-SCNC: 3.8 MMOL/L (ref 3.5–5.1)
PROT SERPL-MCNC: 7 G/DL (ref 6.4–8.2)
SODIUM SERPL-SCNC: 145 MMOL/L (ref 136–145)
TRIGL SERPL-MCNC: 124 MG/DL (ref 0–150)
VLDLC SERPL CALC-MCNC: 25 MG/DL

## 2024-07-17 DIAGNOSIS — E55.9 VITAMIN D DEFICIENCY: ICD-10-CM

## 2024-07-17 RX ORDER — ERGOCALCIFEROL 1.25 MG/1
50000 CAPSULE ORAL WEEKLY
Qty: 12 CAPSULE | Refills: 3 | Status: SHIPPED | OUTPATIENT
Start: 2024-07-17

## 2024-07-19 ASSESSMENT — LIFESTYLE VARIABLES
HOW OFTEN DO YOU HAVE SIX OR MORE DRINKS ON ONE OCCASION: 1
HOW OFTEN DO YOU HAVE A DRINK CONTAINING ALCOHOL: 2
HOW MANY STANDARD DRINKS CONTAINING ALCOHOL DO YOU HAVE ON A TYPICAL DAY: 1

## 2024-07-22 ENCOUNTER — OFFICE VISIT (OUTPATIENT)
Dept: INTERNAL MEDICINE CLINIC | Age: 69
End: 2024-07-22
Payer: MEDICARE

## 2024-07-22 VITALS
SYSTOLIC BLOOD PRESSURE: 134 MMHG | DIASTOLIC BLOOD PRESSURE: 78 MMHG | HEART RATE: 72 BPM | WEIGHT: 174.6 LBS | BODY MASS INDEX: 27.4 KG/M2 | OXYGEN SATURATION: 97 % | HEIGHT: 67 IN

## 2024-07-22 DIAGNOSIS — H61.21 RIGHT EAR IMPACTED CERUMEN: ICD-10-CM

## 2024-07-22 DIAGNOSIS — Z00.00 INITIAL MEDICARE ANNUAL WELLNESS VISIT: Primary | ICD-10-CM

## 2024-07-22 DIAGNOSIS — R51.9 SINUS HEADACHE: ICD-10-CM

## 2024-07-22 DIAGNOSIS — I10 ESSENTIAL HYPERTENSION: ICD-10-CM

## 2024-07-22 DIAGNOSIS — Z78.0 POST-MENOPAUSAL: ICD-10-CM

## 2024-07-22 DIAGNOSIS — E55.9 VITAMIN D DEFICIENCY: ICD-10-CM

## 2024-07-22 DIAGNOSIS — Z23 NEED FOR PROPHYLACTIC VACCINATION AGAINST STREPTOCOCCUS PNEUMONIAE (PNEUMOCOCCUS): ICD-10-CM

## 2024-07-22 DIAGNOSIS — E78.2 MIXED HYPERLIPIDEMIA: ICD-10-CM

## 2024-07-22 PROCEDURE — 3078F DIAST BP <80 MM HG: CPT | Performed by: NURSE PRACTITIONER

## 2024-07-22 PROCEDURE — G8427 DOCREV CUR MEDS BY ELIG CLIN: HCPCS | Performed by: NURSE PRACTITIONER

## 2024-07-22 PROCEDURE — 1123F ACP DISCUSS/DSCN MKR DOCD: CPT | Performed by: NURSE PRACTITIONER

## 2024-07-22 PROCEDURE — G0009 ADMIN PNEUMOCOCCAL VACCINE: HCPCS | Performed by: NURSE PRACTITIONER

## 2024-07-22 PROCEDURE — G8400 PT W/DXA NO RESULTS DOC: HCPCS | Performed by: NURSE PRACTITIONER

## 2024-07-22 PROCEDURE — G0438 PPPS, INITIAL VISIT: HCPCS | Performed by: NURSE PRACTITIONER

## 2024-07-22 PROCEDURE — 3075F SYST BP GE 130 - 139MM HG: CPT | Performed by: NURSE PRACTITIONER

## 2024-07-22 PROCEDURE — 3017F COLORECTAL CA SCREEN DOC REV: CPT | Performed by: NURSE PRACTITIONER

## 2024-07-22 PROCEDURE — G8419 CALC BMI OUT NRM PARAM NOF/U: HCPCS | Performed by: NURSE PRACTITIONER

## 2024-07-22 PROCEDURE — 69210 REMOVE IMPACTED EAR WAX UNI: CPT | Performed by: NURSE PRACTITIONER

## 2024-07-22 PROCEDURE — 1090F PRES/ABSN URINE INCON ASSESS: CPT | Performed by: NURSE PRACTITIONER

## 2024-07-22 PROCEDURE — 1036F TOBACCO NON-USER: CPT | Performed by: NURSE PRACTITIONER

## 2024-07-22 PROCEDURE — 90677 PCV20 VACCINE IM: CPT | Performed by: NURSE PRACTITIONER

## 2024-07-22 PROCEDURE — 99214 OFFICE O/P EST MOD 30 MIN: CPT | Performed by: NURSE PRACTITIONER

## 2024-07-22 RX ORDER — LOSARTAN POTASSIUM 50 MG/1
50 TABLET ORAL DAILY
Qty: 90 TABLET | Refills: 3 | Status: SHIPPED | OUTPATIENT
Start: 2024-07-22

## 2024-07-22 RX ORDER — FLUTICASONE PROPIONATE 50 MCG
1 SPRAY, SUSPENSION (ML) NASAL DAILY
Qty: 16 G | Refills: 0 | Status: SHIPPED | OUTPATIENT
Start: 2024-07-22

## 2024-07-22 RX ORDER — ATORVASTATIN CALCIUM 20 MG/1
20 TABLET, FILM COATED ORAL DAILY
Qty: 90 TABLET | Refills: 3 | Status: SHIPPED | OUTPATIENT
Start: 2024-07-22

## 2024-07-22 RX ORDER — ERGOCALCIFEROL 1.25 MG/1
50000 CAPSULE ORAL WEEKLY
Qty: 12 CAPSULE | Refills: 3 | Status: SHIPPED | OUTPATIENT
Start: 2024-07-22

## 2024-07-22 NOTE — ASSESSMENT & PLAN NOTE
Chronic. Elevated initially but improved with recheck.  Home BP is reported as controlled and at goal.  Continue losartan 50mg qd.  Most recent labs reviewed. Repeat labs ordered.

## 2024-07-22 NOTE — ASSESSMENT & PLAN NOTE
Chronic, intermittent. Discussed in detail. Start flonase daily during trigger seasons. No red flags or signs of infection on exam.

## 2024-07-22 NOTE — PROGRESS NOTES
written form: see Patient Instructions/AVS.     Return in 1 year (on 7/22/2025), or if symptoms worsen or fail to improve, for Medicare AWV.     Subjective   The following acute and/or chronic problems were also addressed today:    AWV. Doing well on medications. Home BP has been controlled, running 130-140/70s  Having increased stress at home, life stuff. Goes up with increased stressors. Handing her stress okay. Not interesting in taking medication or seeing counselor at this time.     Had labs last week   Vitamin D is very low - has not picked up Rx.   Not taking statin as prescribed.     Having increased sinus HAs the past month, having them 2-3 times a week. Comes and goes, not every day.  Will last a few hrs. Resolves with rest. Tried aleve, with some improvement but not complete relief. Has chronic neck pain, comes and goes. Thinks this is related to how she sleeps.  Denies n/v, blurred vision or dizziness.       Patient's complete Health Risk Assessment and screening values have been reviewed and are found in Flowsheets. The following problems were reviewed today and where indicated follow up appointments were made and/or referrals ordered.    Positive Risk Factor Screenings with Interventions:               General HRA Questions:  Select all that apply: (!) Stress  Interventions - Stress:  See above      Inactivity:  On average, how many days per week do you engage in moderate to strenuous exercise (like a brisk walk)?: 0 days (!) Abnormal     Interventions:  See AVS for additional education material      Dentist Screen:  Have you seen the dentist within the past year?: (!) No    Intervention:  Advised to schedule with their dentist        Advanced Directives:  Do you have a Living Will?: (!) No    Intervention:  has NO advanced directive - information provided                   Objective   Vitals:    07/22/24 0833 07/22/24 0845   BP: (!) 140/78 134/78   Site: Right Upper Arm    Position: Sitting    Cuff

## 2024-07-22 NOTE — PATIENT INSTRUCTIONS
vaping. If you need help quitting, talk to your doctor about stop-smoking programs and medicines. These can increase your chances of quitting for good. Quitting is one of the most important things you can do to protect your heart. It is never too late to quit. Try to avoid secondhand smoke too.     Stay at a weight that's healthy for you. Talk to your doctor if you need help losing weight.     Try to get 7 to 9 hours of sleep each night.     Limit alcohol to 2 drinks a day for men and 1 drink a day for women. Too much alcohol can cause health problems.     Manage other health problems such as diabetes, high blood pressure, and high cholesterol. If you think you may have a problem with alcohol or drug use, talk to your doctor.   Medicines    Take your medicines exactly as prescribed. Call your doctor if you think you are having a problem with your medicine.     If your doctor recommends aspirin, take the amount directed each day. Make sure you take aspirin and not another kind of pain reliever, such as acetaminophen (Tylenol).   When should you call for help?   Call 911 if you have symptoms of a heart attack. These may include:    Chest pain or pressure, or a strange feeling in the chest.     Sweating.     Shortness of breath.     Pain, pressure, or a strange feeling in the back, neck, jaw, or upper belly or in one or both shoulders or arms.     Lightheadedness or sudden weakness.     A fast or irregular heartbeat.   After you call 911, the  may tell you to chew 1 adult-strength or 2 to 4 low-dose aspirin. Wait for an ambulance. Do not try to drive yourself.  Watch closely for changes in your health, and be sure to contact your doctor if you have any problems.  Where can you learn more?  Go to https://www.[employer].net/patientEd and enter F075 to learn more about \"A Healthy Heart: Care Instructions.\"  Current as of: June 24, 2023  Content Version: 14.1  © 7227-4011 [employer].   Care

## 2024-07-22 NOTE — ASSESSMENT & PLAN NOTE
Chronic, controlled. Most recent labs reviewed. Discussed importance of medication compliance. Work on diet/ exercise. Agreeable to taking statin daily as prescribed.

## 2024-07-25 ENCOUNTER — TELEPHONE (OUTPATIENT)
Dept: INTERNAL MEDICINE CLINIC | Age: 69
End: 2024-07-25

## 2024-07-25 ENCOUNTER — HOSPITAL ENCOUNTER (OUTPATIENT)
Dept: WOMENS IMAGING | Age: 69
Discharge: HOME | End: 2024-07-25
Payer: MEDICARE

## 2024-07-25 VITALS — HEIGHT: 67 IN | WEIGHT: 175 LBS | BODY MASS INDEX: 27.47 KG/M2

## 2024-07-25 DIAGNOSIS — Z12.31 VISIT FOR SCREENING MAMMOGRAM: ICD-10-CM

## 2024-07-25 PROCEDURE — 77063 BREAST TOMOSYNTHESIS BI: CPT

## 2024-07-31 ENCOUNTER — HOSPITAL ENCOUNTER (OUTPATIENT)
Dept: GENERAL RADIOLOGY | Age: 69
Discharge: HOME | End: 2024-07-31
Payer: MEDICARE

## 2024-07-31 DIAGNOSIS — Z78.0 POST-MENOPAUSAL: ICD-10-CM

## 2024-07-31 PROCEDURE — 77080 DXA BONE DENSITY AXIAL: CPT

## 2024-08-21 PROBLEM — Z00.00 INITIAL MEDICARE ANNUAL WELLNESS VISIT: Status: RESOLVED | Noted: 2024-07-22 | Resolved: 2024-08-21

## 2024-10-22 ENCOUNTER — HOSPITAL ENCOUNTER (OUTPATIENT)
Dept: MRI IMAGING | Age: 69
Discharge: HOME | End: 2024-10-22
Attending: NEUROLOGICAL SURGERY
Payer: MEDICARE

## 2024-10-22 DIAGNOSIS — D35.2 PITUITARY ADENOMA (HCC): ICD-10-CM

## 2024-10-22 LAB
CREAT SERPL-MCNC: 0.8 MG/DL (ref 0.6–1.2)
GFR SERPLBLD CREATININE-BSD FMLA CKD-EPI: 81 ML/MIN/{1.73_M2}

## 2024-10-22 PROCEDURE — 82565 ASSAY OF CREATININE: CPT

## 2024-10-22 PROCEDURE — 6360000004 HC RX CONTRAST MEDICATION: Performed by: NEUROLOGICAL SURGERY

## 2024-10-22 PROCEDURE — 36415 COLL VENOUS BLD VENIPUNCTURE: CPT

## 2024-10-22 PROCEDURE — 70553 MRI BRAIN STEM W/O & W/DYE: CPT

## 2024-10-22 PROCEDURE — A9577 INJ MULTIHANCE: HCPCS | Performed by: NEUROLOGICAL SURGERY

## 2024-10-22 RX ADMIN — GADOBENATE DIMEGLUMINE 15 ML: 529 INJECTION, SOLUTION INTRAVENOUS at 07:31

## 2024-10-23 DIAGNOSIS — I10 ESSENTIAL HYPERTENSION: ICD-10-CM

## 2024-10-23 RX ORDER — LOSARTAN POTASSIUM 50 MG/1
50 TABLET ORAL DAILY
Qty: 90 TABLET | Refills: 3 | Status: SHIPPED | OUTPATIENT
Start: 2024-10-23

## 2024-10-23 NOTE — TELEPHONE ENCOUNTER
Last OV: 7/22/2024  Next OV: Visit date not found    Next appointment due:7/22/2025     Last fill:7/22/24  Refills:3

## 2024-11-10 DIAGNOSIS — E78.2 MIXED HYPERLIPIDEMIA: ICD-10-CM

## 2024-11-11 RX ORDER — ATORVASTATIN CALCIUM 20 MG/1
20 TABLET, FILM COATED ORAL DAILY
Qty: 90 TABLET | Refills: 3 | Status: SHIPPED | OUTPATIENT
Start: 2024-11-11

## 2024-12-10 ENCOUNTER — OFFICE VISIT (OUTPATIENT)
Dept: INTERNAL MEDICINE CLINIC | Age: 69
End: 2024-12-10

## 2024-12-10 VITALS
WEIGHT: 175 LBS | BODY MASS INDEX: 27.47 KG/M2 | DIASTOLIC BLOOD PRESSURE: 96 MMHG | SYSTOLIC BLOOD PRESSURE: 148 MMHG | OXYGEN SATURATION: 97 % | HEART RATE: 87 BPM | HEIGHT: 67 IN

## 2024-12-10 DIAGNOSIS — E55.9 VITAMIN D DEFICIENCY: ICD-10-CM

## 2024-12-10 DIAGNOSIS — F41.9 ANXIETY: Primary | ICD-10-CM

## 2024-12-10 DIAGNOSIS — I10 ESSENTIAL HYPERTENSION: ICD-10-CM

## 2024-12-10 DIAGNOSIS — D35.2 BENIGN NEOPLASM OF PITUITARY GLAND AND CRANIOPHARYNGEAL DUCT (HCC): ICD-10-CM

## 2024-12-10 DIAGNOSIS — D35.3 BENIGN NEOPLASM OF PITUITARY GLAND AND CRANIOPHARYNGEAL DUCT (HCC): ICD-10-CM

## 2024-12-10 DIAGNOSIS — E78.2 MIXED HYPERLIPIDEMIA: ICD-10-CM

## 2024-12-10 DIAGNOSIS — R73.09 ELEVATED GLUCOSE: ICD-10-CM

## 2024-12-10 PROBLEM — R30.0 DYSURIA: Status: RESOLVED | Noted: 2023-07-13 | Resolved: 2024-12-10

## 2024-12-10 PROBLEM — R35.0 URINE FREQUENCY: Status: RESOLVED | Noted: 2024-04-04 | Resolved: 2024-12-10

## 2024-12-10 RX ORDER — HYDROXYZINE HYDROCHLORIDE 50 MG/1
50 TABLET, FILM COATED ORAL EVERY 8 HOURS PRN
Qty: 30 TABLET | Refills: 0 | Status: SHIPPED | OUTPATIENT
Start: 2024-12-10 | End: 2024-12-20

## 2024-12-10 NOTE — ASSESSMENT & PLAN NOTE
Chronic, elevated today  Has been elevated intermittently at home the past few weeks with increased stress. Discussed increasing her losartan dose, holding off today. Monitor BP at home and bring log to next appt. Limit sodium and caffeine intake. Fasting labs prior to next appt.     Orders:    Comprehensive Metabolic Panel; Future    CBC; Future

## 2024-12-10 NOTE — ASSESSMENT & PLAN NOTE
Chronic, uncontrolled.   Discussed options in detail   Declines CBT today   Start sertraline 25 mg daily for 6 days then increase to 50 mg daily.  Fasting BW ordered, return for labs prior to next appt     Orders:    sertraline (ZOLOFT) 50 MG tablet; Take 0.5 tablet daily for 6 days, then increase to 1 tablet daily    hydrOXYzine HCl (ATARAX) 50 MG tablet; Take 1 tablet by mouth every 8 hours as needed for Anxiety    TSH with Reflex to FT4 ([place] Only); Future    CBC; Future

## 2024-12-10 NOTE — PROGRESS NOTES
12/10/24     Chief Complaint   Patient presents with    Stress     Extremely stressed for 2 weeks. Asking for medication to help maintain.      HPI    Here for anxiety and increased stress   She has been having increased anxiety the past few years but was managing it.  Has been extreme the past few weeks   Lots of things going on at home with life, fiamena, kids   Years ago she thinks she was on a medication   Sleep has been okay most night    HTN - home BP has been high the past few weeks. It was better controlled a few weeks ago, 130-140/70-80. BP is often high in office.     HLD - lipids elevated in July, was not taking atorvastatin daily at that time. Has since been taking it daily.     Allergies   Allergen Reactions    Metoprolol Nausea Only       Current Outpatient Medications   Medication Sig Dispense Refill    sertraline (ZOLOFT) 50 MG tablet Take 0.5 tablet daily for 6 days, then increase to 1 tablet daily 30 tablet 1    hydrOXYzine HCl (ATARAX) 50 MG tablet Take 1 tablet by mouth every 8 hours as needed for Anxiety 30 tablet 0    atorvastatin (LIPITOR) 20 MG tablet TAKE 1 TABLET BY MOUTH EVERY DAY 90 tablet 3    losartan (COZAAR) 50 MG tablet TAKE 1 TABLET BY MOUTH EVERY DAY 90 tablet 3    vitamin D (ERGOCALCIFEROL) 1.25 MG (70417 UT) CAPS capsule Take 1 capsule by mouth once a week Weekly for four months 12 capsule 3    fluticasone (FLONASE) 50 MCG/ACT nasal spray 1 spray by Each Nostril route daily 16 g 0     No current facility-administered medications for this visit.     Review of Systems  Negative other than HPI     Vitals:    12/10/24 1317 12/10/24 1320   BP: (!) 158/100 (!) 148/96   Site: Left Upper Arm    Position: Sitting    Cuff Size: Large Adult    Pulse: 87    SpO2: 97%    Weight: 79.4 kg (175 lb)    Height: 1.702 m (5' 7\")       Physical Exam  Constitutional:       General: She is not in acute distress.     Appearance: Normal appearance. She is not ill-appearing.   HENT:      Head: 
none

## 2024-12-10 NOTE — ASSESSMENT & PLAN NOTE
Chronic, has been taking statin more consistently, repeat labs ordered.     Orders:    Lipid Panel; Future

## 2025-01-21 DIAGNOSIS — F41.9 ANXIETY: ICD-10-CM

## 2025-01-21 DIAGNOSIS — E78.2 MIXED HYPERLIPIDEMIA: ICD-10-CM

## 2025-01-21 DIAGNOSIS — E55.9 VITAMIN D DEFICIENCY: ICD-10-CM

## 2025-01-21 DIAGNOSIS — I10 ESSENTIAL HYPERTENSION: ICD-10-CM

## 2025-01-21 DIAGNOSIS — R73.09 ELEVATED GLUCOSE: ICD-10-CM

## 2025-01-22 LAB
25(OH)D3 SERPL-MCNC: 24.5 NG/ML
ALBUMIN SERPL-MCNC: 4.5 G/DL (ref 3.4–5)
ALBUMIN/GLOB SERPL: 1.9 {RATIO} (ref 1.1–2.2)
ALP SERPL-CCNC: 132 U/L (ref 40–129)
ALT SERPL-CCNC: 19 U/L (ref 10–40)
ANION GAP SERPL CALCULATED.3IONS-SCNC: 9 MMOL/L (ref 3–16)
AST SERPL-CCNC: 22 U/L (ref 15–37)
BILIRUB SERPL-MCNC: <0.2 MG/DL (ref 0–1)
BUN SERPL-MCNC: 11 MG/DL (ref 7–20)
CALCIUM SERPL-MCNC: 9.6 MG/DL (ref 8.3–10.6)
CHLORIDE SERPL-SCNC: 106 MMOL/L (ref 99–110)
CHOLEST SERPL-MCNC: 227 MG/DL (ref 0–199)
CO2 SERPL-SCNC: 26 MMOL/L (ref 21–32)
CREAT SERPL-MCNC: 0.7 MG/DL (ref 0.6–1.2)
DEPRECATED RDW RBC AUTO: 15.6 % (ref 12.4–15.4)
EST. AVERAGE GLUCOSE BLD GHB EST-MCNC: 116.9 MG/DL
GFR SERPLBLD CREATININE-BSD FMLA CKD-EPI: >90 ML/MIN/{1.73_M2}
GLUCOSE SERPL-MCNC: 100 MG/DL (ref 70–99)
HBA1C MFR BLD: 5.7 %
HCT VFR BLD AUTO: 36.1 % (ref 36–48)
HDLC SERPL-MCNC: 63 MG/DL (ref 40–60)
HGB BLD-MCNC: 12.1 G/DL (ref 12–16)
LDLC SERPL CALC-MCNC: 146 MG/DL
MCH RBC QN AUTO: 31.9 PG (ref 26–34)
MCHC RBC AUTO-ENTMCNC: 33.5 G/DL (ref 31–36)
MCV RBC AUTO: 95.3 FL (ref 80–100)
PLATELET # BLD AUTO: 353 K/UL (ref 135–450)
PMV BLD AUTO: 8.4 FL (ref 5–10.5)
POTASSIUM SERPL-SCNC: 4 MMOL/L (ref 3.5–5.1)
PROT SERPL-MCNC: 6.9 G/DL (ref 6.4–8.2)
RBC # BLD AUTO: 3.79 M/UL (ref 4–5.2)
SODIUM SERPL-SCNC: 141 MMOL/L (ref 136–145)
TRIGL SERPL-MCNC: 89 MG/DL (ref 0–150)
TSH SERPL DL<=0.005 MIU/L-ACNC: 1.3 UIU/ML (ref 0.27–4.2)
VLDLC SERPL CALC-MCNC: 18 MG/DL
WBC # BLD AUTO: 6.2 K/UL (ref 4–11)

## 2025-01-29 SDOH — ECONOMIC STABILITY: FOOD INSECURITY: WITHIN THE PAST 12 MONTHS, YOU WORRIED THAT YOUR FOOD WOULD RUN OUT BEFORE YOU GOT MONEY TO BUY MORE.: NEVER TRUE

## 2025-01-29 SDOH — ECONOMIC STABILITY: INCOME INSECURITY: IN THE LAST 12 MONTHS, WAS THERE A TIME WHEN YOU WERE NOT ABLE TO PAY THE MORTGAGE OR RENT ON TIME?: NO

## 2025-01-29 SDOH — ECONOMIC STABILITY: TRANSPORTATION INSECURITY
IN THE PAST 12 MONTHS, HAS THE LACK OF TRANSPORTATION KEPT YOU FROM MEDICAL APPOINTMENTS OR FROM GETTING MEDICATIONS?: NO

## 2025-01-29 SDOH — ECONOMIC STABILITY: FOOD INSECURITY: WITHIN THE PAST 12 MONTHS, THE FOOD YOU BOUGHT JUST DIDN'T LAST AND YOU DIDN'T HAVE MONEY TO GET MORE.: NEVER TRUE

## 2025-01-29 SDOH — ECONOMIC STABILITY: TRANSPORTATION INSECURITY
IN THE PAST 12 MONTHS, HAS LACK OF TRANSPORTATION KEPT YOU FROM MEETINGS, WORK, OR FROM GETTING THINGS NEEDED FOR DAILY LIVING?: NO

## 2025-01-29 ASSESSMENT — PATIENT HEALTH QUESTIONNAIRE - PHQ9
1. LITTLE INTEREST OR PLEASURE IN DOING THINGS: SEVERAL DAYS
2. FEELING DOWN, DEPRESSED OR HOPELESS: NOT AT ALL
2. FEELING DOWN, DEPRESSED OR HOPELESS: NOT AT ALL
SUM OF ALL RESPONSES TO PHQ QUESTIONS 1-9: 1
SUM OF ALL RESPONSES TO PHQ9 QUESTIONS 1 & 2: 1
SUM OF ALL RESPONSES TO PHQ9 QUESTIONS 1 & 2: 1
1. LITTLE INTEREST OR PLEASURE IN DOING THINGS: SEVERAL DAYS
SUM OF ALL RESPONSES TO PHQ QUESTIONS 1-9: 1

## 2025-01-30 ENCOUNTER — OFFICE VISIT (OUTPATIENT)
Dept: INTERNAL MEDICINE CLINIC | Age: 70
End: 2025-01-30
Payer: MEDICARE

## 2025-01-30 VITALS
DIASTOLIC BLOOD PRESSURE: 90 MMHG | SYSTOLIC BLOOD PRESSURE: 160 MMHG | BODY MASS INDEX: 27.62 KG/M2 | HEIGHT: 67 IN | WEIGHT: 176 LBS

## 2025-01-30 DIAGNOSIS — D35.3 BENIGN NEOPLASM OF PITUITARY GLAND AND CRANIOPHARYNGEAL DUCT (HCC): ICD-10-CM

## 2025-01-30 DIAGNOSIS — D35.2 PITUITARY ADENOMA (HCC): ICD-10-CM

## 2025-01-30 DIAGNOSIS — F41.9 ANXIETY: ICD-10-CM

## 2025-01-30 DIAGNOSIS — D35.2 BENIGN NEOPLASM OF PITUITARY GLAND AND CRANIOPHARYNGEAL DUCT (HCC): ICD-10-CM

## 2025-01-30 DIAGNOSIS — E55.9 VITAMIN D DEFICIENCY: ICD-10-CM

## 2025-01-30 DIAGNOSIS — E78.2 MIXED HYPERLIPIDEMIA: ICD-10-CM

## 2025-01-30 DIAGNOSIS — R41.3 MEMORY CHANGE: ICD-10-CM

## 2025-01-30 DIAGNOSIS — I10 ESSENTIAL HYPERTENSION: Primary | ICD-10-CM

## 2025-01-30 LAB
BILIRUBIN, POC: NEGATIVE
BLOOD URINE, POC: NEGATIVE
CLARITY, POC: CLEAR
COLOR, POC: YELLOW
GLUCOSE URINE, POC: NEGATIVE MG/DL
KETONES, POC: NEGATIVE MG/DL
LEUKOCYTE EST, POC: NEGATIVE
NITRITE, POC: NEGATIVE
PH, POC: 7
PROTEIN, POC: NEGATIVE MG/DL
SPECIFIC GRAVITY, POC: 1.02
UROBILINOGEN, POC: NEGATIVE MG/DL

## 2025-01-30 PROCEDURE — 1159F MED LIST DOCD IN RCRD: CPT | Performed by: NURSE PRACTITIONER

## 2025-01-30 PROCEDURE — 1036F TOBACCO NON-USER: CPT | Performed by: NURSE PRACTITIONER

## 2025-01-30 PROCEDURE — G8419 CALC BMI OUT NRM PARAM NOF/U: HCPCS | Performed by: NURSE PRACTITIONER

## 2025-01-30 PROCEDURE — G8427 DOCREV CUR MEDS BY ELIG CLIN: HCPCS | Performed by: NURSE PRACTITIONER

## 2025-01-30 PROCEDURE — 3080F DIAST BP >= 90 MM HG: CPT | Performed by: NURSE PRACTITIONER

## 2025-01-30 PROCEDURE — G2211 COMPLEX E/M VISIT ADD ON: HCPCS | Performed by: NURSE PRACTITIONER

## 2025-01-30 PROCEDURE — 3077F SYST BP >= 140 MM HG: CPT | Performed by: NURSE PRACTITIONER

## 2025-01-30 PROCEDURE — 1123F ACP DISCUSS/DSCN MKR DOCD: CPT | Performed by: NURSE PRACTITIONER

## 2025-01-30 PROCEDURE — 3017F COLORECTAL CA SCREEN DOC REV: CPT | Performed by: NURSE PRACTITIONER

## 2025-01-30 PROCEDURE — 1090F PRES/ABSN URINE INCON ASSESS: CPT | Performed by: NURSE PRACTITIONER

## 2025-01-30 PROCEDURE — G8399 PT W/DXA RESULTS DOCUMENT: HCPCS | Performed by: NURSE PRACTITIONER

## 2025-01-30 PROCEDURE — 99214 OFFICE O/P EST MOD 30 MIN: CPT | Performed by: NURSE PRACTITIONER

## 2025-01-30 PROCEDURE — 81002 URINALYSIS NONAUTO W/O SCOPE: CPT | Performed by: NURSE PRACTITIONER

## 2025-01-30 RX ORDER — ESCITALOPRAM OXALATE 10 MG/1
10 TABLET ORAL DAILY
Qty: 30 TABLET | Refills: 1 | Status: SHIPPED | OUTPATIENT
Start: 2025-01-30

## 2025-01-30 RX ORDER — ATORVASTATIN CALCIUM 40 MG/1
40 TABLET, FILM COATED ORAL DAILY
Qty: 90 TABLET | Refills: 1 | Status: SHIPPED | OUTPATIENT
Start: 2025-01-30

## 2025-01-30 RX ORDER — LOSARTAN POTASSIUM 100 MG/1
100 TABLET ORAL DAILY
Qty: 90 TABLET | Refills: 1 | Status: SHIPPED | OUTPATIENT
Start: 2025-01-30

## 2025-01-30 NOTE — ASSESSMENT & PLAN NOTE
Chronic, uncontrolled. Blood pressure readings have been inconsistent, often exceeding 140, with occasional spikes to 170.  Increase losartan to 100 mg daily for better control  Continue to monitor BP at home and    Orders:    losartan (COZAAR) 100 MG tablet; Take 1 tablet by mouth daily

## 2025-01-30 NOTE — ASSESSMENT & PLAN NOTE
Chronic, uncontrolled.  Increase atorvastatin to 40 mg daily.  New prescription sent to pharmacy.    Orders:    atorvastatin (LIPITOR) 40 MG tablet; Take 1 tablet by mouth daily

## 2025-01-30 NOTE — ASSESSMENT & PLAN NOTE
Chronic, improved with sertraline.  Concern for brain fog with sertraline due to forgetfulness since starting medication.   Stop stertraline due to side effects  Start lexapro.   Follow-up in 4 weeks to determine medication effectiveness    Orders:    escitalopram (LEXAPRO) 10 MG tablet; Take 1 tablet by mouth daily

## 2025-01-30 NOTE — PROGRESS NOTES
1/30/25     Chief Complaint   Patient presents with    4 week follow-up     mood and BP   Pt did not bring log        History of Present Illness  The patient presents for evaluation of blood pressure, anxiety, memory issues, and prediabetes.    Blood Pressure  - Reports significant improvement in blood pressure readings, fluctuating between 135 and 170, with occasional spikes to 170  - Notes that blood pressure is not elevated every day, is elevated more often than not   - Mentions that blood pressure might be high today due to running late for appointment  - Currently on a regimen of losartan 50 mg    Anxiety  -  taking sertraline daily and is feeling better  - Reports anxiety is now under control most of the time  - Does not believe an increase in dosage is necessary  - Notes having more good days than bad since starting the medication    Memory Issues  - Experiencing memory lapses for the past few weeks, attributed to stress  - Reports no urinary symptoms  - Describes memory issues as short-term, such as forgetting tasks immediately after getting up to start them  - Episodes are not constant but have been occurring more frequently  - Reports feels like she is straining to remember things  - Not currently taking a B12 supplement  -denies any known family history of dementia   - not clear if related sertraline, did start after starting medication     FAMILY HISTORY  - No family history of dementia, Alzheimer's, or Parkinson's      Allergies   Allergen Reactions    Metoprolol Nausea Only       Current Outpatient Medications   Medication Sig Dispense Refill    losartan (COZAAR) 100 MG tablet Take 1 tablet by mouth daily 90 tablet 1    atorvastatin (LIPITOR) 40 MG tablet Take 1 tablet by mouth daily 90 tablet 1    escitalopram (LEXAPRO) 10 MG tablet Take 1 tablet by mouth daily 30 tablet 1    vitamin D (ERGOCALCIFEROL) 1.25 MG (37696 UT) CAPS capsule Take 1 capsule by mouth once a week Weekly for four months 12

## 2025-03-07 NOTE — PATIENT INSTRUCTIONS
Mike Casanova received a viral test for COVID-19. They were educated on isolation and quarantine as appropriate. For any symptoms, they were directed to seek care from their PCP, given contact information to establish with a doctor, directed to an urgent care or the emergency room.
EKG - see results section for interpretation

## 2025-03-26 ENCOUNTER — OFFICE VISIT (OUTPATIENT)
Dept: INTERNAL MEDICINE CLINIC | Age: 70
End: 2025-03-26
Payer: MEDICARE

## 2025-03-26 VITALS
OXYGEN SATURATION: 99 % | WEIGHT: 171.2 LBS | HEART RATE: 75 BPM | BODY MASS INDEX: 26.87 KG/M2 | DIASTOLIC BLOOD PRESSURE: 84 MMHG | HEIGHT: 67 IN | SYSTOLIC BLOOD PRESSURE: 152 MMHG

## 2025-03-26 DIAGNOSIS — I10 ESSENTIAL HYPERTENSION: Primary | ICD-10-CM

## 2025-03-26 DIAGNOSIS — F41.9 ANXIETY: ICD-10-CM

## 2025-03-26 DIAGNOSIS — R41.3 MEMORY CHANGE: ICD-10-CM

## 2025-03-26 PROCEDURE — G8419 CALC BMI OUT NRM PARAM NOF/U: HCPCS | Performed by: NURSE PRACTITIONER

## 2025-03-26 PROCEDURE — 1123F ACP DISCUSS/DSCN MKR DOCD: CPT | Performed by: NURSE PRACTITIONER

## 2025-03-26 PROCEDURE — 1090F PRES/ABSN URINE INCON ASSESS: CPT | Performed by: NURSE PRACTITIONER

## 2025-03-26 PROCEDURE — 1036F TOBACCO NON-USER: CPT | Performed by: NURSE PRACTITIONER

## 2025-03-26 PROCEDURE — G8427 DOCREV CUR MEDS BY ELIG CLIN: HCPCS | Performed by: NURSE PRACTITIONER

## 2025-03-26 PROCEDURE — G8399 PT W/DXA RESULTS DOCUMENT: HCPCS | Performed by: NURSE PRACTITIONER

## 2025-03-26 PROCEDURE — G2211 COMPLEX E/M VISIT ADD ON: HCPCS | Performed by: NURSE PRACTITIONER

## 2025-03-26 PROCEDURE — 3077F SYST BP >= 140 MM HG: CPT | Performed by: NURSE PRACTITIONER

## 2025-03-26 PROCEDURE — 1159F MED LIST DOCD IN RCRD: CPT | Performed by: NURSE PRACTITIONER

## 2025-03-26 PROCEDURE — 3017F COLORECTAL CA SCREEN DOC REV: CPT | Performed by: NURSE PRACTITIONER

## 2025-03-26 PROCEDURE — 3079F DIAST BP 80-89 MM HG: CPT | Performed by: NURSE PRACTITIONER

## 2025-03-26 PROCEDURE — 99214 OFFICE O/P EST MOD 30 MIN: CPT | Performed by: NURSE PRACTITIONER

## 2025-03-26 RX ORDER — HYDROCHLOROTHIAZIDE 12.5 MG/1
12.5 CAPSULE ORAL EVERY MORNING
Qty: 90 CAPSULE | Refills: 1 | Status: SHIPPED | OUTPATIENT
Start: 2025-03-26

## 2025-03-26 NOTE — ASSESSMENT & PLAN NOTE
Chronic, at goal (stable), continue current treatment plan  - Significant improvement since switching from sertraline to Lexapro  - No longer experiences brain fog, feels much better  - Positive response to Lexapro, continue use

## 2025-03-26 NOTE — ASSESSMENT & PLAN NOTE
Chronic, uncontrolled.   - Blood pressure readings improved but remain suboptimal, most recent 152/84  - Add low-dose diuretic to current losartan regimen  - Ordered blood test to be completed before next appointment  - Continue home blood pressure monitoring  - continue to work on limiting sodium, caffeine and external stressors    Orders:    hydroCHLOROthiazide 12.5 MG capsule; Take 1 capsule by mouth every morning    Basic Metabolic Panel; Future

## 2025-03-26 NOTE — PROGRESS NOTES
3/26/25     Chief Complaint   Patient presents with    4 week follow up     BP, Mood, and Memory        History of Present Illness  The patient presents for evaluation of hypertension and mood disorder.    Hypertension  - The patient's blood pressure has shown improvement, with recent readings ranging between 150-160 mmHg systolic and in the 70s mmHg diastolic, not consistently elevated on a daily basis.  - The patient's overall well-being has significantly improved.  - The patient is currently on losartan, which was increased at the last visit.  - Previous antihypertensive medications included metoprolol, which caused nausea, and triamterene, which led to hypokalemia.    Mood Disorder  - The patient's mood has improved since transitioning from sertraline to escitalopram.  - The patient no longer experiences cognitive impairment and reports a significant improvement in overall mood.    Allergies   Allergen Reactions    Metoprolol Nausea Only       Current Outpatient Medications   Medication Sig Dispense Refill    hydroCHLOROthiazide 12.5 MG capsule Take 1 capsule by mouth every morning 90 capsule 1    losartan (COZAAR) 100 MG tablet Take 1 tablet by mouth daily 90 tablet 1    atorvastatin (LIPITOR) 40 MG tablet Take 1 tablet by mouth daily 90 tablet 1    escitalopram (LEXAPRO) 10 MG tablet Take 1 tablet by mouth daily 30 tablet 1    vitamin D (ERGOCALCIFEROL) 1.25 MG (88190 UT) CAPS capsule Take 1 capsule by mouth once a week Weekly for four months 12 capsule 3    fluticasone (FLONASE) 50 MCG/ACT nasal spray 1 spray by Each Nostril route daily 16 g 0     No current facility-administered medications for this visit.     Review of Systems  Negative other than HPI     Results  - Labs:    - Kidney function: Normal       Vitals:    03/26/25 0950 03/26/25 1009   BP: (!) 162/88 (!) 152/84   BP Site: Right Upper Arm    Patient Position: Sitting    BP Cuff Size: Medium Adult    Pulse: 75    SpO2: 99%    Weight: 77.7 kg (171

## 2025-04-03 DIAGNOSIS — I10 ESSENTIAL HYPERTENSION: ICD-10-CM

## 2025-04-03 LAB
ANION GAP SERPL CALCULATED.3IONS-SCNC: 10 MMOL/L (ref 3–16)
BUN SERPL-MCNC: 15 MG/DL (ref 7–20)
CALCIUM SERPL-MCNC: 10.2 MG/DL (ref 8.3–10.6)
CHLORIDE SERPL-SCNC: 104 MMOL/L (ref 99–110)
CO2 SERPL-SCNC: 26 MMOL/L (ref 21–32)
CREAT SERPL-MCNC: 0.9 MG/DL (ref 0.6–1.2)
GFR SERPLBLD CREATININE-BSD FMLA CKD-EPI: 70 ML/MIN/{1.73_M2}
GLUCOSE SERPL-MCNC: 106 MG/DL (ref 70–99)
POTASSIUM SERPL-SCNC: 4.3 MMOL/L (ref 3.5–5.1)
SODIUM SERPL-SCNC: 140 MMOL/L (ref 136–145)

## 2025-04-07 ENCOUNTER — RESULTS FOLLOW-UP (OUTPATIENT)
Dept: INTERNAL MEDICINE CLINIC | Age: 70
End: 2025-04-07

## 2025-04-09 ENCOUNTER — OFFICE VISIT (OUTPATIENT)
Dept: INTERNAL MEDICINE CLINIC | Age: 70
End: 2025-04-09
Payer: MEDICARE

## 2025-04-09 VITALS
HEIGHT: 67 IN | SYSTOLIC BLOOD PRESSURE: 140 MMHG | DIASTOLIC BLOOD PRESSURE: 86 MMHG | BODY MASS INDEX: 27.03 KG/M2 | OXYGEN SATURATION: 97 % | HEART RATE: 71 BPM | WEIGHT: 172.2 LBS

## 2025-04-09 DIAGNOSIS — I10 ESSENTIAL HYPERTENSION: Primary | ICD-10-CM

## 2025-04-09 DIAGNOSIS — F41.9 ANXIETY: ICD-10-CM

## 2025-04-09 PROCEDURE — 3079F DIAST BP 80-89 MM HG: CPT | Performed by: NURSE PRACTITIONER

## 2025-04-09 PROCEDURE — G8419 CALC BMI OUT NRM PARAM NOF/U: HCPCS | Performed by: NURSE PRACTITIONER

## 2025-04-09 PROCEDURE — 1159F MED LIST DOCD IN RCRD: CPT | Performed by: NURSE PRACTITIONER

## 2025-04-09 PROCEDURE — 1090F PRES/ABSN URINE INCON ASSESS: CPT | Performed by: NURSE PRACTITIONER

## 2025-04-09 PROCEDURE — G2211 COMPLEX E/M VISIT ADD ON: HCPCS | Performed by: NURSE PRACTITIONER

## 2025-04-09 PROCEDURE — 1123F ACP DISCUSS/DSCN MKR DOCD: CPT | Performed by: NURSE PRACTITIONER

## 2025-04-09 PROCEDURE — 3017F COLORECTAL CA SCREEN DOC REV: CPT | Performed by: NURSE PRACTITIONER

## 2025-04-09 PROCEDURE — G8399 PT W/DXA RESULTS DOCUMENT: HCPCS | Performed by: NURSE PRACTITIONER

## 2025-04-09 PROCEDURE — 3077F SYST BP >= 140 MM HG: CPT | Performed by: NURSE PRACTITIONER

## 2025-04-09 PROCEDURE — G8427 DOCREV CUR MEDS BY ELIG CLIN: HCPCS | Performed by: NURSE PRACTITIONER

## 2025-04-09 PROCEDURE — 99214 OFFICE O/P EST MOD 30 MIN: CPT | Performed by: NURSE PRACTITIONER

## 2025-04-09 PROCEDURE — 1036F TOBACCO NON-USER: CPT | Performed by: NURSE PRACTITIONER

## 2025-04-09 RX ORDER — HYDROXYZINE HYDROCHLORIDE 50 MG/1
50 TABLET, FILM COATED ORAL EVERY 8 HOURS PRN
Qty: 30 TABLET | Refills: 0 | Status: SHIPPED | OUTPATIENT
Start: 2025-04-09 | End: 2025-04-19

## 2025-04-09 RX ORDER — ESCITALOPRAM OXALATE 20 MG/1
20 TABLET ORAL DAILY
Qty: 90 TABLET | Refills: 1 | Status: SHIPPED | OUTPATIENT
Start: 2025-04-09

## 2025-04-09 NOTE — PROGRESS NOTES
4/9/25     Chief Complaint   Patient presents with    2 Week Follow-Up     HTN       History of Present Illness  The patient presents for evaluation of blood pressure and anxiety.    Home blood pressure readings have been consistently better than those recorded in the clinic, attributed to the efficacy of her current medication regimen. Blood pressure has been running in the 120s/70s. She is currently on losartan and hydrochlorothiazide and reports no adverse effects from these medications.    Increased stress and anxiety at home are reported, contributing to elevated blood pressure. She has been taking escitalopram daily, which is found beneficial, but feels that anxiety is less controlled with the new medication. Consideration is given to increasing the dosage of escitalopram. Hydroxyzine was taken yesterday as needed and found helpful.    Kidney function and electrolytes were checked and reported as good following the addition of hydrochlorothiazide.    Allergies   Allergen Reactions    Metoprolol Nausea Only       Current Outpatient Medications   Medication Sig Dispense Refill    hydrOXYzine HCl (ATARAX) 50 MG tablet Take 1 tablet by mouth every 8 hours as needed for Anxiety 30 tablet 0    escitalopram (LEXAPRO) 20 MG tablet Take 1 tablet by mouth daily 90 tablet 1    hydroCHLOROthiazide 12.5 MG capsule Take 1 capsule by mouth every morning 90 capsule 1    losartan (COZAAR) 100 MG tablet Take 1 tablet by mouth daily 90 tablet 1    atorvastatin (LIPITOR) 40 MG tablet Take 1 tablet by mouth daily 90 tablet 1    vitamin D (ERGOCALCIFEROL) 1.25 MG (67722 UT) CAPS capsule Take 1 capsule by mouth once a week Weekly for four months 12 capsule 3    fluticasone (FLONASE) 50 MCG/ACT nasal spray 1 spray by Each Nostril route daily 16 g 0     No current facility-administered medications for this visit.       Review of Systems  Negative other than HPI     Results  Labs   - Kidney function: Normal   - Electrolytes: Normal

## 2025-04-09 NOTE — ASSESSMENT & PLAN NOTE
Chronic, improving but not at goal.   - Experiencing increased stress and anxiety at home, requiring occasional use of hydroxyzine.  - Currently taking escitalopram daily and finds it helpful but could be better  - Dosage of escitalopram will be increased to 20 mg, to be achieved by taking 2 tablets until the current supply is exhausted, followed by a new prescription for 20 mg tablets.  - Hydroxyzine will continue to be used as needed. If any side effects from the increased dosage of escitalopram occur, she should inform the clinic for dosage adjustment.    Orders:    hydrOXYzine HCl (ATARAX) 50 MG tablet; Take 1 tablet by mouth every 8 hours as needed for Anxiety    escitalopram (LEXAPRO) 20 MG tablet; Take 1 tablet by mouth daily

## 2025-04-09 NOTE — ASSESSMENT & PLAN NOTE
Chronic. Stable.   - Blood pressure readings have shown improvement at home, likely due to the addition of hydrochlorothiazide.  - elevated initially, improving with recheck..  -  Ambulatory blood pressure is under appropriate control.  Pt reports longstanding whitecoat hypertension.  Based on ambulatory blood pressure levels pt will continue with current antihypertensive regimen and continue to monitor BP at home following up in office if not at goal.   - BMP earlier this week within normal limits, indicating no adverse effects from the medication.  - Advised to maintain adequate hydration and monitor blood pressure intermittently at home.

## 2025-05-21 ENCOUNTER — OFFICE VISIT (OUTPATIENT)
Dept: INTERNAL MEDICINE CLINIC | Age: 70
End: 2025-05-21
Payer: MEDICARE

## 2025-05-21 VITALS
DIASTOLIC BLOOD PRESSURE: 86 MMHG | SYSTOLIC BLOOD PRESSURE: 158 MMHG | WEIGHT: 173.4 LBS | OXYGEN SATURATION: 99 % | HEIGHT: 67 IN | BODY MASS INDEX: 27.21 KG/M2 | HEART RATE: 66 BPM

## 2025-05-21 DIAGNOSIS — F41.9 ANXIETY: ICD-10-CM

## 2025-05-21 DIAGNOSIS — E55.9 VITAMIN D DEFICIENCY: ICD-10-CM

## 2025-05-21 DIAGNOSIS — E78.2 MIXED HYPERLIPIDEMIA: ICD-10-CM

## 2025-05-21 DIAGNOSIS — I10 ESSENTIAL HYPERTENSION: ICD-10-CM

## 2025-05-21 PROCEDURE — 99214 OFFICE O/P EST MOD 30 MIN: CPT | Performed by: NURSE PRACTITIONER

## 2025-05-21 PROCEDURE — G8399 PT W/DXA RESULTS DOCUMENT: HCPCS | Performed by: NURSE PRACTITIONER

## 2025-05-21 PROCEDURE — 1036F TOBACCO NON-USER: CPT | Performed by: NURSE PRACTITIONER

## 2025-05-21 PROCEDURE — 1090F PRES/ABSN URINE INCON ASSESS: CPT | Performed by: NURSE PRACTITIONER

## 2025-05-21 PROCEDURE — G8419 CALC BMI OUT NRM PARAM NOF/U: HCPCS | Performed by: NURSE PRACTITIONER

## 2025-05-21 PROCEDURE — 1123F ACP DISCUSS/DSCN MKR DOCD: CPT | Performed by: NURSE PRACTITIONER

## 2025-05-21 PROCEDURE — G8427 DOCREV CUR MEDS BY ELIG CLIN: HCPCS | Performed by: NURSE PRACTITIONER

## 2025-05-21 PROCEDURE — 3077F SYST BP >= 140 MM HG: CPT | Performed by: NURSE PRACTITIONER

## 2025-05-21 PROCEDURE — 3017F COLORECTAL CA SCREEN DOC REV: CPT | Performed by: NURSE PRACTITIONER

## 2025-05-21 PROCEDURE — G2211 COMPLEX E/M VISIT ADD ON: HCPCS | Performed by: NURSE PRACTITIONER

## 2025-05-21 PROCEDURE — 1159F MED LIST DOCD IN RCRD: CPT | Performed by: NURSE PRACTITIONER

## 2025-05-21 PROCEDURE — 3079F DIAST BP 80-89 MM HG: CPT | Performed by: NURSE PRACTITIONER

## 2025-05-21 RX ORDER — HYDROXYZINE HYDROCHLORIDE 25 MG/1
25 TABLET, FILM COATED ORAL EVERY 8 HOURS PRN
Qty: 30 TABLET | Refills: 0 | Status: SHIPPED | OUTPATIENT
Start: 2025-05-21 | End: 2025-05-31

## 2025-05-21 RX ORDER — ATORVASTATIN CALCIUM 40 MG/1
40 TABLET, FILM COATED ORAL DAILY
Qty: 90 TABLET | Refills: 1 | Status: SHIPPED | OUTPATIENT
Start: 2025-05-21

## 2025-05-21 RX ORDER — LOSARTAN POTASSIUM 100 MG/1
100 TABLET ORAL DAILY
Qty: 90 TABLET | Refills: 1 | Status: SHIPPED | OUTPATIENT
Start: 2025-05-21

## 2025-05-21 NOTE — ASSESSMENT & PLAN NOTE
Chronic, not currently on weekly supplement   Starting once daily supplement based on most recent labs.     Orders:    vitamin D (CHOLECALCIFEROL) 25 MCG (1000 UT) TABS tablet; Take 1 tablet by mouth daily

## 2025-05-21 NOTE — ASSESSMENT & PLAN NOTE
Chronic.   - Prescribe atorvastatin, send to pharmacy  - Dose increased in 01/2025 based on labs    Orders:    atorvastatin (LIPITOR) 40 MG tablet; Take 1 tablet by mouth daily

## 2025-05-21 NOTE — PROGRESS NOTES
5/21/25     Chief Complaint   Patient presents with    Follow-up     6 weeks for mood and HTN       History of Present Illness  The patient presents for evaluation of generalized anxiety disorder, essential hypertension, and hyperlipidemia.    Generalized Anxiety Disorder  - The patient recently obtained escitalopram but has not commenced its use.  - Previously, the patient was on sertraline, which was discontinued in January 2025 due to adverse effects including cognitive impairment and memory disturbances.  - The patient's anxiety has been manageable without the use of escitalopram over the past week.  - Currently, the patient is not taking either lexapro or sertraline and utilizes hydroxyzine on an as-needed basis for anxiety management.    Essential Hypertension  - The patient is compliant with a hydrochlorothiazide regimen for hypertension but has discontinued losartan due to not getting refills from the pharmacy   - Home blood pressure readings have been recorded at 130-150/ mmHg.  - The patient administers the antihypertensive medication in the afternoon and monitors blood pressure at home after taking medications.     Hyperlipidemia  - The patient is on atorvastatin for hyperlipidemia, with the dosage increased in January 2025.    Supplemental information: Additionally, the patient has been supplementing with vitamin D for the past 2-4 months.    Supplemental information: The patient uses a nasal spray infrequently.    Allergies   Allergen Reactions    Metoprolol Nausea Only       Current Outpatient Medications   Medication Sig Dispense Refill    losartan (COZAAR) 100 MG tablet Take 1 tablet by mouth daily 90 tablet 1    atorvastatin (LIPITOR) 40 MG tablet Take 1 tablet by mouth daily 90 tablet 1    vitamin D (CHOLECALCIFEROL) 25 MCG (1000 UT) TABS tablet Take 1 tablet by mouth daily 90 tablet 1    hydrOXYzine HCl (ATARAX) 25 MG tablet Take 1 tablet by mouth every 8 hours as needed for Anxiety 30 tablet 0

## 2025-05-21 NOTE — ASSESSMENT & PLAN NOTE
Chronic, uncontrolled.   Restart losartan 100 mg daily   Continue hctz 12.5 mg daily   Bring BP log to appt in 2 weeks     Orders:    losartan (COZAAR) 100 MG tablet; Take 1 tablet by mouth daily

## 2025-05-21 NOTE — ASSESSMENT & PLAN NOTE
- restart daily escitalopram 20 mg  - Use hydroxyzine as needed  - previously discontinued sertraline due to brain fog and forgetfulness  - Provided comprehensive medication list    Orders:    hydrOXYzine HCl (ATARAX) 25 MG tablet; Take 1 tablet by mouth every 8 hours as needed for Anxiety

## 2025-06-04 ENCOUNTER — OFFICE VISIT (OUTPATIENT)
Dept: INTERNAL MEDICINE CLINIC | Age: 70
End: 2025-06-04
Payer: MEDICARE

## 2025-06-04 VITALS
DIASTOLIC BLOOD PRESSURE: 76 MMHG | OXYGEN SATURATION: 99 % | BODY MASS INDEX: 26.93 KG/M2 | HEIGHT: 67 IN | SYSTOLIC BLOOD PRESSURE: 142 MMHG | WEIGHT: 171.6 LBS | HEART RATE: 70 BPM

## 2025-06-04 DIAGNOSIS — E55.9 VITAMIN D DEFICIENCY: ICD-10-CM

## 2025-06-04 DIAGNOSIS — I10 ESSENTIAL HYPERTENSION: Primary | ICD-10-CM

## 2025-06-04 DIAGNOSIS — F41.9 ANXIETY: ICD-10-CM

## 2025-06-04 PROCEDURE — G8427 DOCREV CUR MEDS BY ELIG CLIN: HCPCS | Performed by: NURSE PRACTITIONER

## 2025-06-04 PROCEDURE — G8419 CALC BMI OUT NRM PARAM NOF/U: HCPCS | Performed by: NURSE PRACTITIONER

## 2025-06-04 PROCEDURE — 3078F DIAST BP <80 MM HG: CPT | Performed by: NURSE PRACTITIONER

## 2025-06-04 PROCEDURE — 1159F MED LIST DOCD IN RCRD: CPT | Performed by: NURSE PRACTITIONER

## 2025-06-04 PROCEDURE — 1090F PRES/ABSN URINE INCON ASSESS: CPT | Performed by: NURSE PRACTITIONER

## 2025-06-04 PROCEDURE — G8399 PT W/DXA RESULTS DOCUMENT: HCPCS | Performed by: NURSE PRACTITIONER

## 2025-06-04 PROCEDURE — 3077F SYST BP >= 140 MM HG: CPT | Performed by: NURSE PRACTITIONER

## 2025-06-04 PROCEDURE — 1123F ACP DISCUSS/DSCN MKR DOCD: CPT | Performed by: NURSE PRACTITIONER

## 2025-06-04 PROCEDURE — 1036F TOBACCO NON-USER: CPT | Performed by: NURSE PRACTITIONER

## 2025-06-04 PROCEDURE — G2211 COMPLEX E/M VISIT ADD ON: HCPCS | Performed by: NURSE PRACTITIONER

## 2025-06-04 PROCEDURE — 99214 OFFICE O/P EST MOD 30 MIN: CPT | Performed by: NURSE PRACTITIONER

## 2025-06-04 PROCEDURE — 3017F COLORECTAL CA SCREEN DOC REV: CPT | Performed by: NURSE PRACTITIONER

## 2025-06-04 RX ORDER — AMLODIPINE BESYLATE 5 MG/1
5 TABLET ORAL DAILY
Qty: 30 TABLET | Refills: 0 | Status: SHIPPED | OUTPATIENT
Start: 2025-06-04

## 2025-06-04 ASSESSMENT — PATIENT HEALTH QUESTIONNAIRE - PHQ9
SUM OF ALL RESPONSES TO PHQ QUESTIONS 1-9: 0
2. FEELING DOWN, DEPRESSED OR HOPELESS: NOT AT ALL
SUM OF ALL RESPONSES TO PHQ QUESTIONS 1-9: 0
1. LITTLE INTEREST OR PLEASURE IN DOING THINGS: NOT AT ALL

## 2025-06-04 NOTE — ASSESSMENT & PLAN NOTE
Chronic, improving, not at goa.   Continue losartan 100 mg daily and hctz 12.5 mg daily   Bring BP log to appt in 2 weeks   - Blood pressure readings improved but remain suboptimal.  - Losartan and hydrochlorothiazide beneficial but may be insufficient.  - Advised to limit caffeine and salt intake, increase water consumption, and monitor blood pressure one hour post-medication.  - Prescribed amlodipine 5 mg once daily, to be taken in the morning with first meal or drink. If stomach upset occurs, eat something small.

## 2025-06-04 NOTE — ASSESSMENT & PLAN NOTE
Chronic, stable.   - Well-managed without additional medication.  - Experienced brain fog and forgetfulness with sertraline, switched to escitalopram, which caused nausea and vomiting upon reinitiation.  - Continue hydroxyzine as needed for anxiety.  - No replacement for escitalopram as anxiety is well-controlled.

## 2025-06-04 NOTE — ASSESSMENT & PLAN NOTE
- Did not receive previous prescription for vitamin D, possibly due to cost.  - Advised to purchase vitamin D3 1000 IU over the counter and take once daily.  - Pharmacist can assist in selecting an appropriate product.  - Vitamin D3 1000 IU once daily noted on paperwork.

## 2025-06-04 NOTE — PROGRESS NOTES
Electronically signed by IVIS Thakur CNP on 6/4/2025 at 12:09 PM    The patient (or guardian, if applicable) and other individuals in attendance with the patient were advised that BUFFY copilot will be utilized during this visit to record, process the conversation to generate a clinical note, and support improvement of the technology. The patient (or guardian, if applicable) and other individuals in attendance at the appointment consented to the use of BUFFY, including the recording.

## 2025-06-13 ENCOUNTER — HOSPITAL ENCOUNTER (INPATIENT)
Age: 70
LOS: 13 days | Discharge: HOME HEALTH | DRG: 057 | End: 2025-06-26
Attending: STUDENT IN AN ORGANIZED HEALTH CARE EDUCATION/TRAINING PROGRAM | Admitting: STUDENT IN AN ORGANIZED HEALTH CARE EDUCATION/TRAINING PROGRAM
Payer: MEDICARE

## 2025-06-13 DIAGNOSIS — E78.2 MIXED HYPERLIPIDEMIA: ICD-10-CM

## 2025-06-13 DIAGNOSIS — I63.9 ACUTE CEREBROVASCULAR ACCIDENT (HCC): Primary | ICD-10-CM

## 2025-06-13 PROCEDURE — 6370000000 HC RX 637 (ALT 250 FOR IP): Performed by: STUDENT IN AN ORGANIZED HEALTH CARE EDUCATION/TRAINING PROGRAM

## 2025-06-13 PROCEDURE — 1280000000 HC REHAB R&B

## 2025-06-13 RX ORDER — ONDANSETRON 4 MG/1
4 TABLET, ORALLY DISINTEGRATING ORAL EVERY 8 HOURS PRN
Status: DISCONTINUED | OUTPATIENT
Start: 2025-06-13 | End: 2025-06-26 | Stop reason: HOSPADM

## 2025-06-13 RX ORDER — FLUTICASONE PROPIONATE 50 MCG
1 SPRAY, SUSPENSION (ML) NASAL DAILY PRN
Status: DISCONTINUED | OUTPATIENT
Start: 2025-06-13 | End: 2025-06-26 | Stop reason: HOSPADM

## 2025-06-13 RX ORDER — HYDROXYZINE HYDROCHLORIDE 25 MG/1
25 TABLET, FILM COATED ORAL EVERY 8 HOURS PRN
COMMUNITY

## 2025-06-13 RX ORDER — ENOXAPARIN SODIUM 100 MG/ML
40 INJECTION SUBCUTANEOUS DAILY
Status: DISCONTINUED | OUTPATIENT
Start: 2025-06-14 | End: 2025-06-26 | Stop reason: HOSPADM

## 2025-06-13 RX ORDER — BISACODYL 10 MG
10 SUPPOSITORY, RECTAL RECTAL DAILY PRN
Status: DISCONTINUED | OUTPATIENT
Start: 2025-06-13 | End: 2025-06-26 | Stop reason: HOSPADM

## 2025-06-13 RX ORDER — SENNA AND DOCUSATE SODIUM 50; 8.6 MG/1; MG/1
2 TABLET, FILM COATED ORAL DAILY
Status: DISCONTINUED | OUTPATIENT
Start: 2025-06-14 | End: 2025-06-22

## 2025-06-13 RX ORDER — ATORVASTATIN CALCIUM 80 MG/1
80 TABLET, FILM COATED ORAL NIGHTLY
Status: DISCONTINUED | OUTPATIENT
Start: 2025-06-13 | End: 2025-06-26 | Stop reason: HOSPADM

## 2025-06-13 RX ORDER — VALSARTAN 80 MG/1
80 TABLET ORAL DAILY
Status: DISCONTINUED | OUTPATIENT
Start: 2025-06-14 | End: 2025-06-13

## 2025-06-13 RX ORDER — ACETAMINOPHEN 325 MG/1
650 TABLET ORAL EVERY 4 HOURS PRN
Status: DISCONTINUED | OUTPATIENT
Start: 2025-06-13 | End: 2025-06-26 | Stop reason: HOSPADM

## 2025-06-13 RX ORDER — LOSARTAN POTASSIUM 25 MG/1
100 TABLET ORAL DAILY
Status: DISCONTINUED | OUTPATIENT
Start: 2025-06-14 | End: 2025-06-26 | Stop reason: HOSPADM

## 2025-06-13 RX ORDER — HYDROXYZINE HYDROCHLORIDE 25 MG/1
25 TABLET, FILM COATED ORAL 3 TIMES DAILY PRN
Status: DISCONTINUED | OUTPATIENT
Start: 2025-06-13 | End: 2025-06-26 | Stop reason: HOSPADM

## 2025-06-13 RX ORDER — AMLODIPINE BESYLATE 5 MG/1
5 TABLET ORAL DAILY
Status: DISCONTINUED | OUTPATIENT
Start: 2025-06-14 | End: 2025-06-26 | Stop reason: HOSPADM

## 2025-06-13 RX ORDER — VITAMIN B COMPLEX
1000 TABLET ORAL DAILY
Status: DISCONTINUED | OUTPATIENT
Start: 2025-06-14 | End: 2025-06-26 | Stop reason: HOSPADM

## 2025-06-13 RX ORDER — ASPIRIN 81 MG/1
81 TABLET, CHEWABLE ORAL DAILY
Status: DISCONTINUED | OUTPATIENT
Start: 2025-06-14 | End: 2025-06-26 | Stop reason: HOSPADM

## 2025-06-13 RX ORDER — POLYETHYLENE GLYCOL 3350 17 G/17G
17 POWDER, FOR SOLUTION ORAL DAILY PRN
Status: DISCONTINUED | OUTPATIENT
Start: 2025-06-13 | End: 2025-06-26 | Stop reason: HOSPADM

## 2025-06-13 RX ADMIN — ONDANSETRON 4 MG: 4 TABLET, ORALLY DISINTEGRATING ORAL at 20:36

## 2025-06-13 RX ADMIN — ATORVASTATIN CALCIUM 80 MG: 80 TABLET, FILM COATED ORAL at 20:36

## 2025-06-13 NOTE — CARE COORDINATION
External Admission Medical Chart Summary For :  Vanessa Kimball  (All information taken from discharging hospital medical chart)    HPI:    69 year old female with past medical history of pituitary tumor status post resection, hypertension, hyperlipidemia who presented to [facility] 6/9 with acute onset right sided weakness and dysarthria. She received TNK for presumed acute ischemic stroke and was transferred to NSICU. MRI confirmed suspected CVA- acute bilobed left pontine infarct. Cardiology was consulted, Neurology was consulted. TTE with EF of 60-65%, no PFO, normal LV wall motion. On 6/12 per MD notes- Neuroexam was stable. PT/OT evaluations were completed and found her to have Impaired;Insight Impaired;Problem Solving Impaired gait, Impaired Functional mobility, Impaired aerobic capacity/endurance associated with deconditioning, Impaired balance, Impaired strength and impaired safety. SLP found her to have mild dysarthria, mild expressive and cognitive deficits.     Therapy was suggested to continue in an inpatient setting prior to returning home.  A referral was made to [facility] and medical record was reviewed. PM&R MD found the patient is an appropriate ARU candidate, whose stay on the ARU is reasonable and necessary. She would like to come to ARU upon discharge and is able to tolerate the required 3 hours of therapy per day.  Previously independent and living with her daughter. Anticipated transfer to ARU today 6/13.       Physician A&P at discharge from previous hospital:    Assessment & Plan  Acute ischemic stroke (HCC)  Hemiparesis affecting dominant side as late effect of stroke (HCC)  Initial NIHSS 2. S/p TNK. 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

## 2025-06-13 NOTE — PLAN OF CARE
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 Left pontinue ischemic stroke  #. Right hemiparesis, dysarthria due to above  - CT Head demonstrated no acute abnormality.   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   - MRI Brain demonstrated acute left pontine infarct.   - TTE showed LVEF 60-65%, no evidence of PFO.   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   Plan:  - 30 day cardiac event monitor  - ASA, high-dose statin  - BP control. Avoid hypotension.   - Monitor for emerging spasticity. Encourage passive joint ROM.   - Continue PT/OT/SLP     #. HTN  - Continue CCB, ARB w/ hold parameters.     #. Anxiety  - Atarax PRN    ADMIT DATE:2025    Patient Goals: To get better, improve right side, and improve slurred speech.  Admitting Impairments: Stroke - 1.2 - Right Body Involvement (Left Brain)  Activities: Impaired Eating, Hygiene, Toileting, Bathing, Dressing, Bed Mobility, Transfers, Ambulation, Stairs, and Endurance.  Participation: Prior to admission patient was living at home with daughter, was independent with all mobility and activities, was an active .     CARE PLAN     NURSING:  Vanessa Kimball while on this                                Short Term Goals  Goal 1: Patient will be instructed in memory strategies to utilize in order to promote recall of newly learned information with >80% min cues.  Goal 2: Pt will implement speech strategies to increase speech intelligibility to 90% during short discourse (3 min) as rated by pt.  Goal 3: Pt will complete moderately challenging executive functioning tasks involving money/ medication management with 80% accuracy independently to > independence in iADLs.  Goal 4: Pt will complete short term memory tasks and recall fx information for improved recall and carryover of information from day to day.                     Plan of Care:  Pt to be seen 5 out of 7 days per week per ARU protocol ( 30 minutes with SLP)    Therapy Treatments will include:  [x]  therapeutic exercises    [x]  gait training     [x]  neuromuscular re-ed                            [x]  transfer training             [x] community reintegration    [x] bed mobility                          []  w/c mobility and training  [x]  self care    [x]home mgmt    [x]  cognitive training            [x]  energy conservation        []  dysphagia tx    [x]  speech/language/communication therapy   [x]  group therapy    [x]  patient/family education    [] Other:    CASE MANAGEMENT:  Goals:  Assist patient/family with discharge planning, patient/family counseling, and coordination with insurance during ARU stay.       Vanessa Kimball will be seen a minimum of 3 hours of therapy per day, a minimum of 5 out of 7 days per week  (please see above for specific treatment plan per PT/OT/SLP).    [] In this rare instance due to the nature of this patient's medical involvement, this patient will be seen 15 hours per week (900 minutes within a 7 day period).    In addition, dietician/nutritionist may monitor calorie count as well as intake and collaboratively work with SLP on dietary upgrades.  Neuropsychology/Psychology may evaluate and

## 2025-06-13 NOTE — PROGRESS NOTES
ARU Admission Assessment    Ethnicity  \"Are you of , /a, or [place]an origin?\"  Check all that apply:  [x] A.  No, not of , /a, or [place] Origin  [] B.  Yes, Citizen of [place], Citizen of [place] American, Chicano/a  [] C.  Yes, South Korean  [] D.  Yes, James  [] E.  Yes, another , , or [place]an origin  [] X.  Patient unable to respond  [] Y.  Patient declines to respond    Race  \"What is your race?\"  Check all that apply:  [] A.  White  [x] B.  Black or   [] C.   or   [] D.   Surinamese  [] E.  Chinese  [] F.  Norwegian  [] G. Austrian  [] H.  Syriac  [] I.  Khmer  [] J.  Other   [] K.    [] L.  Greek or Harjit  [] M.  Tuvaluan  [] N.  Other   [] X.  Patient unable to respond  [] Y.  Patient declines to respond  [] Z.  None of the above    Language  A.  \"What is your preferred language?\"   English    B.  \"Do you need or want an  to communicate with a doctor or health care staff?\"  Check only one:  [x] 0.  No  [] 1.  Yes  [] 9.  Unable to determine    Transportation  \"Has lack of transportation kept you from medical appointments, meetings, work, or from getting things needed for daily living?\"Check all that apply:  [] A.  Yes, it has kept me from medical appointments or from getting my medications  [] B.  Yes, it has kept me from non-medical meetings, appointments, work, or from getting things that I need  [x] C.  No  [] X.  Patient unable to respond  [] Y.  Patient declines to respond    Hearing  Ability to hear (with hearing aid or hearing appliances if normally used)  [x]  0.  Adequate - no difficulty in normal conversation, social interaction, listening to TV  []  1.  Minimal difficulty - difficulty in some environments (e.g. when person speaks softly or setting is noisy)  []  2.  Moderate difficulty - speaker has to increase volume and speak distinctly   []  3.  Highly impaired - absence of  score 0-27, or enter 99 if unable to complete (if symptom frequency (column 2) is blank for 3 or more items).   0     Social Isolation  \"How often do you feel lonely or isolated from those around you?\"  [x] 0.  Never  [] 1.  Rarely  [] 2.  Sometimes  [] 3.  Often  [] 4.  Always  [] 7.  Patient declines to respond  [] 8.  Patient unable to respond    Pain Effect on Sleep  \"Over the past 5 days, how much of the time has pain made it hard for you to sleep at night?\"  [x]  0.  Does not apply - I have not had any pain or hurting in the past 5 days  []  1.  Rarely or not at all  []  2.  Occasionally  []  3.  Frequently  []  4.  Almost constantly  []  8.  Unable to answer    **If the patient answers \"0.  Does not apply\" to this question, skip the next two \"Pain Effect...\" questions**    Pain Interference with Therapy Activities  \"Over the past 5 days, how often have you limited your participation in rehabilitation therapy sessions due to pain?\"  []  0.  Does not apply - I have not received rehabilitation therapy in the past 5 days  []  1.  Rarely or not at all  []  2.  Occasionally  []  3.  Frequently  []  4.  Almost constantly  []  8.  Unable to answer    Pain Interference with Day-to-Day Activities:  \"Over the past 5 days, how often have you limited your day-to-day activities (excluding rehabilitation therapy session)?\"  []  1.  Rarely or not at all  []  2.  Occasionally  []  3.  Frequently  []  4.  Almost constantly  []  8.  Unable to answer    Nutritional Approaches  Check all of the following nutritional approaches that apply on admission:  []  A.  Parenteral/IV feeding (including IV fluids if needed for hydration, but not as part of dialysis/chemo)  []  B.  Feeding tube (e.g., nasogastric or abdominal (PEG))  []  C.  Mechanically altered diet - requires change in texture of food or liquids (e.g., pureed food, thickened liquids)  []  D.  Therapeutic diet (e.g., low salt, diabetic, low cholesterol)  [x]  Z.  None of  and updated as necessary, and are accurate for the admission assessment period.    Assessing/Reviewing RN: Jagruti Castorena    Assessing/Reviewing RN:

## 2025-06-13 NOTE — PROGRESS NOTES
Patient was admitted to room 4902 at 1300.  Patient was oriented to the Call Light, Phone, TV, Thermostat, Bed Controls, Bathroom and Emergency Cord.  Patient verbalized and demonstrated understanding of all.  Patient was also given an overview of Unit Routines for Acute Rehab, including the patient's rights and responsibilities as well as the CMS IRF KOMAL Privacy Act Statement providing notice of data collection.  Patient states that their normal bowel regime is daily. Meal times were explained, including how to order food.  The white board, (which is posted on the wall by the door is used for communication) has the Therapy Scheduled that is posted each day along with the name of your doctor, nurse, and therapist for your convenience.  We recommend any family that will be care givers or any care givers the patient has, take part in therapy.  We have no set visiting hours, we suggest non-caregiver friends and family visitors come after therapy (at 4 PM or later) to allow patient to rest in between sessions.      In conjunction with the patient and patient’s family, this nurse worked to establish a tailored Fall TIPS plan to ensure patient safety and compliance:    Falls TIPS Completion    Patient identified as increased risk for harm if fall:  [x] Yes     Fall Risks  History of Falls:    [x] Yes   Medication Side Effects:   [] Yes   Walking Aid:    [x] Yes   IV Pole or Equipment:   [] Yes   Unsteady Walk:     [x] Yes   May Forget or Choose Not to Call: [x] Yes     Fall Interventions   Communicate Recent Fall and/or Risk of Harm: [] Yes   Walking Aids:  Crutches: [] Yes   Cane: [] Yes   Walker: [x] Yes   IV Assistance When Walking: [] Yes   Toileting Schedule: Every 2 Hours  Bedpan:   [] Yes   Assist to Commode: [] Yes   Assist to Bathroom: [x] Yes   Bed Alarm On: [] Yes   Assistance Out of Bed:  Bedrest: [] Yes   1 Person: [x] Yes   2 People: [] Yes

## 2025-06-13 NOTE — H&P
Patient: Vanessa Kimball  [id number]  Date: 6/13/2025      Chief Complaint: Acute ischemic stroke    History of Present Illness/Hospital Course:  Vanessa Kimball is a 69 y.o. female with PMHx notable for HTN, HLD, pituitary tumor s/p resection who presented to [facility] on 6/9 with right sided weakness. Initial NIHSS 2. CT Head demonstrated no acute abnormality. 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 MRI Brain demonstrated acute left pontine infarct. TTE showed LVEF 60-65%, no evidence of PFO. Etiology suspected embolic via right carotid artery given trigeminal connection perfusing brainstem.     Currently patient reports that she is doing well. She is still quite weak on her right side. She is also having mild dysarthria. Notes some numbness, but no painful paresthesias on paretic side. Denies any spasticity. Denies any headaches, vision changes, hearing loss, dysphagia. Sleep has been tough in the hospital but she thinks her bed here is more comfortable already. Denies any depression or anxiety. Denies bowel/bladder dysfunction.     Prior Level of Function:  Independent for mobility, self care, IADLs.   Lives with daughter in multi-level home with 9 TASIA w/ no handrail.     Current Level of Function:      Functional Deficits:  Right hemiparesis, dysarthria limiting independence with functional mobility and self care.      has a past medical history of Acute cerebrovascular accident (HCC), Allergic rhinitis, Chicken pox, GERD (gastroesophageal reflux disease), Hypertension, Kidney stone, Pituitary adenoma (HCC), Pituitary microadenoma (HCC), and Uterine fibroid.     has a past surgical history that includes Hysterectomy; Total abdominal hysterectomy w/ bilateral salpingoophorectomy; pituitary surgery (2013); cyst removal (Left, 09/24/2013); Nasal sinus surgery (N/A, 07/01/2020); craniotomy (N/A, 07/01/2020); and  be  able to tolerate the above course of treatment at an intensive level for a  reasonable period of time. I will be completing a detailed individualized  Plan of Care for this patient by day four of the patients stay based upon the  Preadmission Screen, this Post-Admission Evaluation, and the therapy  evaluations.       Rehabilitation Diagnosis:   IGC: Stroke, 1.2, Right Body (L Brain)    Assessment and Plan:  #. Left pontinue ischemic stroke  #. Right hemiparesis, dysarthria due to above  - CT Head demonstrated no acute abnormality.   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   - MRI Brain demonstrated acute left pontine infarct.   - TTE showed LVEF 60-65%, no evidence of PFO.   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   Plan:  - 30 day cardiac event monitor  - ASA, high-dose statin  - BP control. Avoid hypotension.   - Monitor for emerging spasticity. Encourage passive joint ROM.   - Continue PT/OT/SLP    #. HTN  - Continue CCB, ARB w/ hold parameters.    #. Anxiety  - Atarax PRN    CODE: Full Code  Diet: ADULT DIET; Regular  Bowels: Senna 2 tabs daily, Miralax PRN, suppository PRN  Bladder: Bladder scans per ARU protocol  Sleep: Melatonin PRN  Pain: Tylenol PRN   DVT PPx: Lovenox  Dispo: TBD  Services: TBD  DME: TBD  Follow-ups: Neurology, PCP    Tai Doty MD 6/13/2025, 1:42 PM     * This document was created using dictation software.  While all precautions were taken to ensure accuracy, errors may have occurred.  Please disregard any typographical errors.

## 2025-06-13 NOTE — PROGRESS NOTES
4 Eyes Skin Assessment     NAME:  Vanessa Kimball  YOB: 1955  MEDICAL RECORD NUMBER:  [id number]    The patient is being assessed for  Admission    I agree that at least one RN has performed a thorough Head to Toe Skin Assessment on the patient. ALL assessment sites listed below have been assessed.      Areas assessed by both nurses:    Head, Face, Ears, Shoulders, Back, Chest, Arms, Elbows, Hands, Sacrum. Buttock, Coccyx, Ischium, Legs. Feet and Heels, and Under Medical Devices         Does the Patient have a Wound? No noted wound(s)       Eric Prevention initiated by RN: no  Wound Care Orders initiated by RN: No    Pressure Injury (Stage 3,4, Unstageable, DTI, NWPT, and Complex wounds) if present, place Wound referral order by RN under : No    New Ostomies, if present place, Ostomy referral order under : No     Nurse 1 eSignature: Electronically signed by Jagruti Castorena RN on 6/13/25 at 5:31 PM EDT    **SHARE this note so that the co-signing nurse can place an eSignature**    Nurse 2 eSignature: Electronically signed by Alok Lubin RN on 6/13/25 at 5:40 PM EDT

## 2025-06-13 NOTE — PROGRESS NOTES
Bed alarm rang.  Noted the pt sitting at the edge of bed.  Informed the pt about fall risk, call light, and bed/chair alarm.  The pt stated \"I don't want to bother anyone.\"  Informed the pt how the stroke affected her body for high risk of fall.  The pt verbalized understanding.

## 2025-06-14 LAB
ANION GAP SERPL CALCULATED.3IONS-SCNC: 12 MMOL/L (ref 3–16)
BASOPHILS # BLD: 0.1 K/UL (ref 0–0.2)
BASOPHILS NFR BLD: 1.6 %
BUN SERPL-MCNC: 15 MG/DL (ref 7–20)
CALCIUM SERPL-MCNC: 9.6 MG/DL (ref 8.3–10.6)
CHLORIDE SERPL-SCNC: 103 MMOL/L (ref 99–110)
CO2 SERPL-SCNC: 24 MMOL/L (ref 21–32)
CREAT SERPL-MCNC: 0.9 MG/DL (ref 0.6–1.2)
DEPRECATED RDW RBC AUTO: 14.6 % (ref 12.4–15.4)
EOSINOPHIL # BLD: 0.2 K/UL (ref 0–0.6)
EOSINOPHIL NFR BLD: 2.3 %
GFR SERPLBLD CREATININE-BSD FMLA CKD-EPI: 70 ML/MIN/{1.73_M2}
GLUCOSE SERPL-MCNC: 103 MG/DL (ref 70–99)
HCT VFR BLD AUTO: 35.5 % (ref 36–48)
HGB BLD-MCNC: 12.1 G/DL (ref 12–16)
LYMPHOCYTES # BLD: 2.7 K/UL (ref 1–5.1)
LYMPHOCYTES NFR BLD: 31 %
MCH RBC QN AUTO: 30.7 PG (ref 26–34)
MCHC RBC AUTO-ENTMCNC: 34 G/DL (ref 31–36)
MCV RBC AUTO: 90.3 FL (ref 80–100)
MONOCYTES # BLD: 0.7 K/UL (ref 0–1.3)
MONOCYTES NFR BLD: 7.5 %
NEUTROPHILS # BLD: 5.1 K/UL (ref 1.7–7.7)
NEUTROPHILS NFR BLD: 57.6 %
PLATELET # BLD AUTO: 398 K/UL (ref 135–450)
PMV BLD AUTO: 8 FL (ref 5–10.5)
POTASSIUM SERPL-SCNC: 4.4 MMOL/L (ref 3.5–5.1)
RBC # BLD AUTO: 3.93 M/UL (ref 4–5.2)
SODIUM SERPL-SCNC: 139 MMOL/L (ref 136–145)
WBC # BLD AUTO: 8.8 K/UL (ref 4–11)

## 2025-06-14 PROCEDURE — 97166 OT EVAL MOD COMPLEX 45 MIN: CPT

## 2025-06-14 PROCEDURE — 97162 PT EVAL MOD COMPLEX 30 MIN: CPT

## 2025-06-14 PROCEDURE — 85025 COMPLETE CBC W/AUTO DIFF WBC: CPT

## 2025-06-14 PROCEDURE — 97535 SELF CARE MNGMENT TRAINING: CPT

## 2025-06-14 PROCEDURE — 92610 EVALUATE SWALLOWING FUNCTION: CPT

## 2025-06-14 PROCEDURE — 80048 BASIC METABOLIC PNL TOTAL CA: CPT

## 2025-06-14 PROCEDURE — 1280000000 HC REHAB R&B

## 2025-06-14 PROCEDURE — 92523 SPEECH SOUND LANG COMPREHEN: CPT

## 2025-06-14 PROCEDURE — 97112 NEUROMUSCULAR REEDUCATION: CPT

## 2025-06-14 PROCEDURE — 97116 GAIT TRAINING THERAPY: CPT

## 2025-06-14 PROCEDURE — 6370000000 HC RX 637 (ALT 250 FOR IP): Performed by: STUDENT IN AN ORGANIZED HEALTH CARE EDUCATION/TRAINING PROGRAM

## 2025-06-14 PROCEDURE — 97530 THERAPEUTIC ACTIVITIES: CPT

## 2025-06-14 PROCEDURE — 6360000002 HC RX W HCPCS: Performed by: STUDENT IN AN ORGANIZED HEALTH CARE EDUCATION/TRAINING PROGRAM

## 2025-06-14 PROCEDURE — 36415 COLL VENOUS BLD VENIPUNCTURE: CPT

## 2025-06-14 RX ADMIN — ASPIRIN 81 MG: 81 TABLET, CHEWABLE ORAL at 09:32

## 2025-06-14 RX ADMIN — Medication 1000 UNITS: at 09:31

## 2025-06-14 RX ADMIN — LOSARTAN POTASSIUM 100 MG: 25 TABLET, FILM COATED ORAL at 09:32

## 2025-06-14 RX ADMIN — ENOXAPARIN SODIUM 40 MG: 100 INJECTION SUBCUTANEOUS at 09:31

## 2025-06-14 RX ADMIN — ATORVASTATIN CALCIUM 80 MG: 80 TABLET, FILM COATED ORAL at 21:16

## 2025-06-14 RX ADMIN — AMLODIPINE BESYLATE 5 MG: 5 TABLET ORAL at 09:32

## 2025-06-14 RX ADMIN — STANDARDIZED SENNA CONCENTRATE AND DOCUSATE SODIUM 2 TABLET: 8.6; 5 TABLET ORAL at 09:32

## 2025-06-14 NOTE — CONSULTS
Nutrition Note    RECOMMENDATIONS  PO Diet: Continue current diet  ONS: Pt declined at this time    ASSESSMENT   Pt triggered for new ARU admission. Suffered acute ischemic stroke. Upon visiting, reported no issues with appetite or po intake prior to hospital admission. UBW of 170 to 180 lb. No recent significant wt change noted in wt hx in EMR. Denied need for ONS at this time. RD will monitor for adequate po intake.     Malnutrition Status: No malnutrition    NUTRITION DIAGNOSIS   Increased nutrient needs related to increase demand for energy/nutrients as evidenced by rehab for strength and conditioning    Goals: PO intake 50% or greater, by next RD assessment     NUTRITION RELATED FINDINGS  Objective: LBM 6/13  Wounds: None    CURRENT NUTRITION THERAPIES  ADULT DIET; Regular       PO Intake: Unable to assess   PO Supplement Intake:None Ordered    ANTHROPOMETRICS  Current Height: 170.2 cm (5' 7\")  Current Weight - Scale: 76 kg (167 lb 8.8 oz)    Admission weight: 76 kg (167 lb 8.8 oz)    COMPARATIVE STANDARDS  Total Energy Requirements (kcals/day): 3995-4225     Protein (g):  76-91       Fluid (mL/day):  1859    EDUCATION  Education/Counseling not indicated     The patient will be monitored per nutrition standards of care. Consult dietitian if additional nutrition interventions are needed prior to RD reassessment.     Vivian Enriquez, MS, RD, LD    Weekend Contact: [phone]. Please leave name and callback number.

## 2025-06-14 NOTE — PLAN OF CARE
Problem: Chronic Conditions and Co-morbidities  Goal: Patient's chronic conditions and co-morbidity symptoms are monitored and maintained or improved  Outcome: Progressing  Flowsheets (Taken 6/13/2025 2019)  Care Plan - Patient's Chronic Conditions and Co-Morbidity Symptoms are Monitored and Maintained or Improved:   Monitor and assess patient's chronic conditions and comorbid symptoms for stability, deterioration, or improvement   Collaborate with multidisciplinary team to address chronic and comorbid conditions and prevent exacerbation or deterioration   Update acute care plan with appropriate goals if chronic or comorbid symptoms are exacerbated and prevent overall improvement and discharge     Problem: Safety - Adult  Goal: Free from fall injury  Outcome: Progressing

## 2025-06-14 NOTE — PROGRESS NOTES
[facility] - Inpatient Rehabilitation Department   Phone: [phone]    Occupational Therapy    [x] Initial Evaluation            [] Daily Treatment Note         [] Discharge Summary      Patient: Vanessa Kimball   : 1955   MRN: [id number]   Date of Service:  2025    Admitting Diagnosis:  Acute cerebrovascular accident (HCC)  Current Admission Summary: Vanessa Kimball is a 69 y.o. female with PMHx notable for HTN, HLD, pituitary tumor s/p resection who presented to [facility] on  with right sided weakness. Initial NIHSS 2. CT Head demonstrated no acute abnormality. 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 MRI Brain demonstrated acute left pontine infarct. TTE showed LVEF 60-65%, no evidence of PFO. 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  Past Surgical History:  has a past surgical history that includes Hysterectomy; Total abdominal hysterectomy w/ bilateral salpingoophorectomy; pituitary surgery (); cyst removal (Left, 2013); Nasal sinus surgery (N/A, 2020); craniotomy (N/A, 2020); and brain surgery.    Discharge Recommendations: HH OT    DME Required For Discharge: DME to be determined pending patient progress    Precautions/Restrictions: high fall risk  Weight Bearing Restrictions: no restrictions       Required Braces/Orthotics: no braces required    Positional Restrictions:no positional restrictions    Pre-Admission Information   Lives With: daughter    Type of Home: house  Home Layout: two level,  8 + 7 steps between levels with one HR.

## 2025-06-14 NOTE — PROGRESS NOTES
[facility] - Inpatient Rehabilitation Department   Phone: [phone]    Speech Therapy   Initial Evaluation     Patient: Vanessa Kimball   : 1955   MRN: [id number]   Date of Service:  2025  Admitting Diagnosis: Acute cerebrovascular accident (HCC)  Current Admission Summary: Vanessa Kimball is a 69 y.o. female with PMHx notable for HTN, HLD, pituitary tumor s/p resection who presented to [facility] on  with right sided weakness. MRI Brain demonstrated acute left pontine infarct.   Past Medical History:  has a past medical history of Acute cerebrovascular accident (HCC), Allergic rhinitis, Chicken pox, GERD (gastroesophageal reflux disease), Hypertension, Kidney stone, Pituitary adenoma (HCC), Pituitary microadenoma (HCC), and Uterine fibroid.  Past Surgical History:  has a past surgical history that includes Hysterectomy; Total abdominal hysterectomy w/ bilateral salpingoophorectomy; pituitary surgery (); cyst removal (Left, 2013); Nasal sinus surgery (N/A, 2020); craniotomy (N/A, 2020); and brain surgery.  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  Absent or markedly hypoplastic caudal basilar   artery and small bilateral distal vertebral arteries appearing to end in the anterior inferior cerebellar arteries.  No intracranial arterial occlusion or high-grade stenosis identified.    MRI Brain    Acute bilobed nonhemorrhagic left pontine infarct.   Precautions/Restrictions: high fall risk      Pre-Admission Information   Living Status: lives with daughter  Occupation/School: graduated high school,  for [employer]  Medication Management: :  [x]Primary   []Secondary []No  Finance Management: [x]Primary    []Secondary []No  Active :   [x]Yes         []No  Hearing:    WFL  Vision:    Vision Corrective Device: wears glasses at all times      Subjective  General: Pt upright in chair agreeable to tx this date. [place] through session pt was brought tray which had eggs (pt allergy) pt was able to state she is allergic and cannot consume. SLP notified RN, tray removed and requested new tray without eggs.  Pain: Did not state    Safety Interventions: patient left in chair, chair alarm in place, and call light within reach      Dysphagia Bedside Swallow Evaluation     Prior Dysphagia History: Pt denies being treated for dysphagia prior to admission at [facility]. Per chart review pt treated for dysphagia at [facility] although was signed off on 6/10/2025 as pt was tolerating current diet level.  Patient Complaint: pt denies concerns with swallowing at this time  Patient Positioning:   Upright in chair  Respiratory Status:   Room air  Pre-Evaluation Consistency Recommendation:   Regular texture diet  with Thin liquids    Oral Mechanism Exam:   Impairment Severity:Within functional limits      Dentition:   Adequate dentition   Oral Hygiene:   Clean   Baseline Vocal Quality:   normal   Volitional Cough:   strong  Volitional Swallow:   Adequate   Oral Phase Characteristics:   Impairment Severity:Within functional limits   Pharyngeal Phase Characteristics:   Impairment Severity:Within functional limits   Signs and Symptoms of Aspiration:    None   Effective Modifications:    None   Dysphagia risk factors:   co-morbidities, CVA  Risk factors for developing adverse outcomes to aspiration:   Impaired health status  Eating Assistance:   Independent     Clinical Dysphagia Assessment:   Pt presents with WFL oral phase and pharyngeal phase of swallowing. Oral motor exam revealed WFL lingual, labial, and buccal ROM and coordination. Dentition was adequate for mastication. Laryngeal function assessment revealed present volitional    Time In 0800    Time Out 0900    Time Code Minutes 00    Individual Minutes 60      Timed Code Treatment Minutes:  00  Total Treatment Minutes:  60    Electronically Signed By: Windy Matt, SLP

## 2025-06-14 NOTE — PLAN OF CARE
Problem: Safety - Adult  Goal: Free from fall injury  6/14/2025 1221 by Renetta De La Cruz RN  Outcome: Progressing  Note: Pt remains free from falls.  Safety precautions in place.  Bed in lowest position, bed/chair wheels locked, call light with in reach, bedside table in reach, bed/chair alarm on, fall risk wrist band on.

## 2025-06-14 NOTE — PROGRESS NOTES
[facility] - Inpatient Rehabilitation Department   Phone: [phone]    Physical Therapy    [x] Initial Evaluation            [] Daily Treatment Note         [] Discharge Summary      Patient: Vanessa Kimball   : 1955   MRN: [id number]   Date of Service:  2025  Admitting Diagnosis: Acute cerebrovascular accident (HCC)  Current Admission Summary: Vanessa Kimball is a 69 y.o. female with PMHx notable for HTN, HLD, pituitary tumor s/p resection who presented to [facility] on  with right sided weakness. MRI Brain demonstrated acute left pontine infarct.   Past Medical History:  has a past medical history of Acute cerebrovascular accident (HCC), Allergic rhinitis, Chicken pox, GERD (gastroesophageal reflux disease), Hypertension, Kidney stone, Pituitary adenoma (HCC), Pituitary microadenoma (HCC), and Uterine fibroid.  Past Surgical History:  has a past surgical history that includes Hysterectomy; Total abdominal hysterectomy w/ bilateral salpingoophorectomy; pituitary surgery (); cyst removal (Left, 2013); Nasal sinus surgery (N/A, 2020); craniotomy (N/A, 2020); and brain surgery.    Discharge Recommendations: HHPT    DME Required For Discharge: DME to be determined pending patient progress, RW vs hemiwalker vs cane  Precautions/Restrictions: high fall risk  Weight Bearing Restrictions: no restrictions  [] Right Upper Extremity  [] Left Upper Extremity [] Right Lower Extremity  [] Left Lower Extremity     Required Braces/Orthotics: no braces required   [] Right  [] Left  Positional Restrictions:no positional restrictions    Pre-Admission Information   Lives With: daughter               Type of Home: house  Home Layout: two level,  8 + 7 steps between levels with one HR. Bed/bath upstairs and 1/2 bath on main level   Home Access: 6 step to enter without rails   Bathroom Layout: tub/shower unit, curtain   Bathroom Equipment: tub transfer bench  Toilet Height:  training, transfer training, gait training, stair training, endurance training, neuromuscular re-education, wheelchair mobility training, modalities, patient/caregiver education, pain management, home exercise program, safety education, and equipment evaluation/education    Goals  Patient Goals: to get better   Short Term Goals:  Time Frame: 2-3 weeks  Patient will complete bed mobility at modified independent   Patient will complete transfers at [facility]   Patient will ambulate 150 ft with use of LRAD at [facility]  Patient will ascend/descend 12 stairs with (R) ascending handrail at modified independent  Patient will complete car transfer at [facility]  Pt will ascend/descend 6 steps without HR with LRAD at [facility]    Above goals reviewed on 6/14/2025.  All goals are ongoing at this time unless indicated above.      Therapy Session Time      Individual Group Co-treatment   Time In 1245       Time Out 1345       Minutes 60         Timed Code Treatment Minutes:   45  Total Treatment Minutes:  60       Electronically Signed By: Barbara Moscoso, PT  Barbara Moscoso PT, DPT [id number]

## 2025-06-15 VITALS
OXYGEN SATURATION: 96 % | WEIGHT: 167.55 LBS | HEART RATE: 78 BPM | BODY MASS INDEX: 26.3 KG/M2 | HEIGHT: 67 IN | DIASTOLIC BLOOD PRESSURE: 76 MMHG | SYSTOLIC BLOOD PRESSURE: 122 MMHG | RESPIRATION RATE: 16 BRPM | TEMPERATURE: 98 F

## 2025-06-15 PROCEDURE — 6370000000 HC RX 637 (ALT 250 FOR IP): Performed by: STUDENT IN AN ORGANIZED HEALTH CARE EDUCATION/TRAINING PROGRAM

## 2025-06-15 PROCEDURE — 6360000002 HC RX W HCPCS: Performed by: STUDENT IN AN ORGANIZED HEALTH CARE EDUCATION/TRAINING PROGRAM

## 2025-06-15 PROCEDURE — 1280000000 HC REHAB R&B

## 2025-06-15 RX ADMIN — STANDARDIZED SENNA CONCENTRATE AND DOCUSATE SODIUM 2 TABLET: 8.6; 5 TABLET ORAL at 08:12

## 2025-06-15 RX ADMIN — Medication 1000 UNITS: at 08:12

## 2025-06-15 RX ADMIN — ENOXAPARIN SODIUM 40 MG: 100 INJECTION SUBCUTANEOUS at 08:12

## 2025-06-15 RX ADMIN — AMLODIPINE BESYLATE 5 MG: 5 TABLET ORAL at 08:12

## 2025-06-15 RX ADMIN — ASPIRIN 81 MG: 81 TABLET, CHEWABLE ORAL at 08:12

## 2025-06-15 RX ADMIN — ATORVASTATIN CALCIUM 80 MG: 80 TABLET, FILM COATED ORAL at 21:15

## 2025-06-15 NOTE — PLAN OF CARE
Problem: Chronic Conditions and Co-morbidities  Goal: Patient's chronic conditions and co-morbidity symptoms are monitored and maintained or improved  Outcome: Progressing  Flowsheets (Taken 6/14/2025 2121)  Care Plan - Patient's Chronic Conditions and Co-Morbidity Symptoms are Monitored and Maintained or Improved:   Monitor and assess patient's chronic conditions and comorbid symptoms for stability, deterioration, or improvement   Collaborate with multidisciplinary team to address chronic and comorbid conditions and prevent exacerbation or deterioration   Update acute care plan with appropriate goals if chronic or comorbid symptoms are exacerbated and prevent overall improvement and discharge     Problem: Safety - Adult  Goal: Free from fall injury  6/14/2025 2333 by Katy Haynes, RN  Outcome: Progressing  6/14/2025 1221 by Renetta De La Cruz, RN  Outcome: Progressing  Note: Pt remains free from falls.  Safety precautions in place.  Bed in lowest position, bed/chair wheels locked, call light with in reach, bedside table in reach, bed/chair alarm on, fall risk wrist band on.     Problem: Nutrition Deficit:  Goal: Optimize nutritional status  Outcome: Progressing

## 2025-06-15 NOTE — PLAN OF CARE
Problem: Safety - Adult  Goal: Free from fall injury  6/15/2025 0950 by Renetta De La Cruz RN  Outcome: Progressing  Note: Pt remains free from falls.  Safety precautions in place.  Bed in lowest position, bed/chair wheels locked, call light with in reach, bedside table in reach, bed/chair alarm on, fall risk wrist band on.

## 2025-06-16 LAB
ANION GAP SERPL CALCULATED.3IONS-SCNC: 10 MMOL/L (ref 3–16)
BASOPHILS # BLD: 0.1 K/UL (ref 0–0.2)
BASOPHILS NFR BLD: 1.2 %
BUN SERPL-MCNC: 14 MG/DL (ref 7–20)
CALCIUM SERPL-MCNC: 9.5 MG/DL (ref 8.3–10.6)
CHLORIDE SERPL-SCNC: 104 MMOL/L (ref 99–110)
CO2 SERPL-SCNC: 25 MMOL/L (ref 21–32)
CREAT SERPL-MCNC: 0.8 MG/DL (ref 0.6–1.2)
DEPRECATED RDW RBC AUTO: 14.6 % (ref 12.4–15.4)
EOSINOPHIL # BLD: 0.3 K/UL (ref 0–0.6)
EOSINOPHIL NFR BLD: 3.6 %
GFR SERPLBLD CREATININE-BSD FMLA CKD-EPI: 80 ML/MIN/{1.73_M2}
GLUCOSE SERPL-MCNC: 102 MG/DL (ref 70–99)
HCT VFR BLD AUTO: 34.5 % (ref 36–48)
HGB BLD-MCNC: 11.4 G/DL (ref 12–16)
LYMPHOCYTES # BLD: 2.8 K/UL (ref 1–5.1)
LYMPHOCYTES NFR BLD: 35.1 %
MCH RBC QN AUTO: 30.6 PG (ref 26–34)
MCHC RBC AUTO-ENTMCNC: 33.1 G/DL (ref 31–36)
MCV RBC AUTO: 92.3 FL (ref 80–100)
MONOCYTES # BLD: 0.7 K/UL (ref 0–1.3)
MONOCYTES NFR BLD: 8.5 %
NEUTROPHILS # BLD: 4.1 K/UL (ref 1.7–7.7)
NEUTROPHILS NFR BLD: 51.6 %
PLATELET # BLD AUTO: 370 K/UL (ref 135–450)
PMV BLD AUTO: 7.9 FL (ref 5–10.5)
POTASSIUM SERPL-SCNC: 4.5 MMOL/L (ref 3.5–5.1)
RBC # BLD AUTO: 3.73 M/UL (ref 4–5.2)
SODIUM SERPL-SCNC: 139 MMOL/L (ref 136–145)
WBC # BLD AUTO: 7.9 K/UL (ref 4–11)

## 2025-06-16 PROCEDURE — 97530 THERAPEUTIC ACTIVITIES: CPT

## 2025-06-16 PROCEDURE — 36415 COLL VENOUS BLD VENIPUNCTURE: CPT

## 2025-06-16 PROCEDURE — 97116 GAIT TRAINING THERAPY: CPT

## 2025-06-16 PROCEDURE — 6360000002 HC RX W HCPCS: Performed by: STUDENT IN AN ORGANIZED HEALTH CARE EDUCATION/TRAINING PROGRAM

## 2025-06-16 PROCEDURE — 97535 SELF CARE MNGMENT TRAINING: CPT

## 2025-06-16 PROCEDURE — 97112 NEUROMUSCULAR REEDUCATION: CPT

## 2025-06-16 PROCEDURE — 6370000000 HC RX 637 (ALT 250 FOR IP): Performed by: STUDENT IN AN ORGANIZED HEALTH CARE EDUCATION/TRAINING PROGRAM

## 2025-06-16 PROCEDURE — 1280000000 HC REHAB R&B

## 2025-06-16 PROCEDURE — 97129 THER IVNTJ 1ST 15 MIN: CPT

## 2025-06-16 PROCEDURE — 92507 TX SP LANG VOICE COMM INDIV: CPT

## 2025-06-16 PROCEDURE — 85025 COMPLETE CBC W/AUTO DIFF WBC: CPT

## 2025-06-16 PROCEDURE — 80048 BASIC METABOLIC PNL TOTAL CA: CPT

## 2025-06-16 PROCEDURE — 97110 THERAPEUTIC EXERCISES: CPT

## 2025-06-16 RX ADMIN — MELATONIN TAB 3 MG 3 MG: 3 TAB at 23:56

## 2025-06-16 RX ADMIN — POLYETHYLENE GLYCOL 3350 17 G: 17 POWDER, FOR SOLUTION ORAL at 15:17

## 2025-06-16 RX ADMIN — ENOXAPARIN SODIUM 40 MG: 100 INJECTION SUBCUTANEOUS at 08:45

## 2025-06-16 RX ADMIN — Medication 1000 UNITS: at 08:44

## 2025-06-16 RX ADMIN — AMLODIPINE BESYLATE 5 MG: 5 TABLET ORAL at 08:44

## 2025-06-16 RX ADMIN — ACETAMINOPHEN 650 MG: 325 TABLET ORAL at 15:17

## 2025-06-16 RX ADMIN — STANDARDIZED SENNA CONCENTRATE AND DOCUSATE SODIUM 2 TABLET: 8.6; 5 TABLET ORAL at 08:44

## 2025-06-16 RX ADMIN — ATORVASTATIN CALCIUM 80 MG: 80 TABLET, FILM COATED ORAL at 20:01

## 2025-06-16 RX ADMIN — ASPIRIN 81 MG: 81 TABLET, CHEWABLE ORAL at 08:45

## 2025-06-16 RX ADMIN — LOSARTAN POTASSIUM 100 MG: 25 TABLET, FILM COATED ORAL at 08:44

## 2025-06-16 ASSESSMENT — PAIN SCALES - WONG BAKER: WONGBAKER_NUMERICALRESPONSE: NO HURT

## 2025-06-16 ASSESSMENT — PAIN SCALES - GENERAL: PAINLEVEL_OUTOF10: 0

## 2025-06-16 NOTE — PROGRESS NOTES
[facility] - Inpatient Rehabilitation Department   Phone: [phone]    Physical Therapy    [] Initial Evaluation            [x] Daily Treatment Note         [] Discharge Summary      Patient: Vanessa Kimball   : 1955   MRN: [id number]   Date of Service:  2025  Admitting Diagnosis: Acute cerebrovascular accident (HCC)  Current Admission Summary: Vanessa Kimball is a 69 y.o. female with PMHx notable for HTN, HLD, pituitary tumor s/p resection who presented to [facility] on  with right sided weakness. MRI Brain demonstrated acute left pontine infarct.   Past Medical History:  has a past medical history of Acute cerebrovascular accident (HCC), Allergic rhinitis, Chicken pox, GERD (gastroesophageal reflux disease), Hypertension, Kidney stone, Pituitary adenoma (HCC), Pituitary microadenoma (HCC), and Uterine fibroid.  Past Surgical History:  has a past surgical history that includes Hysterectomy; Total abdominal hysterectomy w/ bilateral salpingoophorectomy; pituitary surgery (); cyst removal (Left, 2013); Nasal sinus surgery (N/A, 2020); craniotomy (N/A, 2020); and brain surgery.  Discharge Recommendations: HH PT vs OP PT, family assist PRN  DME Required For Discharge: DME to be determined pending patient progress,    Precautions/Restrictions: high fall risk  Weight Bearing Restrictions: no restrictions    Required Braces/Orthotics: no braces required  Positional Restrictions:no positional restrictions    Pre-Admission Information   Lives With: daughter               Type of Home: house  Home Layout: two level,  8 + 7 steps between levels with one HR. Bed/bath upstairs and 1/2 bath on main level   Home Access: 6 step to enter without rails   Bathroom Layout: tub/shower unit, curtain   Bathroom Equipment: tub transfer bench  Toilet Height: standard height  Home Equipment: no prior equipment  Transfer Assistance: Independent without use of device  Ambulation

## 2025-06-16 NOTE — PLAN OF CARE
Problem: Chronic Conditions and Co-morbidities  Goal: Patient's chronic conditions and co-morbidity symptoms are monitored and maintained or improved  Outcome: Progressing  Flowsheets (Taken 6/15/2025 2120)  Care Plan - Patient's Chronic Conditions and Co-Morbidity Symptoms are Monitored and Maintained or Improved:   Monitor and assess patient's chronic conditions and comorbid symptoms for stability, deterioration, or improvement   Collaborate with multidisciplinary team to address chronic and comorbid conditions and prevent exacerbation or deterioration   Update acute care plan with appropriate goals if chronic or comorbid symptoms are exacerbated and prevent overall improvement and discharge     Problem: Safety - Adult  Goal: Free from fall injury  Outcome: Progressing

## 2025-06-16 NOTE — CARE COORDINATION
Case Management Assessment  Initial Evaluation    Date/Time of Evaluation: 6/16/2025 3:14 PM  Assessment Completed by: Shereen AMIN RN    If patient is discharged prior to next notation, then this note serves as note for discharge by case management.    Patient Name: Vanessa Kimball                   YOB: 1955  Diagnosis: Acute cerebrovascular accident (HCC) [I63.9]                   Date / Time: 6/13/2025  1:15 PM    Patient Admission Status: REHAB IP   Readmission Risk (Low < 19, Mod (19-27), High > 27): Readmission Risk Score: 7.6    Current PCP: Consuelo Braxton APRN - CNP  PCP verified by CM? Yes (Consuelo Braxton APRN - CNP)    Chart Reviewed: Yes      History Provided by: Patient  Patient Orientation: Alert and Oriented, Person, Place, Situation, Self    Patient Cognition: Alert    Hospitalization in the last 30 days (Readmission):  No    If yes, Readmission Assessment in CM Navigator will be completed.    Advance Directives:      Code Status: Full Code   Patient's Primary Decision Maker is: Legal Next of Kin (Alicia Kimball (Child)  [phone])    Primary Decision Maker: Alicia Kimball - Child - [phone]    Discharge Planning:    Patient lives with: Children (Alicia Kimball (Child)  [phone]) Type of Home: House  Primary Care Giver: Self  Patient Support Systems include: Children, Family Members (PATIENT SHE LIVES WITH DAUGHTER)   Current Financial resources: Other (Comment) (N/A)  Current community resources: None  Current services prior to admission: None            Current DME:  Will need choice if needed at discharge            Type of Home Care services:  None -will need choice if needed at d/c    ADLS  Prior functional level: Independent in ADLs/IADLs, Bathing, Dressing, Toileting, Feeding, Cooking, Housework, Shopping, Mobility  Current functional level: Assistance with the following:, Mobility, Toileting, Dressing, Bathing    PT AM-PAC:   /24  OT AM-PAC:   /24    Family can

## 2025-06-16 NOTE — PROGRESS NOTES
Balance: good: independent with functional balance in unsupported position  Dynamic Sitting Balance: fair: maintains balance at CGA without use of UE support  Static Standing Balance: fair (-): maintains balance at CGA with use of UE support  Dynamic Standing Balance: poor (+): requires min (A) to maintain balance  Comments:    Other Therapeutic Interventions    Pt completes R UE AROM and grasp activity in stance to elevated table to address functional use and strength: prior seated pt completes 5 reps x1 with 5 sec holds of B scap elevations and B scap retractions. In stance pt completes vertical arm glides w/ R hand on wash cloth (min cues/assist for reducing compensatory movements) x10 and horizontal/cross body reach R<>L x10; pt then completes grasp/stack of 2 cones, cross midline using R hand only. Min A to complete and increased time, x4 reps each side (pt noted to utilize finger tips most often rather than mass grasp.        Second Session    Pt in bed at entry, sleeping. Easy to wake. Agreeable to session. Reports fatigue, ADL needs. Declined. Orthostatic BP assessed per RN request (see flow sheet for details, BP stable seated and in stance). All BADL needs declined     Supine > sit SBA w/ increased time.     Sit<>stands at CGA throughout. Pt ambulates w/o AD room <> therapy room requiring CGA w/ periods of Rosy d/t mild LOB and R knee partial buckling, path deviations and variable R foot clearance.      Seated in chair pt completes (B) UE ROM for strengthening, coordination, and functional reach/grasp of R UE: holding sensory ball pt completes (b) elbow flexion, chest press, shoulder flex to 90, trunk rotation to R/L, and alternating pronation/supinations. Rest as needed. Rosy for R UE facilitation, cues for reduced R shoulder hiking throughout. Assist to re- R hand as needed; resistive push/pulls with R UE w/ resistance applied by therapist, loose  but active digit flexion 2-4 w/ resistance and  requires skilled OT services in order to maximize IND/safety with all occupational pursuits and reduce caregiver burden.   Safety Interventions: patient left in bed, bed alarm in place, call light within reach, and patient at risk for falls    Plan  Frequency: 5 x/week, 75 min/day  Current Treatment Recommendations: strengthening, ROM, balance training, functional mobility training, transfer training, endurance training, neuromuscular re-education, patient/caregiver education, ADL/self-care training, IADL training, home management training, home exercise program, safety education, equipment evaluation/education, and positioning    Goals  Patient Goals: improve the right side   Short Term Goals:  Time Frame: 2-3 Weeks   Patient will complete upper body ADL at modified independent   Patient will complete lower body ADL at [facility] independent   Patient will complete toileting at modified independent   Patient will complete self-feeding at [facility] independent   Patient will complete grooming at [facility] independent   Patient will complete functional transfers at [facility] independent   Patient to gather and transport IADL items at modified independent     Above goals reviewed on 6/16/2025.  All goals are ongoing at this time unless indicated above.       Therapy Session Time     Individual Group Co-treatment   Time In 0730      Time Out 0815      Minutes 45         Second Session Therapy Time:   Individual Concurrent Group Co-treatment   Time In  1245         Time Out  1315         Minutes  30           Timed Code Treatment Minutes:   45 +30  Total Treatment Minutes:  75       Electronically Signed By: HAYLEY Scales OTR/IVON #[id number]

## 2025-06-16 NOTE — PROGRESS NOTES
[facility] - Inpatient Rehabilitation Department   Phone: [phone]    Speech Therapy   Daily Treatment Note     Patient: Vanessa Kimball   : 1955   MRN: [id number]   Date of Service:  2025  Admitting Diagnosis: Acute cerebrovascular accident (HCC)  Current Admission Summary: Vanessa Kimball is a 69 y.o. female with PMHx notable for HTN, HLD, pituitary tumor s/p resection who presented to [facility] on  with right sided weakness. MRI Brain demonstrated acute left pontine infarct.   Past Medical History:  has a past medical history of Acute cerebrovascular accident (HCC), Allergic rhinitis, Chicken pox, GERD (gastroesophageal reflux disease), Hypertension, Kidney stone, Pituitary adenoma (HCC), Pituitary microadenoma (HCC), and Uterine fibroid.  Past Surgical History:  has a past surgical history that includes Hysterectomy; Total abdominal hysterectomy w/ bilateral salpingoophorectomy; pituitary surgery (); cyst removal (Left, 2013); Nasal sinus surgery (N/A, 2020); craniotomy (N/A, 2020); and brain surgery.  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  Absent or markedly hypoplastic caudal basilar   artery and small bilateral distal vertebral arteries appearing to end in the anterior inferior cerebellar arteries.  No intracranial arterial occlusion or high-grade stenosis identified.    MRI Brain    Acute bilobed nonhemorrhagic left pontine infarct.   Precautions/Restrictions: high fall risk      Pre-Admission Information   Living Status: lives with daughter  Occupation/School: graduated high school,  for [employer]  Medication Management: :  [x]Primary   []Secondary []No  Finance

## 2025-06-16 NOTE — PLAN OF CARE
Problem: Discharge Planning  Goal: Discharge to home or other facility with appropriate resources  Outcome: Progressing     Problem: Safety - Adult  Goal: Free from fall injury  6/16/2025 1306 by Rachelle Hurtado RN  Outcome: Progressing

## 2025-06-16 NOTE — PATIENT CARE CONFERENCE
[facility] Inpatient Rehabilitation Department  Weekly Team Conference Note    Patient Name: Vanessa Kimball      MRN: [id number]  : 1955  (69 y.o.) Gender: female     The team conference for this patient was held on 2025 at 11 am and was led by:  Tai Doty MD     CASE MANAGEMENT:  Assessment:  Pt is a transfer from [facility] on 25 with righted weakness ;  MRI Brain demonstrated acute left pontine infarct.  Pt is receiving PT / OT  / ST  ;  PT is recommending contact guard assist ; OT is recommending contact guard assistance  ; ST  is recommending Regular texture diet.    Pt has an active PCP ; Consuelo Braxton APRN - CNP.  Prior level of function Independent for mobility, self care, IADLs. Lives with daughter in multi-level home.   DCP is home with [facility] for PT OT and nursing ;  DME TBD  closer to discharge.  Pt has Medicare insurance.      PHYSICAL THERAPY    Current Status:  Transfers:  Sit to stand transfer: contact guard assistance  Stand to sit transfer: contact guard assistance  Stand step transfer: minimal assistance  Comments: Above transfers completed without device.  VC/TC for increased functional use of (R) UE/LE within tasks.  In addition patient completes stand step transfer with RW at [employer].  Ambulation Trial 1:  Surface:level surface  Assistive Device: rolling walker  Other Appliance: (R), dorsiflex assist  Assistance: contact guard assistance  Distance: 70' x 2 completions  Gait Mechanics: Reciprocal pattern with reduced charity and poor (R) LE foot clearance.  (R) LE reduced knee flexion, mild (R) circumduction, and inconsistent stride length  Comments: No evidence of knee hyperextention within ambulation trials  Ambulation Trial 2:  Surface:level surface  Assistive Device: no device  Other Appliance: (R), dorsiflex assist  Assistance: minimal assistance  Distance: 50'  Gait Mechanics: Similar to RW with further reduction in (R) foot clearance resulting in  difficulty with a task. Pt reports she drives and primarily manages her medications and finances. She would benefit from skilled speech services in the areas of cognition and speech to return to [facility].    Goals:  Time Frame: 7-10 days     Patient will be instructed in memory strategies to utilize in order to promote recall of newly learned information with >80% min cues.  Pt will implement speech strategies to increase speech intelligibility to 90% during short discourse (3 min) as rated by pt.  Pt will complete moderately challenging executive functioning tasks involving money/ medication management with 80% accuracy independently to > independence in iADLs.   Pt will complete short term memory tasks and recall fx information for improved recall and carryover of information from day to day.     Above goals reviewed today.  All goals are ongoing at this time unless indicated above.     These goals were reviewed with this patient at the time of assessment and Vanessa Kimball is in agreement.    Plan of Care:  Pt to be seen 5 out of 7 days per week per ARU protocol (30 minutes with SLP)    NUTRITION:  Weight - Scale: 76 kg (167 lb 8.8 oz) / Body mass index is 26.24 kg/m².    Diet:ADULT DIET; Regular    Pt denies appetite issues.  UBW ranges 170-180 lb.  No significant changes at this time.  Con't to monitor & encourage adequate po intake. Please see nutrition note for details.    NURSING:    Risk for Readmission: 8%  Fall Predictive Analysis (FPA): 45  Wounds/Incisions/Ulcers: N/A  Medication Review: reviewed with patient upon medication administration  Pain: managed with and without medication  Consultations/Labs/X-rays:    Consultations: Dietitian 6/14   Labs: CBC with Auto Differential, BMP w/Reflex to MG Q MWF    Patient/Family Education provided by team:    Discharge Plan   Estimated Length of Stay:  9 Days  Destination: home health  Pass:No  Services at Discharge: Home w/ HHOT/PT and as needed assistance from

## 2025-06-16 NOTE — PROGRESS NOTES
Admission Period/Goal QM Codes for Vanessa Kimball.    QM Admit Code Goal Code   Eating 5 6   Oral Hygiene 3 6   Toileting Hygiene 3 6   Shower/Bathing 3 6   UB Dressing 3 6   LB Dressing 2 6   Putting on/off Footwear 3 6   Rolling Left and Right 4 6   Sit To Lying 3 6   Lying to Sitting on Bedside 3 6   Sit to Stand 3 6   Chair/Bed to Chair Transfer 3 6   Toilet Transfers 3 6   Car Transfers 3 6   Walk 10 Feet 3 6   Walk 50 Feet with Two Turns 3 6   Walk 150 Feet 88 6   Walk 10 Feet on Uneven Surfaces 3 6   1 Step (Curb) 3 6   4 Steps 3 6   12 Steps 3 6   Picking up Object from Floor 3 6   Wheel 50 Feet with 2 Turns 9 -   Type - -   Wheel 150 Feet 9 -   Type - -       The above codes were determined by the treatment team to be the patient's accurate admission assessment codes based on assessment performed soon after the patient's admission and prior to the benefit of services provided by staff, or if appropriate, the patient's usual performance during the admission assessment period.    OT: RO Nelson OTR/L, [id number] 6/18/2025 11:40 AM     PT:  Roberto Gar PT, DPT - [id number], 6/17/2025 3:14 PM     RN:  Rachelle Hurtado RN 6/17/25 1212     ST:  Cielo Pelaez M.A. CCC-SLP #[id number] 6/17/25, 1723     :  Sanjay Negrete PT, DPT [id number]  6/18/25  3:05 PM

## 2025-06-16 NOTE — PROGRESS NOTES
Vanessa Kimball  6/16/2025  [id number]    Chief Complaint: Acute cerebrovascular accident (HCC)    Subjective    No acute events overnight.     Patient reports that she is doing well today.  Nauseous this morning after sitting up, but this is since resolved.  She was able to eat breakfast.  Denies any chest pain, dyspnea, fever/chills.  Last Bowel Movement:  06/13/25          Objective    Patient Vitals for the past 24 hrs:   BP Temp Temp src Pulse Resp SpO2   06/16/25 0719 129/73 97.5 °F (36.4 °C) Oral 67 12 96 %   06/15/25 2057 122/76 98 °F (36.7 °C) Oral 78 16 96 %     Patient Vitals for the past 96 hrs (Last 3 readings):   Weight   06/14/25 0033 76 kg (167 lb 8.8 oz)   06/13/25 1606 74.8 kg (164 lb 14.5 oz)      Gen: No distress.  HEENT: Normocephalic, atraumatic.  CV: Extremities warm, well perfused.   Resp: No respiratory distress. CTAB.  Abd: Soft, nontender.  Ext: No edema.  Neuro: Alert, oriented, appropriately interactive.  Right hemiparesis.    Laboratory data:   Na/K/Cl/CO2:  139/4.5/104/25 (06/16 0610)   BUN/Cr/glu/ALT/AST/amyl/lip:  14/0.8/--/--/--/--/-- (06/16 0610)    WBC/Hgb/Hct/Plts:  7.9/11.4/34.5/370 (06/16 0610)     Therapy progress:       PT    Supine to Sit: Partial/moderate assistance  Sit to Supine: Partial/moderate assistance   Sit to Stand: Partial/moderate assistance  Chair/Bed to Chair Transfer: Partial/moderate assistance  Car Transfer: Partial/moderate assistance  Ambulation 10 ft: Partial/moderate assistance  Ambulation 50 ft: Partial/moderate assistance  Ambulation 150 ft:    Stairs - 1 Step: Partial/moderate assistance  Stairs - 4 Step: Partial/moderate assistance  Stairs - 12 Step: Partial/moderate assistance    OT    Eating:    Oral Hygiene: Partial/moderate assistance  Bathing: Partial/moderate assistance  Upper Body Dressing: Partial/moderate assistance  Lower Body Dressing: Substantial/maximal assistance  Toilet Transfer: Partial/moderate assistance  Toilet Hygiene:

## 2025-06-17 ENCOUNTER — ANCILLARY PROCEDURE (OUTPATIENT)
Dept: CARDIOLOGY CLINIC | Age: 70
End: 2025-06-17

## 2025-06-17 DIAGNOSIS — I63.9 CEREBROVASCULAR ACCIDENT (CVA), UNSPECIFIED MECHANISM (HCC): Primary | ICD-10-CM

## 2025-06-17 DIAGNOSIS — I63.9 CEREBROVASCULAR ACCIDENT (CVA), UNSPECIFIED MECHANISM (HCC): ICD-10-CM

## 2025-06-17 LAB — ECHO BSA: 1.9 M2

## 2025-06-17 PROCEDURE — 92507 TX SP LANG VOICE COMM INDIV: CPT

## 2025-06-17 PROCEDURE — 97112 NEUROMUSCULAR REEDUCATION: CPT

## 2025-06-17 PROCEDURE — 97530 THERAPEUTIC ACTIVITIES: CPT

## 2025-06-17 PROCEDURE — 1280000000 HC REHAB R&B

## 2025-06-17 PROCEDURE — 97110 THERAPEUTIC EXERCISES: CPT

## 2025-06-17 PROCEDURE — 6370000000 HC RX 637 (ALT 250 FOR IP): Performed by: STUDENT IN AN ORGANIZED HEALTH CARE EDUCATION/TRAINING PROGRAM

## 2025-06-17 PROCEDURE — 97129 THER IVNTJ 1ST 15 MIN: CPT

## 2025-06-17 PROCEDURE — 6360000002 HC RX W HCPCS: Performed by: STUDENT IN AN ORGANIZED HEALTH CARE EDUCATION/TRAINING PROGRAM

## 2025-06-17 PROCEDURE — 97116 GAIT TRAINING THERAPY: CPT

## 2025-06-17 RX ADMIN — STANDARDIZED SENNA CONCENTRATE AND DOCUSATE SODIUM 2 TABLET: 8.6; 5 TABLET ORAL at 08:52

## 2025-06-17 RX ADMIN — ATORVASTATIN CALCIUM 80 MG: 80 TABLET, FILM COATED ORAL at 19:59

## 2025-06-17 RX ADMIN — BISACODYL 10 MG: 10 SUPPOSITORY RECTAL at 19:59

## 2025-06-17 RX ADMIN — Medication 1000 UNITS: at 08:52

## 2025-06-17 RX ADMIN — ENOXAPARIN SODIUM 40 MG: 100 INJECTION SUBCUTANEOUS at 08:52

## 2025-06-17 RX ADMIN — MELATONIN TAB 3 MG 3 MG: 3 TAB at 21:50

## 2025-06-17 RX ADMIN — ASPIRIN 81 MG: 81 TABLET, CHEWABLE ORAL at 08:52

## 2025-06-17 RX ADMIN — LOSARTAN POTASSIUM 100 MG: 25 TABLET, FILM COATED ORAL at 08:52

## 2025-06-17 RX ADMIN — POLYETHYLENE GLYCOL 3350 17 G: 17 POWDER, FOR SOLUTION ORAL at 08:55

## 2025-06-17 RX ADMIN — AMLODIPINE BESYLATE 5 MG: 5 TABLET ORAL at 08:52

## 2025-06-17 RX ADMIN — MAGNESIUM HYDROXIDE 15 ML: 400 SUSPENSION ORAL at 15:09

## 2025-06-17 NOTE — PROGRESS NOTES
Vanessa JOE Jigar  6/17/2025  [id number]    Chief Complaint: Acute cerebrovascular accident (HCC)    Subjective    No acute events overnight.     Patient reports that she is doing well. Denies any headache, chest pain, arthralgia. Had an episode of dizziness and diaphoresis with OT. Last Bowel Movement:  06/13/25 (routine senna given, pt will maybe take PRN miralax after therapies if no BM).           Objective    Patient Vitals for the past 24 hrs:   BP Temp Temp src Pulse Resp SpO2   06/16/25 2000 123/73 98.1 °F (36.7 °C) Oral 69 18 98 %     Patient Vitals for the past 96 hrs (Last 3 readings):   Weight   06/14/25 0033 76 kg (167 lb 8.8 oz)   06/13/25 1606 74.8 kg (164 lb 14.5 oz)      Gen: No distress.  HEENT: Normocephalic, atraumatic.  CV: Extremities warm, well perfused.   Resp: No respiratory distress. CTAB.  Abd: Soft, nontender.  Ext: No edema.  Neuro: Alert, oriented, appropriately interactive.  Right hemiparesis.    Laboratory data:   Na/K/Cl/CO2:  139/4.5/104/25 (06/16 0610)   BUN/Cr/glu/ALT/AST/amyl/lip:  14/0.8/--/--/--/--/-- (06/16 0610)    WBC/Hgb/Hct/Plts:  7.9/11.4/34.5/370 (06/16 0610)     Therapy progress:       PT    Supine to Sit: Partial/moderate assistance  Sit to Supine: Partial/moderate assistance   Sit to Stand: Supervision or touching assistance  Chair/Bed to Chair Transfer: Partial/moderate assistance  Car Transfer: Partial/moderate assistance  Ambulation 10 ft: Partial/moderate assistance  Ambulation 50 ft: Partial/moderate assistance  Ambulation 150 ft:    Stairs - 1 Step: Partial/moderate assistance  Stairs - 4 Step: Partial/moderate assistance  Stairs - 12 Step: Partial/moderate assistance    OT    Eating:    Oral Hygiene: Partial/moderate assistance  Bathing: Partial/moderate assistance  Upper Body Dressing: Partial/moderate assistance  Lower Body Dressing: Substantial/maximal assistance  Toilet Transfer: Partial/moderate assistance  Toilet Hygiene: Partial/moderate assistance    Speech

## 2025-06-17 NOTE — PROGRESS NOTES
[facility] - Inpatient Rehabilitation Department   Phone: [phone]    Occupational Therapy    [] Initial Evaluation            [x] Daily Treatment Note         [] Discharge Summary      Patient: Vanessa Kimball   : 1955   MRN: [id number]   Date of Service:  2025    Admitting Diagnosis:  Acute cerebrovascular accident (HCC)  Current Admission Summary: Vanessa Kimball is a 69 y.o. female with PMHx notable for HTN, HLD, pituitary tumor s/p resection who presented to [facility] on  with right sided weakness. Initial NIHSS 2. CT Head demonstrated no acute abnormality. 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 MRI Brain demonstrated acute left pontine infarct. TTE showed LVEF 60-65%, no evidence of PFO. 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  Past Surgical History:  has a past surgical history that includes Hysterectomy; Total abdominal hysterectomy w/ bilateral salpingoophorectomy; pituitary surgery (); cyst removal (Left, 2013); Nasal sinus surgery (N/A, 2020); craniotomy (N/A, 2020); and brain surgery.    Discharge Recommendations: HH OT    DME Required For Discharge: DME to be determined pending patient progress    Precautions/Restrictions: high fall risk  Weight Bearing Restrictions: no restrictions       Required Braces/Orthotics: no braces required    Positional Restrictions:no positional restrictions    Pre-Admission Information   Lives With: daughter    Type of Home: house  Home Layout: two level,  8 + 7 steps between levels with one HR.

## 2025-06-17 NOTE — PROGRESS NOTES
[facility] - Inpatient Rehabilitation Department   Phone: [phone]    Physical Therapy    [] Initial Evaluation            [x] Daily Treatment Note         [] Discharge Summary      Patient: Vanessa Kimball   : 1955   MRN: [id number]   Date of Service:  2025  Admitting Diagnosis: Acute cerebrovascular accident (HCC)  Current Admission Summary: Vanessa Kimball is a 69 y.o. female with PMHx notable for HTN, HLD, pituitary tumor s/p resection who presented to [facility] on  with right sided weakness. MRI Brain demonstrated acute left pontine infarct.   Past Medical History:  has a past medical history of Acute cerebrovascular accident (HCC), Allergic rhinitis, Chicken pox, GERD (gastroesophageal reflux disease), Hypertension, Kidney stone, Pituitary adenoma (HCC), Pituitary microadenoma (HCC), and Uterine fibroid.  Past Surgical History:  has a past surgical history that includes Hysterectomy; Total abdominal hysterectomy w/ bilateral salpingoophorectomy; pituitary surgery (); cyst removal (Left, 2013); Nasal sinus surgery (N/A, 2020); craniotomy (N/A, 2020); and brain surgery.  Discharge Recommendations: HH PT vs OP PT, family assist PRN  DME Required For Discharge: DME to be determined pending patient progress,    Precautions/Restrictions: high fall risk  Weight Bearing Restrictions: no restrictions    Required Braces/Orthotics: no braces required  Positional Restrictions:no positional restrictions    Pre-Admission Information   Lives With: daughter               Type of Home: house  Home Layout: two level,  8 + 7 steps between levels with one HR. Bed/bath upstairs and 1/2 bath on main level   Home Access: 6 step to enter without rails   Bathroom Layout: tub/shower unit, curtain   Bathroom Equipment: tub transfer bench  Toilet Height: standard height  Home Equipment: no prior equipment  Transfer Assistance: Independent without use of device  Ambulation

## 2025-06-17 NOTE — PLAN OF CARE
Problem: Safety - Adult  Goal: Free from fall injury  6/16/2025 2121 by Rosie Sol RN  Outcome: Progressing  6/16/2025 1306 by Rachelle Hurtado RN  Outcome: Progressing     Problem: Nutrition Deficit:  Goal: Optimize nutritional status  Outcome: Progressing     Problem: Chronic Conditions and Co-morbidities  Goal: Patient's chronic conditions and co-morbidity symptoms are monitored and maintained or improved  Outcome: Progressing     Problem: Discharge Planning  Goal: Discharge to home or other facility with appropriate resources  6/16/2025 2121 by Rosie Sol RN  Outcome: Progressing  6/16/2025 1306 by Rachelle Hurtado RN  Outcome: Progressing

## 2025-06-17 NOTE — PLAN OF CARE
Problem: Discharge Planning  Goal: Discharge to home or other facility with appropriate resources  6/17/2025 1038 by Rachelle Hurtado, RN  Outcome: Progressing     Problem: Safety - Adult  Goal: Free from fall injury  6/17/2025 1038 by Rachelle Hurtado, RN  Outcome: Progressing

## 2025-06-17 NOTE — PROGRESS NOTES
Shift assessment done. All night time medications given per MAR. Patient took all her oral medications whole with water, tolerated well. All fall precautions implemented. All needs attended. Electronically signed by Rosie Sol RN on 6/16/2025 at 9:22 PM

## 2025-06-17 NOTE — PROGRESS NOTES
[facility] - Inpatient Rehabilitation Department   Phone: [phone]    Speech Therapy   Daily Treatment Note     Patient: Vanessa Kimball   : 1955   MRN: [id number]   Date of Service:  2025  Admitting Diagnosis: Acute cerebrovascular accident (HCC)  Current Admission Summary: Vanessa Kimball is a 69 y.o. female with PMHx notable for HTN, HLD, pituitary tumor s/p resection who presented to [facility] on  with right sided weakness. MRI Brain demonstrated acute left pontine infarct.   Past Medical History:  has a past medical history of Acute cerebrovascular accident (HCC), Allergic rhinitis, Chicken pox, GERD (gastroesophageal reflux disease), Hypertension, Kidney stone, Pituitary adenoma (HCC), Pituitary microadenoma (HCC), and Uterine fibroid.  Past Surgical History:  has a past surgical history that includes Hysterectomy; Total abdominal hysterectomy w/ bilateral salpingoophorectomy; pituitary surgery (); cyst removal (Left, 2013); Nasal sinus surgery (N/A, 2020); craniotomy (N/A, 2020); and brain surgery.  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  Absent or markedly hypoplastic caudal basilar   artery and small bilateral distal vertebral arteries appearing to end in the anterior inferior cerebellar arteries.  No intracranial arterial occlusion or high-grade stenosis identified.    MRI Brain    Acute bilobed nonhemorrhagic left pontine infarct.   Precautions/Restrictions: high fall risk      Pre-Admission Information   Living Status: lives with daughter  Occupation/School: graduated high school,  for [employer]  Medication Management: :  [x]Primary   []Secondary []No  Finance  cognition  Patient Education: Provided education regarding role of SLP, results of assessment, recommendations and general speech pathology plan of care.   Learning Assessment: Pt verbalized understanding   Pt requires ongoing learning     Assessment  Impairments Requiring Therapeutic Intervention: Dysarthria  and Cognitive-Linguistic Deficits   Prognosis: good    Clinical Assessment:  Pt presents with mild dysarthria characterized by intermittent increased rate and blended word boundaries (primarily noted with /s/). Pt with good intelligibility despite mild dysarthria observed. Pt with mild-mod cognitive impairment characterized by deficits in short term memory, recall of new information, and deficits in executive functioning. Although pt does have the above deficits pt noted to have good insight to deficits and was able to state when he was having difficulty with a task. Pt reports she drives and primarily manages her medications and finances. She would benefit from skilled speech services in the areas of cognition and speech to return to [facility].    Diet Solids Recommendation:   Liquid Consistency Recommendation:   Recommended Form of Meds:   Regular texture diet     Thin liquids     Meds whole with water or Meds in puree        Recommended Compensatory Swallowing Strategies: Eat or Feed Slowly, Small Bites and Sips , Upright as possible with all PO intake       Plan  Frequency: 30 minutes/day; 5 days per week, as tolerated, until goals met, or discharged from ARU.  Therapeutic Interventions:  Speech / Motor Planning / Voice intervention  and Cognitive-Linguistic intervention     Discharge  Barriers to discharge: May need additional assistance to manage medications and/or finances (assistance/supervision)  Discharge Recommendations: Home with assistance  Continued SLP at Discharge: TBD based upon progress     Goals  Patient Goals: \"to get better\" and \"improve slurred speech\"   Time Frame: 7-10 days    Patient will

## 2025-06-18 LAB
ANION GAP SERPL CALCULATED.3IONS-SCNC: 11 MMOL/L (ref 3–16)
BASOPHILS # BLD: 0.1 K/UL (ref 0–0.2)
BASOPHILS NFR BLD: 1.2 %
BUN SERPL-MCNC: 11 MG/DL (ref 7–20)
CALCIUM SERPL-MCNC: 9.8 MG/DL (ref 8.3–10.6)
CHLORIDE SERPL-SCNC: 105 MMOL/L (ref 99–110)
CO2 SERPL-SCNC: 24 MMOL/L (ref 21–32)
CREAT SERPL-MCNC: 0.7 MG/DL (ref 0.6–1.2)
DEPRECATED RDW RBC AUTO: 14.4 % (ref 12.4–15.4)
EOSINOPHIL # BLD: 0.3 K/UL (ref 0–0.6)
EOSINOPHIL NFR BLD: 3.1 %
GFR SERPLBLD CREATININE-BSD FMLA CKD-EPI: >90 ML/MIN/{1.73_M2}
GLUCOSE SERPL-MCNC: 105 MG/DL (ref 70–99)
HCT VFR BLD AUTO: 34.8 % (ref 36–48)
HGB BLD-MCNC: 11.8 G/DL (ref 12–16)
LYMPHOCYTES # BLD: 2.5 K/UL (ref 1–5.1)
LYMPHOCYTES NFR BLD: 29.7 %
MCH RBC QN AUTO: 30.8 PG (ref 26–34)
MCHC RBC AUTO-ENTMCNC: 34 G/DL (ref 31–36)
MCV RBC AUTO: 90.7 FL (ref 80–100)
MONOCYTES # BLD: 0.7 K/UL (ref 0–1.3)
MONOCYTES NFR BLD: 8.7 %
NEUTROPHILS # BLD: 4.8 K/UL (ref 1.7–7.7)
NEUTROPHILS NFR BLD: 57.3 %
PLATELET # BLD AUTO: 399 K/UL (ref 135–450)
PMV BLD AUTO: 7.8 FL (ref 5–10.5)
POTASSIUM SERPL-SCNC: 4.6 MMOL/L (ref 3.5–5.1)
RBC # BLD AUTO: 3.84 M/UL (ref 4–5.2)
SODIUM SERPL-SCNC: 140 MMOL/L (ref 136–145)
WBC # BLD AUTO: 8.3 K/UL (ref 4–11)

## 2025-06-18 PROCEDURE — 97116 GAIT TRAINING THERAPY: CPT

## 2025-06-18 PROCEDURE — 97530 THERAPEUTIC ACTIVITIES: CPT

## 2025-06-18 PROCEDURE — 80048 BASIC METABOLIC PNL TOTAL CA: CPT

## 2025-06-18 PROCEDURE — 97110 THERAPEUTIC EXERCISES: CPT

## 2025-06-18 PROCEDURE — 6360000002 HC RX W HCPCS: Performed by: STUDENT IN AN ORGANIZED HEALTH CARE EDUCATION/TRAINING PROGRAM

## 2025-06-18 PROCEDURE — 97112 NEUROMUSCULAR REEDUCATION: CPT

## 2025-06-18 PROCEDURE — 97535 SELF CARE MNGMENT TRAINING: CPT

## 2025-06-18 PROCEDURE — 85025 COMPLETE CBC W/AUTO DIFF WBC: CPT

## 2025-06-18 PROCEDURE — 36415 COLL VENOUS BLD VENIPUNCTURE: CPT

## 2025-06-18 PROCEDURE — 1280000000 HC REHAB R&B

## 2025-06-18 PROCEDURE — 6370000000 HC RX 637 (ALT 250 FOR IP): Performed by: STUDENT IN AN ORGANIZED HEALTH CARE EDUCATION/TRAINING PROGRAM

## 2025-06-18 PROCEDURE — 97129 THER IVNTJ 1ST 15 MIN: CPT

## 2025-06-18 PROCEDURE — 97130 THER IVNTJ EA ADDL 15 MIN: CPT

## 2025-06-18 RX ADMIN — STANDARDIZED SENNA CONCENTRATE AND DOCUSATE SODIUM 2 TABLET: 8.6; 5 TABLET ORAL at 10:11

## 2025-06-18 RX ADMIN — Medication 1000 UNITS: at 10:10

## 2025-06-18 RX ADMIN — AMLODIPINE BESYLATE 5 MG: 5 TABLET ORAL at 10:11

## 2025-06-18 RX ADMIN — ATORVASTATIN CALCIUM 80 MG: 80 TABLET, FILM COATED ORAL at 20:25

## 2025-06-18 RX ADMIN — LOSARTAN POTASSIUM 100 MG: 25 TABLET, FILM COATED ORAL at 10:10

## 2025-06-18 RX ADMIN — ENOXAPARIN SODIUM 40 MG: 100 INJECTION SUBCUTANEOUS at 10:10

## 2025-06-18 RX ADMIN — ASPIRIN 81 MG: 81 TABLET, CHEWABLE ORAL at 10:11

## 2025-06-18 NOTE — PROGRESS NOTES
[facility] - Inpatient Rehabilitation Department   Phone: [phone]    Speech Therapy   Daily Treatment Note     Patient: Vanessa Kimball   : 1955   MRN: [id number]   Date of Service:  2025  Admitting Diagnosis: Acute cerebrovascular accident (HCC)  Current Admission Summary: Vanessa Kimball is a 69 y.o. female with PMHx notable for HTN, HLD, pituitary tumor s/p resection who presented to [facility] on  with right sided weakness. MRI Brain demonstrated acute left pontine infarct.   Past Medical History:  has a past medical history of Acute cerebrovascular accident (HCC), Allergic rhinitis, Chicken pox, GERD (gastroesophageal reflux disease), Hypertension, Kidney stone, Pituitary adenoma (HCC), Pituitary microadenoma (HCC), and Uterine fibroid.  Past Surgical History:  has a past surgical history that includes Hysterectomy; Total abdominal hysterectomy w/ bilateral salpingoophorectomy; pituitary surgery (); cyst removal (Left, 2013); Nasal sinus surgery (N/A, 2020); craniotomy (N/A, 2020); and brain surgery.  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  Absent or markedly hypoplastic caudal basilar   artery and small bilateral distal vertebral arteries appearing to end in the anterior inferior cerebellar arteries.  No intracranial arterial occlusion or high-grade stenosis identified.    MRI Brain    Acute bilobed nonhemorrhagic left pontine infarct.   Precautions/Restrictions: high fall risk      Pre-Admission Information   Living Status: lives with daughter  Occupation/School: graduated high school,  for [employer]  Medication Management: :  [x]Primary   []Secondary []No  Finance

## 2025-06-18 NOTE — PROGRESS NOTES
[facility] - Inpatient Rehabilitation Department   Phone: [phone]    Occupational Therapy    [] Initial Evaluation            [x] Daily Treatment Note         [] Discharge Summary      Patient: Vanessa Kimball   : 1955   MRN: [id number]   Date of Service:  2025    Admitting Diagnosis:  Acute cerebrovascular accident (HCC)  Current Admission Summary: Vanessa Kimball is a 69 y.o. female with PMHx notable for HTN, HLD, pituitary tumor s/p resection who presented to [facility] on  with right sided weakness. Initial NIHSS 2. CT Head demonstrated no acute abnormality. 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 MRI Brain demonstrated acute left pontine infarct. TTE showed LVEF 60-65%, no evidence of PFO. 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  Past Surgical History:  has a past surgical history that includes Hysterectomy; Total abdominal hysterectomy w/ bilateral salpingoophorectomy; pituitary surgery (); cyst removal (Left, 2013); Nasal sinus surgery (N/A, 2020); craniotomy (N/A, 2020); and brain surgery.    Discharge Recommendations: HH OT    DME Required For Discharge: DME to be determined pending patient progress    Precautions/Restrictions: high fall risk  Weight Bearing Restrictions: no restrictions       Required Braces/Orthotics: no braces required    Positional Restrictions:no positional restrictions    Pre-Admission Information   Lives With: daughter    Type of Home: house  Home Layout: two level,  8 + 7 steps between levels with one HR.    Patient to gather and transport IADL items at modified independent     Above goals reviewed on 6/18/2025.  All goals are ongoing at this time unless indicated above.       Therapy Session Time     Individual Group Co-treatment   Time In 0917      Time Out 1002      Minutes 45         Second Session Therapy Time:   Individual Concurrent Group Co-treatment   Time In 1325         Time Out 1405         Minutes 40           Timed Code Treatment Minutes: 45+40  Total Treatment Minutes: 85       Electronically Signed By:  RO Nelson OTLYN/L, [id number] 6/18/2025 3:25 PM

## 2025-06-18 NOTE — PROGRESS NOTES
Vanessa Kimball  6/18/2025  [id number]    Chief Complaint: Acute cerebrovascular accident (HCC)    Subjective    No acute events overnight.     Patient reports that she is doing well. Denies any new concerns. No dizziness, nausea today. Last Bowel Movement:  06/17/25.           Objective    Patient Vitals for the past 24 hrs:   BP Temp Temp src Pulse Resp SpO2   06/17/25 1955 125/81 98.3 °F (36.8 °C) Oral 64 17 98 %   06/17/25 0845 112/74 98.6 °F (37 °C) Oral 68 16 98 %     No data found.     Gen: No distress.  HEENT: Normocephalic, atraumatic.  CV: Extremities warm, well perfused.   Resp: No respiratory distress. CTAB.  Abd: Soft, nontender.  Ext: No edema.  Neuro: Alert, oriented, appropriately interactive.  Right hemiparesis.    Laboratory data:   Na/K/Cl/CO2:  140/4.6/105/24 (06/18 0600)   BUN/Cr/glu/ALT/AST/amyl/lip:  11/0.7/--/--/--/--/-- (06/18 0600)    WBC/Hgb/Hct/Plts:  8.3/11.8/34.8/399 (06/18 0600)     Therapy progress:       PT    Supine to Sit: Partial/moderate assistance  Sit to Supine: Partial/moderate assistance   Sit to Stand: Supervision or touching assistance  Chair/Bed to Chair Transfer: Partial/moderate assistance  Car Transfer: Partial/moderate assistance  Ambulation 10 ft: Partial/moderate assistance  Ambulation 50 ft: Partial/moderate assistance  Ambulation 150 ft:    Stairs - 1 Step: Partial/moderate assistance  Stairs - 4 Step: Partial/moderate assistance  Stairs - 12 Step: Partial/moderate assistance    OT    Eating:    Oral Hygiene: Partial/moderate assistance  Bathing: Partial/moderate assistance  Upper Body Dressing: Partial/moderate assistance  Lower Body Dressing: Substantial/maximal assistance  Toilet Transfer: Partial/moderate assistance  Toilet Hygiene: Partial/moderate assistance    Speech Therapy    Pt presents with mild dysarthria characterized by intermittent increased rate and blended word boundaries (primarily noted with /s/). Pt with good intelligibility despite mild  dysarthria observed. Pt with mild cognitive impairment characterized by deficits in short term memory, recall of new information, and deficits in executive functioning. Although pt does have the above deficits pt noted to have good insight to deficits and was able to state when he was having difficulty with a task. Pt reports she drives and primarily manages her medications and finances. She would benefit from skilled speech services in the areas of cognition and speech to return to [facility].    Body mass index is 26.24 kg/m².        Assessment/Plan:  Functional progress: Ambulating 15' x2 CGA w/o device.  This patient continues to require an ARU level of care from all disciplines to address the following issues:    #. Left pontinue ischemic stroke  #. Right hemiparesis, dysarthria due to above  - CT Head demonstrated no acute abnormality.   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   - MRI Brain demonstrated acute left pontine infarct.   - TTE showed LVEF 60-65%, no evidence of PFO.   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   Plan:  - 30 day cardiac event monitor  - ASA, high-dose statin  - BP control. Avoid hypotension.   - Monitor for emerging spasticity. Encourage passive joint ROM.   - Continue PT/OT/SLP     #. HTN  - Continue CCB, ARB w/ hold parameters.  - Orthostatic vitals negative on 6/16.  - Renal function and electrolytes stable on 6/18     #.  Mild normocytic anemia  - Improving    #. Anxiety  - Atarax PRN, so far not requiring    CODE: Full Code  Diet: ADULT DIET; Regular  Bowels: Senna 2 tabs daily, Miralax PRN, suppository PRN  Bladder: Bladder scans per ARU protocol  Sleep: Melatonin PRN  Pain: Tylenol PRN   DVT PPx: Lovenox  Dispo: Home on 6/26  Services: TBD  DME: Right custom AFO  Follow-ups: Neurology, PCP    Tai Doty MD 6/18/2025, 8:28

## 2025-06-18 NOTE — PROGRESS NOTES
Shift assessment done. All night time medications given per MAR. Patient took all her oral medications whole with water, tolerated well. All fall precautions implemented. All needs attended. Electronically signed by Rosie Sol RN on 6/17/2025 at 9:07 PM

## 2025-06-18 NOTE — PROGRESS NOTES
[facility] - Inpatient Rehabilitation Department   Phone: [phone]    Physical Therapy    [] Initial Evaluation            [x] Daily Treatment Note         [] Discharge Summary      Patient: Vanessa Kimball   : 1955   MRN: [id number]   Date of Service:  2025  Admitting Diagnosis: Acute cerebrovascular accident (HCC)  Current Admission Summary: Vanessa Kimball is a 69 y.o. female with PMHx notable for HTN, HLD, pituitary tumor s/p resection who presented to [facility] on  with right sided weakness. MRI Brain demonstrated acute left pontine infarct.   Past Medical History:  has a past medical history of Acute cerebrovascular accident (HCC), Allergic rhinitis, Chicken pox, GERD (gastroesophageal reflux disease), Hypertension, Kidney stone, Pituitary adenoma (HCC), Pituitary microadenoma (HCC), and Uterine fibroid.  Past Surgical History:  has a past surgical history that includes Hysterectomy; Total abdominal hysterectomy w/ bilateral salpingoophorectomy; pituitary surgery (); cyst removal (Left, 2013); Nasal sinus surgery (N/A, 2020); craniotomy (N/A, 2020); and brain surgery.  Discharge Recommendations: HH PT vs OP PT, family assist PRN  DME Required For Discharge: DME to be determined pending patient progress,    Precautions/Restrictions: high fall risk  Weight Bearing Restrictions: no restrictions    Required Braces/Orthotics: no braces required  Positional Restrictions:no positional restrictions    Pre-Admission Information   Lives With: daughter               Type of Home: house  Home Layout: two level,  8 + 7 steps between levels with one HR. Bed/bath upstairs and 1/2 bath on main level   Home Access: 6 step to enter without rails   Bathroom Layout: tub/shower unit, curtain   Bathroom Equipment: tub transfer bench  Toilet Height: standard height  Home Equipment: no prior equipment  Transfer Assistance: Independent without use of device  Ambulation

## 2025-06-18 NOTE — PLAN OF CARE
Problem: Safety - Adult  Goal: Free from fall injury  6/17/2025 2106 by Rosie Sol RN  Outcome: Progressing  6/17/2025 1038 by Rachelle Hurtado RN  Outcome: Progressing     Problem: Nutrition Deficit:  Goal: Optimize nutritional status  Outcome: Progressing     Problem: Chronic Conditions and Co-morbidities  Goal: Patient's chronic conditions and co-morbidity symptoms are monitored and maintained or improved  Outcome: Progressing     Problem: Discharge Planning  Goal: Discharge to home or other facility with appropriate resources  6/17/2025 2106 by Rosie Slo RN  Outcome: Progressing  6/17/2025 1038 by Rachelle Hurtado RN  Outcome: Progressing

## 2025-06-18 NOTE — CARE COORDINATION
Team conference held today.?Spoke with team to discuss progress with therapy, barriers to discharge, and plans to return home. Estimated discharge date set for 6/26/2025.  Pt admitted on 6/13 for Acue CVA.  Speech rec regular diet and thin liquids.  PT rec HHC for PT. Limited RUE hand and wrist movement as well as weakened R UE; impaired (R) LE strength including drop foot, impaired balance and coordination, impaired gait mechanics, mild cognitive deficits, mild dysarthria  DME needs will be determined closer to d/c, Will continue to follow for support and discharge planning.   Marya Matamoros, MSN, RN, Case Management  Office: [phone]  Electronically signed by Marya Matamoros on 6/18/2025 at 12:16 PM

## 2025-06-19 PROCEDURE — 97116 GAIT TRAINING THERAPY: CPT

## 2025-06-19 PROCEDURE — 6370000000 HC RX 637 (ALT 250 FOR IP): Performed by: STUDENT IN AN ORGANIZED HEALTH CARE EDUCATION/TRAINING PROGRAM

## 2025-06-19 PROCEDURE — 97110 THERAPEUTIC EXERCISES: CPT

## 2025-06-19 PROCEDURE — 97530 THERAPEUTIC ACTIVITIES: CPT

## 2025-06-19 PROCEDURE — 92507 TX SP LANG VOICE COMM INDIV: CPT

## 2025-06-19 PROCEDURE — 97112 NEUROMUSCULAR REEDUCATION: CPT

## 2025-06-19 PROCEDURE — 97535 SELF CARE MNGMENT TRAINING: CPT

## 2025-06-19 PROCEDURE — 97129 THER IVNTJ 1ST 15 MIN: CPT

## 2025-06-19 PROCEDURE — 6360000002 HC RX W HCPCS: Performed by: STUDENT IN AN ORGANIZED HEALTH CARE EDUCATION/TRAINING PROGRAM

## 2025-06-19 PROCEDURE — 1280000000 HC REHAB R&B

## 2025-06-19 RX ADMIN — ENOXAPARIN SODIUM 40 MG: 100 INJECTION SUBCUTANEOUS at 09:28

## 2025-06-19 RX ADMIN — LOSARTAN POTASSIUM 100 MG: 25 TABLET, FILM COATED ORAL at 09:28

## 2025-06-19 RX ADMIN — ATORVASTATIN CALCIUM 80 MG: 80 TABLET, FILM COATED ORAL at 20:17

## 2025-06-19 RX ADMIN — Medication 1000 UNITS: at 09:28

## 2025-06-19 RX ADMIN — STANDARDIZED SENNA CONCENTRATE AND DOCUSATE SODIUM 2 TABLET: 8.6; 5 TABLET ORAL at 09:28

## 2025-06-19 RX ADMIN — AMLODIPINE BESYLATE 5 MG: 5 TABLET ORAL at 09:28

## 2025-06-19 RX ADMIN — ASPIRIN 81 MG: 81 TABLET, CHEWABLE ORAL at 09:28

## 2025-06-19 RX ADMIN — MELATONIN TAB 3 MG 3 MG: 3 TAB at 20:17

## 2025-06-19 NOTE — PROGRESS NOTES
[facility] - Inpatient Rehabilitation Department   Phone: [phone]    Speech Therapy   Daily Treatment Note     Patient: Vanessa Kimball   : 1955   MRN: [id number]   Date of Service:  2025  Admitting Diagnosis: Acute cerebrovascular accident (HCC)  Current Admission Summary: Vanessa Kimball is a 69 y.o. female with PMHx notable for HTN, HLD, pituitary tumor s/p resection who presented to [facility] on  with right sided weakness. MRI Brain demonstrated acute left pontine infarct.   Past Medical History:  has a past medical history of Acute cerebrovascular accident (HCC), Allergic rhinitis, Chicken pox, GERD (gastroesophageal reflux disease), Hypertension, Kidney stone, Pituitary adenoma (HCC), Pituitary microadenoma (HCC), and Uterine fibroid.  Past Surgical History:  has a past surgical history that includes Hysterectomy; Total abdominal hysterectomy w/ bilateral salpingoophorectomy; pituitary surgery (); cyst removal (Left, 2013); Nasal sinus surgery (N/A, 2020); craniotomy (N/A, 2020); and brain surgery.  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  Absent or markedly hypoplastic caudal basilar   artery and small bilateral distal vertebral arteries appearing to end in the anterior inferior cerebellar arteries.  No intracranial arterial occlusion or high-grade stenosis identified.    MRI Brain    Acute bilobed nonhemorrhagic left pontine infarct.   Precautions/Restrictions: high fall risk      Pre-Admission Information   Living Status: lives with daughter  Occupation/School: graduated high school,  for [employer]  Medication Management: :  [x]Primary   []Secondary []No  Finance

## 2025-06-19 NOTE — PLAN OF CARE
Problem: Discharge Planning  Goal: Discharge to home or other facility with appropriate resources  Outcome: Progressing     Problem: Chronic Conditions and Co-morbidities  Goal: Patient's chronic conditions and co-morbidity symptoms are monitored and maintained or improved  Outcome: Progressing     Problem: Safety - Adult  Goal: Free from fall injury  Outcome: Progressing       Problem: ABCDS Injury Assessment  Goal: Absence of physical injury  Outcome: Progressing

## 2025-06-19 NOTE — PROGRESS NOTES
Nutrition Note    RECOMMENDATIONS  PO Diet: Regular  Nutrition Support: N/A     ASSESSMENT   Nutrition status is stable AEB po intake greater than 75% most meals.  Skin is intact.  Remains on a regular diet with no nutrition concerns expressed @ this time.       Malnutrition Status: No malnutrition     NUTRITION DIAGNOSIS   Increased nutrient needs related to increase demand for energy/nutrients as evidenced by rehab for strength and conditioning    Goals: PO intake 50% or greater, by next RD assessment     NUTRITION RELATED FINDINGS  Objective: No edema noted; LBM 6/18; gluc 105  Wounds: None    CURRENT NUTRITION THERAPIES  ADULT DIET; Regular       PO Intake: %   PO Supplement Intake:None Ordered    ANTHROPOMETRICS  Current Height: 170.2 cm (5' 7\")  Current Weight - Scale: 76 kg (167 lb 8.8 oz)    Admission weight: 76 kg (167 lb 8.8 oz)    COMPARATIVE STANDARDS  Total Energy Requirements (kcals/day): 7107-8431     Protein (g):  76-91       Fluid (mL/day):  1859    EDUCATION  Education/Counseling not indicated     The patient will be monitored per nutrition standards of care. Consult dietitian if additional nutrition interventions are needed prior to RD reassessment.     Anuja Summers, RACHEL, LD    Contact: [phone]

## 2025-06-19 NOTE — PLAN OF CARE
Problem: Discharge Planning  Goal: Discharge to home or other facility with appropriate resources  6/18/2025 2203 by Carla Cho RN  Outcome: Progressing  Flowsheets (Taken 6/18/2025 2025)  Discharge to home or other facility with appropriate resources:   Identify barriers to discharge with patient and caregiver   Refer to discharge planning if patient needs post-hospital services based on physician order or complex needs related to functional status, cognitive ability or social support system  6/18/2025 1402 by Shazia Whitney RN  Outcome: Progressing     Problem: Chronic Conditions and Co-morbidities  Goal: Patient's chronic conditions and co-morbidity symptoms are monitored and maintained or improved  6/18/2025 2203 by Carla Cho RN  Outcome: Progressing  Flowsheets (Taken 6/18/2025 2025)  Care Plan - Patient's Chronic Conditions and Co-Morbidity Symptoms are Monitored and Maintained or Improved:   Monitor and assess patient's chronic conditions and comorbid symptoms for stability, deterioration, or improvement   Collaborate with multidisciplinary team to address chronic and comorbid conditions and prevent exacerbation or deterioration   Update acute care plan with appropriate goals if chronic or comorbid symptoms are exacerbated and prevent overall improvement and discharge  6/18/2025 1402 by Shazia Whitney RN  Outcome: Progressing     Problem: Safety - Adult  Goal: Free from fall injury  6/18/2025 2203 by Carla Cho RN  Outcome: Progressing  6/18/2025 1402 by Shazia Whitney RN  Outcome: Progressing     Problem: Nutrition Deficit:  Goal: Optimize nutritional status  6/18/2025 2203 by Carla Cho RN  Outcome: Progressing  6/18/2025 1402 by Shazia Whitney RN  Outcome: Progressing     Problem: ABCDS Injury Assessment  Goal: Absence of physical injury  Outcome: Progressing

## 2025-06-19 NOTE — PROGRESS NOTES
[facility] - Inpatient Rehabilitation Department   Phone: [phone]    Physical Therapy    [] Initial Evaluation            [x] Daily Treatment Note         [] Discharge Summary      Patient: Vanessa Kimball   : 1955   MRN: [id number]   Date of Service:  2025  Admitting Diagnosis: Acute cerebrovascular accident (HCC)  Current Admission Summary: Vanessa Kimball is a 69 y.o. female with PMHx notable for HTN, HLD, pituitary tumor s/p resection who presented to [facility] on  with right sided weakness. MRI Brain demonstrated acute left pontine infarct.   Past Medical History:  has a past medical history of Acute cerebrovascular accident (HCC), Allergic rhinitis, Chicken pox, GERD (gastroesophageal reflux disease), Hypertension, Kidney stone, Pituitary adenoma (HCC), Pituitary microadenoma (HCC), and Uterine fibroid.  Past Surgical History:  has a past surgical history that includes Hysterectomy; Total abdominal hysterectomy w/ bilateral salpingoophorectomy; pituitary surgery (); cyst removal (Left, 2013); Nasal sinus surgery (N/A, 2020); craniotomy (N/A, 2020); and brain surgery.  Discharge Recommendations: HH PT vs OP PT, family assist PRN  DME Required For Discharge: DME to be determined pending patient progress  Precautions/Restrictions: high fall risk  Weight Bearing Restrictions: no restrictions    Required Braces/Orthotics: no braces required  Positional Restrictions:no positional restrictions    Pre-Admission Information   Lives With: daughter               Type of Home: house  Home Layout: two level,  8 + 7 steps between levels with one HR. Bed/bath upstairs and 1/2 bath on main level   Home Access: 6 step to enter without rails   Bathroom Layout: tub/shower unit, curtain   Bathroom Equipment: tub transfer bench  Toilet Height: standard height  Home Equipment: no prior equipment  Transfer Assistance: Independent without use of device  Ambulation

## 2025-06-19 NOTE — PROGRESS NOTES
[facility] - Inpatient Rehabilitation Department   Phone: [phone]    Occupational Therapy    [] Initial Evaluation            [x] Daily Treatment Note         [] Discharge Summary      Patient: Vanessa Kimball   : 1955   MRN: [id number]   Date of Service:  2025    Admitting Diagnosis:  Acute cerebrovascular accident (HCC)  Current Admission Summary: Vanessa Kimball is a 69 y.o. female with PMHx notable for HTN, HLD, pituitary tumor s/p resection who presented to [facility] on  with right sided weakness. Initial NIHSS 2. CT Head demonstrated no acute abnormality. 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 MRI Brain demonstrated acute left pontine infarct. TTE showed LVEF 60-65%, no evidence of PFO. 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  Past Surgical History:  has a past surgical history that includes Hysterectomy; Total abdominal hysterectomy w/ bilateral salpingoophorectomy; pituitary surgery (); cyst removal (Left, 2013); Nasal sinus surgery (N/A, 2020); craniotomy (N/A, 2020); and brain surgery.    Discharge Recommendations: HH OT    DME Required For Discharge: DME to be determined pending patient progress    Precautions/Restrictions: high fall risk  Weight Bearing Restrictions: no restrictions       Required Braces/Orthotics: no braces required    Positional Restrictions:no positional restrictions    Pre-Admission Information   Lives With: daughter    Type of Home: house  Home Layout: two level,  8 + 7 steps between levels with one HR.

## 2025-06-20 LAB
ANION GAP SERPL CALCULATED.3IONS-SCNC: 9 MMOL/L (ref 3–16)
BASOPHILS # BLD: 0.1 K/UL (ref 0–0.2)
BASOPHILS NFR BLD: 1.3 %
BUN SERPL-MCNC: 13 MG/DL (ref 7–20)
CALCIUM SERPL-MCNC: 9.5 MG/DL (ref 8.3–10.6)
CHLORIDE SERPL-SCNC: 105 MMOL/L (ref 99–110)
CO2 SERPL-SCNC: 26 MMOL/L (ref 21–32)
CREAT SERPL-MCNC: 0.8 MG/DL (ref 0.6–1.2)
DEPRECATED RDW RBC AUTO: 14.7 % (ref 12.4–15.4)
EOSINOPHIL # BLD: 0.3 K/UL (ref 0–0.6)
EOSINOPHIL NFR BLD: 3.3 %
GFR SERPLBLD CREATININE-BSD FMLA CKD-EPI: 80 ML/MIN/{1.73_M2}
GLUCOSE SERPL-MCNC: 99 MG/DL (ref 70–99)
HCT VFR BLD AUTO: 35 % (ref 36–48)
HGB BLD-MCNC: 11.7 G/DL (ref 12–16)
LYMPHOCYTES # BLD: 2.8 K/UL (ref 1–5.1)
LYMPHOCYTES NFR BLD: 33.3 %
MCH RBC QN AUTO: 30.8 PG (ref 26–34)
MCHC RBC AUTO-ENTMCNC: 33.4 G/DL (ref 31–36)
MCV RBC AUTO: 92.3 FL (ref 80–100)
MONOCYTES # BLD: 0.7 K/UL (ref 0–1.3)
MONOCYTES NFR BLD: 8.6 %
NEUTROPHILS # BLD: 4.5 K/UL (ref 1.7–7.7)
NEUTROPHILS NFR BLD: 53.5 %
PLATELET # BLD AUTO: 407 K/UL (ref 135–450)
PMV BLD AUTO: 7.9 FL (ref 5–10.5)
POTASSIUM SERPL-SCNC: 4.6 MMOL/L (ref 3.5–5.1)
RBC # BLD AUTO: 3.79 M/UL (ref 4–5.2)
SODIUM SERPL-SCNC: 140 MMOL/L (ref 136–145)
WBC # BLD AUTO: 8.4 K/UL (ref 4–11)

## 2025-06-20 PROCEDURE — 97130 THER IVNTJ EA ADDL 15 MIN: CPT

## 2025-06-20 PROCEDURE — 1280000000 HC REHAB R&B

## 2025-06-20 PROCEDURE — 97112 NEUROMUSCULAR REEDUCATION: CPT

## 2025-06-20 PROCEDURE — 97110 THERAPEUTIC EXERCISES: CPT

## 2025-06-20 PROCEDURE — 6370000000 HC RX 637 (ALT 250 FOR IP): Performed by: STUDENT IN AN ORGANIZED HEALTH CARE EDUCATION/TRAINING PROGRAM

## 2025-06-20 PROCEDURE — 85025 COMPLETE CBC W/AUTO DIFF WBC: CPT

## 2025-06-20 PROCEDURE — 97116 GAIT TRAINING THERAPY: CPT

## 2025-06-20 PROCEDURE — 97530 THERAPEUTIC ACTIVITIES: CPT

## 2025-06-20 PROCEDURE — 80048 BASIC METABOLIC PNL TOTAL CA: CPT

## 2025-06-20 PROCEDURE — 6360000002 HC RX W HCPCS: Performed by: STUDENT IN AN ORGANIZED HEALTH CARE EDUCATION/TRAINING PROGRAM

## 2025-06-20 PROCEDURE — 97129 THER IVNTJ 1ST 15 MIN: CPT

## 2025-06-20 PROCEDURE — 36415 COLL VENOUS BLD VENIPUNCTURE: CPT

## 2025-06-20 RX ADMIN — ATORVASTATIN CALCIUM 80 MG: 80 TABLET, FILM COATED ORAL at 20:11

## 2025-06-20 RX ADMIN — LOSARTAN POTASSIUM 100 MG: 25 TABLET, FILM COATED ORAL at 08:43

## 2025-06-20 RX ADMIN — AMLODIPINE BESYLATE 5 MG: 5 TABLET ORAL at 08:43

## 2025-06-20 RX ADMIN — STANDARDIZED SENNA CONCENTRATE AND DOCUSATE SODIUM 2 TABLET: 8.6; 5 TABLET ORAL at 08:43

## 2025-06-20 RX ADMIN — MELATONIN TAB 3 MG 3 MG: 3 TAB at 20:11

## 2025-06-20 RX ADMIN — Medication 1000 UNITS: at 08:43

## 2025-06-20 RX ADMIN — ASPIRIN 81 MG: 81 TABLET, CHEWABLE ORAL at 08:43

## 2025-06-20 RX ADMIN — ENOXAPARIN SODIUM 40 MG: 100 INJECTION SUBCUTANEOUS at 08:43

## 2025-06-20 RX ADMIN — POLYETHYLENE GLYCOL 3350 17 G: 17 POWDER, FOR SOLUTION ORAL at 08:43

## 2025-06-20 NOTE — PLAN OF CARE
Problem: Discharge Planning  Goal: Discharge to home or other facility with appropriate resources  6/19/2025 2203 by Carla Cho, RN  Outcome: Progressing  Flowsheets (Taken 6/19/2025 2015)  Discharge to home or other facility with appropriate resources:   Identify barriers to discharge with patient and caregiver   Refer to discharge planning if patient needs post-hospital services based on physician order or complex needs related to functional status, cognitive ability or social support system     Problem: Chronic Conditions and Co-morbidities  Goal: Patient's chronic conditions and co-morbidity symptoms are monitored and maintained or improved  6/19/2025 2203 by Carla Cho, RN  Outcome: Progressing  Flowsheets (Taken 6/19/2025 2015)  Care Plan - Patient's Chronic Conditions and Co-Morbidity Symptoms are Monitored and Maintained or Improved:   Monitor and assess patient's chronic conditions and comorbid symptoms for stability, deterioration, or improvement   Collaborate with multidisciplinary team to address chronic and comorbid conditions and prevent exacerbation or deterioration   Update acute care plan with appropriate goals if chronic or comorbid symptoms are exacerbated and prevent overall improvement and discharge     Problem: Safety - Adult  Goal: Free from fall injury  6/19/2025 2203 by Carla Cho, RN  Outcome: Progressing     Problem: Nutrition Deficit:  Goal: Optimize nutritional status  6/19/2025 2203 by Carla Cho RN  Outcome: Progressing     Problem: ABCDS Injury Assessment  Goal: Absence of physical injury  6/19/2025 2203 by Carla Cho, RN  Outcome: Progressing

## 2025-06-20 NOTE — PROGRESS NOTES
[facility] - Inpatient Rehabilitation Department   Phone: [phone]    Speech Therapy   Daily Treatment Note     Patient: Vanessa Kimball   : 1955   MRN: [id number]   Date of Service:  2025  Admitting Diagnosis: Acute cerebrovascular accident (HCC)  Current Admission Summary: Vanessa Kimball is a 69 y.o. female with PMHx notable for HTN, HLD, pituitary tumor s/p resection who presented to [facility] on  with right sided weakness. MRI Brain demonstrated acute left pontine infarct.   Past Medical History:  has a past medical history of Acute cerebrovascular accident (HCC), Allergic rhinitis, Chicken pox, GERD (gastroesophageal reflux disease), Hypertension, Kidney stone, Pituitary adenoma (HCC), Pituitary microadenoma (HCC), and Uterine fibroid.  Past Surgical History:  has a past surgical history that includes Hysterectomy; Total abdominal hysterectomy w/ bilateral salpingoophorectomy; pituitary surgery (); cyst removal (Left, 2013); Nasal sinus surgery (N/A, 2020); craniotomy (N/A, 2020); and brain surgery.  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  Absent or markedly hypoplastic caudal basilar   artery and small bilateral distal vertebral arteries appearing to end in the anterior inferior cerebellar arteries.  No intracranial arterial occlusion or high-grade stenosis identified.    MRI Brain    Acute bilobed nonhemorrhagic left pontine infarct.   Precautions/Restrictions: high fall risk      Pre-Admission Information   Living Status: lives with daughter  Occupation/School: graduated high school, retired  for [employer]  Medication Management: :  [x]Primary   []Secondary []No  Finance

## 2025-06-20 NOTE — PROGRESS NOTES
Vanessa JOE Jigar  6/20/2025  [id number]    Chief Complaint: Acute cerebrovascular accident (HCC)    Subjective    No acute events overnight.     Patient reports that she is doing well. Having some right lateral foot pain, thinks her shoes are fitting a little too tightly. Discussed trying to get looser fitting shoes, especially to accommodate custom AFO once fitted. Last Bowel Movement:  06/17/25.           Objective    Patient Vitals for the past 24 hrs:   BP Temp Temp src Pulse Resp SpO2 Height   06/20/25 0845 116/73 98.2 °F (36.8 °C) Oral 76 16 98 % --   06/19/25 2015 122/78 98.2 °F (36.8 °C) Oral 76 16 98 % --   06/19/25 1224 -- -- -- -- -- -- 1.702 m (5' 7\")   06/19/25 0930 125/79 98.2 °F (36.8 °C) Oral 77 16 97 % --     No data found.     Gen: No distress.  HEENT: Normocephalic, atraumatic.  CV: Extremities warm, well perfused.   Resp: No respiratory distress. CTAB.  Abd: Soft, nontender.  Ext: No edema.  Neuro: Alert, oriented, appropriately interactive.  Right hemiparesis.    Laboratory data:   Na/K/Cl/CO2:  140/4.6/105/26 (06/20 0604)   BUN/Cr/glu/ALT/AST/amyl/lip:  13/0.8/--/--/--/--/-- (06/20 0604)    WBC/Hgb/Hct/Plts:  8.4/11.7/35.0/407 (06/20 0604)            Therapy progress:       PT    Supine to Sit: Partial/moderate assistance  Sit to Supine: Partial/moderate assistance   Sit to Stand: Supervision or touching assistance  Chair/Bed to Chair Transfer: Partial/moderate assistance  Car Transfer: Partial/moderate assistance  Ambulation 10 ft: Partial/moderate assistance  Ambulation 50 ft: Partial/moderate assistance  Ambulation 150 ft:    Stairs - 1 Step: Partial/moderate assistance  Stairs - 4 Step: Partial/moderate assistance  Stairs - 12 Step: Partial/moderate assistance    OT    Eating:    Oral Hygiene: Partial/moderate assistance  Bathing: Partial/moderate assistance  Upper Body Dressing: Partial/moderate assistance  Lower Body Dressing: Substantial/maximal assistance  Toilet Transfer: Partial/moderate

## 2025-06-20 NOTE — PLAN OF CARE
Problem: Discharge Planning  Goal: Discharge to home or other facility with appropriate resources  6/20/2025 0917 by Shazia Whitney, RN  Outcome: Progressing     Problem: Chronic Conditions and Co-morbidities  Goal: Patient's chronic conditions and co-morbidity symptoms are monitored and maintained or improved  6/20/2025 0917 by Shazia Whitney, RN  Outcome: Progressing     Problem: Safety - Adult  Goal: Free from fall injury  6/20/2025 0917 by Shazia Whitney, RN  Outcome: Progressing     Problem: Nutrition Deficit:  Goal: Optimize nutritional status  6/20/2025 0917 by Shazia Whitney, RN  Outcome: Progressing

## 2025-06-20 NOTE — PROGRESS NOTES
[facility] - Inpatient Rehabilitation Department   Phone: [phone]    Physical Therapy    [] Initial Evaluation            [x] Daily Treatment Note         [] Discharge Summary      Patient: Vanessa Kimball   : 1955   MRN: [id number]   Date of Service:  2025  Admitting Diagnosis: Acute cerebrovascular accident (HCC)  Current Admission Summary: aVnessa Kimball is a 69 y.o. female with PMHx notable for HTN, HLD, pituitary tumor s/p resection who presented to [facility] on  with right sided weakness. MRI Brain demonstrated acute left pontine infarct.   Past Medical History:  has a past medical history of Acute cerebrovascular accident (HCC), Allergic rhinitis, Chicken pox, GERD (gastroesophageal reflux disease), Hypertension, Kidney stone, Pituitary adenoma (HCC), Pituitary microadenoma (HCC), and Uterine fibroid.  Past Surgical History:  has a past surgical history that includes Hysterectomy; Total abdominal hysterectomy w/ bilateral salpingoophorectomy; pituitary surgery (); cyst removal (Left, 2013); Nasal sinus surgery (N/A, 2020); craniotomy (N/A, 2020); and brain surgery.  Discharge Recommendations: HH PT, family assist PRN  DME Required For Discharge: rolling walker, custom AFO  Precautions/Restrictions: high fall risk  Weight Bearing Restrictions: no restrictions    Required Braces/Orthotics: no braces required  Positional Restrictions:no positional restrictions    Pre-Admission Information   Lives With: daughter               Type of Home: house  Home Layout: two level,  8 + 7 steps between levels with one HR. Bed/bath upstairs and 1/2 bath on main level   Home Access: 6 step to enter without rails   Bathroom Layout: tub/shower unit, curtain   Bathroom Equipment: tub transfer bench  Toilet Height: standard height  Home Equipment: no prior equipment  Transfer Assistance: Independent without use of device  Ambulation Assistance:Independent without  SBA/supervision without use of UE support  Dynamic Sitting Balance: fair (+): maintains balance at SBA/supervision without use of UE support  Static Standing Balance: fair: maintains balance at CGA without use of UE support  Dynamic Standing Balance: fair (-): maintains balance at CGA with use of UE support  Comments:    Other Therapeutic Interventions   1st Session:  See above functional mobility. Patient completes (R) LE kailey forward / backward assisted step over x 20 with (B) UE support. Completed standing activities in parallel bars including: lateral stepping x 3 ea, squats x 20, and marching in place x 10 with (B) UE support at CGA. During all standing activities, patient requires assistance for knee/hip mechanics in addition to LE placement and stability. Completed four square stepping activity including: CW and CWW x 5 with no UE support at CGA/ min A. During four square stepping, patient had periods of loss of balance and hyperextension in R knee required min A.      2nd Session:   Pt seated in the chair on arrival. Patient reports measurement taken by outside vendor for custom AFO.  Agreeable to therapy session. Session begins with donning of DF assist. Sit to stand transfers completed at CGA throughout session. Pt ambulated 50 ft with rolling walker at CGA. Seated stepper at L2.5 x 7 min using LE only. Completed static standing on airex bouncing swiss ball back and forth to therapist then trunk rotation with ball bounce x 3 min at CGA. Pt ambulated 50 ft with rolling walker at CGA. Pt seated in recliner with chair alarm on and call light in reach.     Functional Outcomes                 Cognition  WFL  Orientation:    alert and oriented x 4  Command Following:   WFL    Education  Barriers To Learning: none  Patient Education: patient educated on goals, PT role and benefits, plan of care, general safety, functional mobility training, proper use of assistive device/equipment, disease specific education,

## 2025-06-20 NOTE — PROGRESS NOTES
[facility] - Inpatient Rehabilitation Department   Phone: [phone]    Occupational Therapy    [] Initial Evaluation            [x] Daily Treatment Note         [] Discharge Summary      Patient: Vanessa Kimball   : 1955   MRN: [id number]   Date of Service:  2025    Admitting Diagnosis:  Acute cerebrovascular accident (HCC)  Current Admission Summary: Vanessa Kimball is a 69 y.o. female with PMHx notable for HTN, HLD, pituitary tumor s/p resection who presented to [facility] on  with right sided weakness. Initial NIHSS 2. CT Head demonstrated no acute abnormality. 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 MRI Brain demonstrated acute left pontine infarct. TTE showed LVEF 60-65%, no evidence of PFO. 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  Past Surgical History:  has a past surgical history that includes Hysterectomy; Total abdominal hysterectomy w/ bilateral salpingoophorectomy; pituitary surgery (); cyst removal (Left, 2013); Nasal sinus surgery (N/A, 2020); craniotomy (N/A, 2020); and brain surgery.    Discharge Recommendations: HH OT    DME Required For Discharge: DME to be determined pending patient progress    Precautions/Restrictions: high fall risk  Weight Bearing Restrictions: no restrictions    Required Braces/Orthotics: no braces required  Positional Restrictions:no positional restrictions    Pre-Admission Information   Lives With: daughter    Type of Home: house  Home Layout: two level,  8 + 7 steps between levels with one HR.    Instrumental Activities of Daily Living  No IADL completed on this date.    Functional Mobility  Bed Mobility:  Bed mobility not completed on this date.  Comments:  Transfers:  Sit to stand transfer:contact guard assistance  Stand to sit transfer: contact guard assistance  Comments: Pt continues to demonstrate good walker management with no cues provided. Progressing to SBA.  Functional Mobility  Functional Mobility Activity: to/from therapy room  Device Use: rolling walker  Required Assistance: contact guard assistance  Comment: No LOB, gait deviation with increased external rotation during swing phase with increased step height on R LE. Decreased coordination during ambulation. Progressing to SBA.  Balance:  Static Sitting Balance: good: independent with functional balance in unsupported position  Dynamic Sitting Balance: fair (+): maintains balance at SBA/supervision without use of UE support  Static Standing Balance: fair (+): maintains balance at SBA/supervision without use of UE support  Dynamic Standing Balance: fair: maintains balance at CGA without use of UE support  Comments: No LOB noted. Progressing to SBA during ambulation, CGA still required during functional reaching tasks d/t unsteadiness.    Other Therapeutic Interventions  Pt completes rolling ball on wall to marked location destinations at different reach levels. Lateral stepping required throughout to be at reaching distance of different locations. Done to address fine motor skills and in-hand manipulation in R UE. Pt requires CGA throughout. Notes minimal pain in R shoulder during flexion. Requires Min A for flexing shoulder to reach higher markers. Pt located X5 markers for 2 trials     X10 squigz placed on wall. Pt with external bar support instructed to remove them from wall with use of tripod grasp. Pt unable to pull off with tripod grasp, completes activity by placing squigz in between digits 1-3 primarily to pull off wall with occasional

## 2025-06-21 PROCEDURE — 1280000000 HC REHAB R&B

## 2025-06-21 PROCEDURE — 6370000000 HC RX 637 (ALT 250 FOR IP): Performed by: STUDENT IN AN ORGANIZED HEALTH CARE EDUCATION/TRAINING PROGRAM

## 2025-06-21 PROCEDURE — 6360000002 HC RX W HCPCS: Performed by: STUDENT IN AN ORGANIZED HEALTH CARE EDUCATION/TRAINING PROGRAM

## 2025-06-21 RX ADMIN — ENOXAPARIN SODIUM 40 MG: 100 INJECTION SUBCUTANEOUS at 08:41

## 2025-06-21 RX ADMIN — AMLODIPINE BESYLATE 5 MG: 5 TABLET ORAL at 08:41

## 2025-06-21 RX ADMIN — LOSARTAN POTASSIUM 100 MG: 25 TABLET, FILM COATED ORAL at 08:41

## 2025-06-21 RX ADMIN — POLYETHYLENE GLYCOL 3350 17 G: 17 POWDER, FOR SOLUTION ORAL at 08:42

## 2025-06-21 RX ADMIN — STANDARDIZED SENNA CONCENTRATE AND DOCUSATE SODIUM 2 TABLET: 8.6; 5 TABLET ORAL at 08:41

## 2025-06-21 RX ADMIN — ASPIRIN 81 MG: 81 TABLET, CHEWABLE ORAL at 08:41

## 2025-06-21 RX ADMIN — ATORVASTATIN CALCIUM 80 MG: 80 TABLET, FILM COATED ORAL at 21:23

## 2025-06-21 RX ADMIN — Medication 1000 UNITS: at 08:41

## 2025-06-21 NOTE — PLAN OF CARE
Problem: Discharge Planning  Goal: Discharge to home or other facility with appropriate resources  6/20/2025 2229 by Carla Cho RN  Outcome: Progressing  Flowsheets (Taken 6/20/2025 2012)  Discharge to home or other facility with appropriate resources:   Identify barriers to discharge with patient and caregiver   Refer to discharge planning if patient needs post-hospital services based on physician order or complex needs related to functional status, cognitive ability or social support system  6/20/2025 0917 by Shazia Whitney RN  Outcome: Progressing     Problem: Chronic Conditions and Co-morbidities  Goal: Patient's chronic conditions and co-morbidity symptoms are monitored and maintained or improved  6/20/2025 2229 by Carla Cho RN  Outcome: Progressing  Flowsheets (Taken 6/20/2025 2012)  Care Plan - Patient's Chronic Conditions and Co-Morbidity Symptoms are Monitored and Maintained or Improved:   Monitor and assess patient's chronic conditions and comorbid symptoms for stability, deterioration, or improvement   Collaborate with multidisciplinary team to address chronic and comorbid conditions and prevent exacerbation or deterioration   Update acute care plan with appropriate goals if chronic or comorbid symptoms are exacerbated and prevent overall improvement and discharge  6/20/2025 0917 by Shazia Whitney RN  Outcome: Progressing     Problem: Safety - Adult  Goal: Free from fall injury  6/20/2025 2229 by Carla Cho RN  Outcome: Progressing  6/20/2025 0917 by Shazia Whitney RN  Outcome: Progressing     Problem: Nutrition Deficit:  Goal: Optimize nutritional status  6/20/2025 2229 by Carla Cho RN  Outcome: Progressing  6/20/2025 0917 by Shazia Whitney RN  Outcome: Progressing     Problem: ABCDS Injury Assessment  Goal: Absence of physical injury  6/20/2025 2229 by Carla Cho RN  Outcome: Progressing  Flowsheets (Taken 6/20/2025 2111)  Absence of

## 2025-06-21 NOTE — PLAN OF CARE
Problem: Safety - Adult  Goal: Free from fall injury  6/21/2025 1010 by Renetta De La Cruz RN  Outcome: Progressing  Note: Pt remains free from falls.  Safety precautions in place.  Bed in lowest position, bed/chair wheels locked, call light with in reach, bedside table in reach, bed/chair alarm on, fall risk wrist band on.

## 2025-06-22 VITALS
OXYGEN SATURATION: 97 % | WEIGHT: 167.55 LBS | DIASTOLIC BLOOD PRESSURE: 69 MMHG | BODY MASS INDEX: 26.3 KG/M2 | RESPIRATION RATE: 18 BRPM | HEIGHT: 67 IN | SYSTOLIC BLOOD PRESSURE: 106 MMHG | HEART RATE: 67 BPM | TEMPERATURE: 98 F

## 2025-06-22 LAB
GLUCOSE BLD-MCNC: 102 MG/DL (ref 70–99)
PERFORMED ON: ABNORMAL

## 2025-06-22 PROCEDURE — 6360000002 HC RX W HCPCS: Performed by: STUDENT IN AN ORGANIZED HEALTH CARE EDUCATION/TRAINING PROGRAM

## 2025-06-22 PROCEDURE — 1280000000 HC REHAB R&B

## 2025-06-22 PROCEDURE — 6370000000 HC RX 637 (ALT 250 FOR IP): Performed by: STUDENT IN AN ORGANIZED HEALTH CARE EDUCATION/TRAINING PROGRAM

## 2025-06-22 RX ORDER — SENNA AND DOCUSATE SODIUM 50; 8.6 MG/1; MG/1
2 TABLET, FILM COATED ORAL 2 TIMES DAILY
Status: DISCONTINUED | OUTPATIENT
Start: 2025-06-22 | End: 2025-06-26 | Stop reason: HOSPADM

## 2025-06-22 RX ADMIN — Medication 1000 UNITS: at 08:19

## 2025-06-22 RX ADMIN — ATORVASTATIN CALCIUM 80 MG: 80 TABLET, FILM COATED ORAL at 20:30

## 2025-06-22 RX ADMIN — STANDARDIZED SENNA CONCENTRATE AND DOCUSATE SODIUM 2 TABLET: 8.6; 5 TABLET ORAL at 20:30

## 2025-06-22 RX ADMIN — STANDARDIZED SENNA CONCENTRATE AND DOCUSATE SODIUM 2 TABLET: 8.6; 5 TABLET ORAL at 08:19

## 2025-06-22 RX ADMIN — AMLODIPINE BESYLATE 5 MG: 5 TABLET ORAL at 08:20

## 2025-06-22 RX ADMIN — ENOXAPARIN SODIUM 40 MG: 100 INJECTION SUBCUTANEOUS at 08:19

## 2025-06-22 RX ADMIN — ASPIRIN 81 MG: 81 TABLET, CHEWABLE ORAL at 08:20

## 2025-06-22 RX ADMIN — LOSARTAN POTASSIUM 100 MG: 25 TABLET, FILM COATED ORAL at 08:19

## 2025-06-22 RX ADMIN — POLYETHYLENE GLYCOL 3350 17 G: 17 POWDER, FOR SOLUTION ORAL at 16:45

## 2025-06-22 RX ADMIN — MELATONIN TAB 3 MG 3 MG: 3 TAB at 20:30

## 2025-06-22 NOTE — PLAN OF CARE
Problem: Discharge Planning  Goal: Discharge to home or other facility with appropriate resources  Outcome: Progressing     Problem: Chronic Conditions and Co-morbidities  Goal: Patient's chronic conditions and co-morbidity symptoms are monitored and maintained or improved  Outcome: Progressing  Flowsheets (Taken 6/21/2025 0834 by Renetta De La Cruz, RN)  Care Plan - Patient's Chronic Conditions and Co-Morbidity Symptoms are Monitored and Maintained or Improved: Monitor and assess patient's chronic conditions and comorbid symptoms for stability, deterioration, or improvement     Problem: Safety - Adult  Goal: Free from fall injury  6/21/2025 2308 by Zeynep Dillon, RN  Outcome: Progressing  6/21/2025 1010 by Renetta De La Cruz, RN  Outcome: Progressing  Note: Pt remains free from falls.  Safety precautions in place.  Bed in lowest position, bed/chair wheels locked, call light with in reach, bedside table in reach, bed/chair alarm on, fall risk wrist band on.       Problem: Nutrition Deficit:  Goal: Optimize nutritional status  Outcome: Progressing  Flowsheets (Taken 6/19/2025 1224 by Anuja Summers, RD, LD)  Nutrient intake appropriate for improving, restoring, or maintaining nutritional needs: Monitor oral intake, labs, and treatment plans     Problem: ABCDS Injury Assessment  Goal: Absence of physical injury  Outcome: Progressing  Flowsheets (Taken 6/20/2025 2111 by Carla Cho, RN)  Absence of Physical Injury: Implement safety measures based on patient assessment

## 2025-06-22 NOTE — PLAN OF CARE
Problem: Safety - Adult  Goal: Free from fall injury  6/22/2025 1011 by Renetta De La Cruz RN  Outcome: Progressing  Note: Pt remains free from falls.  Safety precautions in place.  Bed in lowest position, bed/chair wheels locked, call light with in reach, bedside table in reach, bed/chair alarm on, fall risk wrist band on.

## 2025-06-23 LAB
ANION GAP SERPL CALCULATED.3IONS-SCNC: 11 MMOL/L (ref 3–16)
BACTERIA URNS QL MICRO: ABNORMAL /HPF
BASOPHILS # BLD: 0.1 K/UL (ref 0–0.2)
BASOPHILS NFR BLD: 0.5 %
BILIRUB UR QL STRIP.AUTO: NEGATIVE
BUN SERPL-MCNC: 13 MG/DL (ref 7–20)
CALCIUM SERPL-MCNC: 9.9 MG/DL (ref 8.3–10.6)
CHLORIDE SERPL-SCNC: 103 MMOL/L (ref 99–110)
CLARITY UR: CLEAR
CO2 SERPL-SCNC: 24 MMOL/L (ref 21–32)
COLOR UR: YELLOW
CREAT SERPL-MCNC: 0.8 MG/DL (ref 0.6–1.2)
DEPRECATED RDW RBC AUTO: 14.6 % (ref 12.4–15.4)
EOSINOPHIL # BLD: 0.1 K/UL (ref 0–0.6)
EOSINOPHIL NFR BLD: 0.7 %
EPI CELLS #/AREA URNS AUTO: 2 /HPF (ref 0–5)
GFR SERPLBLD CREATININE-BSD FMLA CKD-EPI: 80 ML/MIN/{1.73_M2}
GLUCOSE SERPL-MCNC: 110 MG/DL (ref 70–99)
GLUCOSE UR STRIP.AUTO-MCNC: NEGATIVE MG/DL
HCT VFR BLD AUTO: 35.2 % (ref 36–48)
HGB BLD-MCNC: 11.8 G/DL (ref 12–16)
HGB UR QL STRIP.AUTO: NEGATIVE
HYALINE CASTS #/AREA URNS AUTO: 0 /LPF (ref 0–8)
KETONES UR STRIP.AUTO-MCNC: NEGATIVE MG/DL
LEUKOCYTE ESTERASE UR QL STRIP.AUTO: ABNORMAL
LYMPHOCYTES # BLD: 2 K/UL (ref 1–5.1)
LYMPHOCYTES NFR BLD: 13.8 %
MCH RBC QN AUTO: 30.4 PG (ref 26–34)
MCHC RBC AUTO-ENTMCNC: 33.5 G/DL (ref 31–36)
MCV RBC AUTO: 90.7 FL (ref 80–100)
MONOCYTES # BLD: 1 K/UL (ref 0–1.3)
MONOCYTES NFR BLD: 6.7 %
NEUTROPHILS # BLD: 11.3 K/UL (ref 1.7–7.7)
NEUTROPHILS NFR BLD: 78.3 %
NITRITE UR QL STRIP.AUTO: POSITIVE
PH UR STRIP.AUTO: 6.5 [PH] (ref 5–8)
PLATELET # BLD AUTO: 412 K/UL (ref 135–450)
PMV BLD AUTO: 7.9 FL (ref 5–10.5)
POTASSIUM SERPL-SCNC: 4.5 MMOL/L (ref 3.5–5.1)
PROT UR STRIP.AUTO-MCNC: NEGATIVE MG/DL
RBC # BLD AUTO: 3.88 M/UL (ref 4–5.2)
RBC CLUMPS #/AREA URNS AUTO: 0 /HPF (ref 0–4)
SODIUM SERPL-SCNC: 138 MMOL/L (ref 136–145)
SP GR UR STRIP.AUTO: <=1.005 (ref 1–1.03)
UA COMPLETE W REFLEX CULTURE PNL UR: ABNORMAL
UA DIPSTICK W REFLEX MICRO PNL UR: YES
URN SPEC COLLECT METH UR: ABNORMAL
UROBILINOGEN UR STRIP-ACNC: 0.2 E.U./DL
WBC # BLD AUTO: 14.4 K/UL (ref 4–11)
WBC #/AREA URNS AUTO: 1 /HPF (ref 0–5)

## 2025-06-23 PROCEDURE — 6360000002 HC RX W HCPCS: Performed by: STUDENT IN AN ORGANIZED HEALTH CARE EDUCATION/TRAINING PROGRAM

## 2025-06-23 PROCEDURE — 97530 THERAPEUTIC ACTIVITIES: CPT

## 2025-06-23 PROCEDURE — 81001 URINALYSIS AUTO W/SCOPE: CPT

## 2025-06-23 PROCEDURE — 97110 THERAPEUTIC EXERCISES: CPT

## 2025-06-23 PROCEDURE — 97535 SELF CARE MNGMENT TRAINING: CPT

## 2025-06-23 PROCEDURE — 97130 THER IVNTJ EA ADDL 15 MIN: CPT

## 2025-06-23 PROCEDURE — 6370000000 HC RX 637 (ALT 250 FOR IP): Performed by: STUDENT IN AN ORGANIZED HEALTH CARE EDUCATION/TRAINING PROGRAM

## 2025-06-23 PROCEDURE — 97129 THER IVNTJ 1ST 15 MIN: CPT

## 2025-06-23 PROCEDURE — 1280000000 HC REHAB R&B

## 2025-06-23 PROCEDURE — 97112 NEUROMUSCULAR REEDUCATION: CPT

## 2025-06-23 PROCEDURE — 97116 GAIT TRAINING THERAPY: CPT

## 2025-06-23 PROCEDURE — 36415 COLL VENOUS BLD VENIPUNCTURE: CPT

## 2025-06-23 PROCEDURE — 80048 BASIC METABOLIC PNL TOTAL CA: CPT

## 2025-06-23 PROCEDURE — 85025 COMPLETE CBC W/AUTO DIFF WBC: CPT

## 2025-06-23 RX ADMIN — ENOXAPARIN SODIUM 40 MG: 100 INJECTION SUBCUTANEOUS at 08:52

## 2025-06-23 RX ADMIN — STANDARDIZED SENNA CONCENTRATE AND DOCUSATE SODIUM 2 TABLET: 8.6; 5 TABLET ORAL at 08:52

## 2025-06-23 RX ADMIN — ASPIRIN 81 MG: 81 TABLET, CHEWABLE ORAL at 08:52

## 2025-06-23 RX ADMIN — MELATONIN TAB 3 MG 3 MG: 3 TAB at 21:49

## 2025-06-23 RX ADMIN — STANDARDIZED SENNA CONCENTRATE AND DOCUSATE SODIUM 2 TABLET: 8.6; 5 TABLET ORAL at 21:49

## 2025-06-23 RX ADMIN — ATORVASTATIN CALCIUM 80 MG: 80 TABLET, FILM COATED ORAL at 21:49

## 2025-06-23 RX ADMIN — Medication 1000 UNITS: at 08:53

## 2025-06-23 ASSESSMENT — PAIN SCALES - GENERAL
PAINLEVEL_OUTOF10: 0
PAINLEVEL_OUTOF10: 0

## 2025-06-23 NOTE — CARE COORDINATION
Late Entry:  Team conference held today.?Spoke with team and Dr. Doty  to discuss progress with therapy, barriers to discharge, and plans to return home. Estimated discharge date set for 6/26/2025.  Discharge plan currently:  Pt will need [facility] for Nsg, OT and PT.  Pt will also need DME rolling walker.  Referalls for both services sent through [facility].  Awaiting acceptance from facilities to meet discharge needs.   Will continue to follow for support and discharge planning.     DME referrals sent to:        [facility] service referrals sent to:       Marya Matamoros, DARIO, RN, Case Management  Office: [phone]  Electronically signed by Marya Matamoros on 6/22/2025 at 9:51 PM

## 2025-06-23 NOTE — PROGRESS NOTES
[facility] - Inpatient Rehabilitation Department   Phone: [phone]    Physical Therapy    [] Initial Evaluation            [x] Daily Treatment Note         [] Discharge Summary      Patient: Vanessa Kimball   : 1955   MRN: [id number]   Date of Service:  2025  Admitting Diagnosis: Acute cerebrovascular accident (HCC)  Current Admission Summary: Vanessa Kimball is a 69 y.o. female with PMHx notable for HTN, HLD, pituitary tumor s/p resection who presented to [facility] on  with right sided weakness. MRI Brain demonstrated acute left pontine infarct.   Past Medical History:  has a past medical history of Acute cerebrovascular accident (HCC), Allergic rhinitis, Chicken pox, GERD (gastroesophageal reflux disease), Hypertension, Kidney stone, Pituitary adenoma (HCC), Pituitary microadenoma (HCC), and Uterine fibroid.  Past Surgical History:  has a past surgical history that includes Hysterectomy; Total abdominal hysterectomy w/ bilateral salpingoophorectomy; pituitary surgery (); cyst removal (Left, 2013); Nasal sinus surgery (N/A, 2020); craniotomy (N/A, 2020); and brain surgery.  Discharge Recommendations: HH PT, family assist PRN  DME Required For Discharge: rolling walker, custom AFO  Precautions/Restrictions: high fall risk  Weight Bearing Restrictions: no restrictions    Required Braces/Orthotics: no braces required  Positional Restrictions:no positional restrictions    Pre-Admission Information   Lives With: daughter               Type of Home: house  Home Layout: two level,  8 + 7 steps between levels with one HR. Bed/bath upstairs and 1/2 bath on main level   Home Access: 6 step to enter without rails   Bathroom Layout: tub/shower unit, curtain   Bathroom Equipment: tub transfer bench  Toilet Height: standard height  Home Equipment: no prior equipment  Transfer Assistance: Independent without use of device  Ambulation Assistance:Independent without

## 2025-06-23 NOTE — PROGRESS NOTES
Patient assisted to the restroom using front wheeled walker and SBA. Gait steady. Large hard BM at this time. Some bright red blood noted on wipes and with stool. Patient returned to bed stating she felt much better. Took meds without difficulty and is resting in bed watching TV at this time.

## 2025-06-23 NOTE — PROGRESS NOTES
Vanessa Kimball  6/23/2025  [id number]    Chief Complaint: Acute cerebrovascular accident (HCC)    Subjective    Seen in room this morning.  No new complaints overnight.  Patient has no pain in his eating well.  She is looking forward to more therapy this afternoon.  Last Bowel Movement:  06/22/25.           Objective    Patient Vitals for the past 24 hrs:   BP Temp Temp src Pulse Resp SpO2   06/23/25 0830 104/72 98.2 °F (36.8 °C) Oral 88 18 98 %   06/23/25 0032 123/72 -- -- 84 -- 97 %   06/22/25 2358 106/69 -- -- 67 -- --   06/22/25 2328 103/67 -- -- -- -- --   06/22/25 2324 107/68 -- -- 67 18 --   06/22/25 2315 (!) 79/49 -- -- 63 20 97 %   06/22/25 2005 136/73 98 °F (36.7 °C) Oral 87 16 98 %     No data found.     Gen: No distress.  HEENT: Normocephalic, atraumatic.  CV: Extremities warm, well perfused.   Resp: No respiratory distress. CTAB.  Abd: Soft, nontender.  Ext: No edema.  Neuro: Alert, oriented, appropriately interactive.  Right hemiparesis.    Laboratory data:   Na/K/Cl/CO2:  138/4.5/103/24 (06/23 0546)   BUN/Cr/glu/ALT/AST/amyl/lip:  13/0.8/--/--/--/--/-- (06/23 0546)    WBC/Hgb/Hct/Plts:  14.4/11.8/35.2/412 (06/23 0546)            Therapy progress:       PT    Supine to Sit: Partial/moderate assistance  Sit to Supine: Partial/moderate assistance   Sit to Stand: Supervision or touching assistance  Chair/Bed to Chair Transfer: Supervision or touching assistance  Car Transfer: Partial/moderate assistance  Ambulation 10 ft: Supervision or touching assistance  Ambulation 50 ft: Supervision or touching assistance  Ambulation 150 ft: Supervision or touching assistance  Stairs - 1 Step: Supervision or touching assistance  Stairs - 4 Step: Supervision or touching assistance  Stairs - 12 Step: Supervision or touching assistance    OT    Eating:    Oral Hygiene: Partial/moderate assistance  Bathing: Partial/moderate assistance  Upper Body Dressing: Partial/moderate assistance  Lower Body Dressing:

## 2025-06-23 NOTE — PROGRESS NOTES
[facility] - Inpatient Rehabilitation Department   Phone: [phone]    Speech Therapy   Daily Treatment Note     Patient: Vanessa Kimball   : 1955   MRN: [id number]   Date of Service:  2025  Admitting Diagnosis: Acute cerebrovascular accident (HCC)  Current Admission Summary: Vanessa Kimball is a 69 y.o. female with PMHx notable for HTN, HLD, pituitary tumor s/p resection who presented to [facility] on  with right sided weakness. MRI Brain demonstrated acute left pontine infarct.   Past Medical History:  has a past medical history of Acute cerebrovascular accident (HCC), Allergic rhinitis, Chicken pox, GERD (gastroesophageal reflux disease), Hypertension, Kidney stone, Pituitary adenoma (HCC), Pituitary microadenoma (HCC), and Uterine fibroid.  Past Surgical History:  has a past surgical history that includes Hysterectomy; Total abdominal hysterectomy w/ bilateral salpingoophorectomy; pituitary surgery (); cyst removal (Left, 2013); Nasal sinus surgery (N/A, 2020); craniotomy (N/A, 2020); and brain surgery.  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  Absent or markedly hypoplastic caudal basilar   artery and small bilateral distal vertebral arteries appearing to end in the anterior inferior cerebellar arteries.  No intracranial arterial occlusion or high-grade stenosis identified.    MRI Brain    Acute bilobed nonhemorrhagic left pontine infarct.   Precautions/Restrictions: high fall risk      Pre-Admission Information   Living Status: lives with daughter  Occupation/School: graduated high school, retired  for [employer]  Medication Management: :  [x]Primary   []Secondary []No  Finance

## 2025-06-23 NOTE — PATIENT CARE CONFERENCE
[facility] Inpatient Rehabilitation Department  Weekly Team Conference Note    Patient Name: Vanessa Kimball      MRN: [id number]  : 1955  (69 y.o.) Gender: female     The team conference for this patient was held on 2025 at 11 am and was led by:  Tai Doty MD     CASE MANAGEMENT: Pt is  69 yr old F ; plan is for pt to discharge to home . During team conference team  to discuss progress with therapy, barriers to discharge, and plans to return home. Estimated discharge date set for 2025 .D/c plan for DME with [employer] for rolling walker and HHC through [facility] - orders still needed for both before discharge.  Will continue to follow for support and discharge planning.   Assessment:     PHYSICAL THERAPY    Current Status:  Bed Mobility:  Supine to sit: supervision.  Comments:   Transfers:  Sit to stand transfer: stand by assistance  Stand to sit transfer: stand by assistance  Stand step transfer: stand by assistance  Comments: Transfers completed with rolling walker.   Ambulation:   Surface:level surface  Assistive Device: rolling walker  Other Appliance: dorsiflex assist  Assistance: stand by assistance  Distance: 50' + 270' + 50'  Gait Mechanics: Reciprocal pattern with reduced charity. (R) LE foot clearance improved with DF assist. Consistent EVAN without LOB.   Comments:    Stair Mobility:   Number of Steps: 12  Step Height: 6 inch  Hand Rails: (B) handrail  Device: no device  Other Appliance: dorsiflex assist  Assistance: contact guard assistance  Comments: Step to pattern. Improved (R) LE foot clearance.   Goals:                  Short Term Goals:  Time Frame: 2-3 weeks  Patient will complete bed mobility at modified independent   Patient will complete transfers at [facility]   Patient will ambulate 150 ft with use of LRAD at [facility]  Patient will ascend/descend 12 stairs with (R) ascending handrail at modified independent  Patient will

## 2025-06-23 NOTE — CARE COORDINATION
Team conference held today.?Spoke with team to discuss progress with therapy, barriers to discharge, and plans to return home. Estimated discharge date set for 6/26/2025 .D/c plan for DME with [employer] for rolling walker and HHC through [facility] - orders still needed for both before discharge.  Will continue to follow for support and discharge planning.       Marya Matamoros MSN, RN, Case Management  Office: [phone]  Electronically signed by Marya Matamoros on 6/23/2025 at 12:01 PM

## 2025-06-23 NOTE — PROGRESS NOTES
2315- Patient called this nurse to let her know she was feeling nauseated and unwell. This nurse noted patient to be diaphoretic and have some labored breathing. Patient had been laying in bed resting prior to calling nurse. Blood pressure obtained at 79/49 with pulse of 63. RR 20. FSBS at 102. SpO2 at 97%. Placed in trendelenburg with a cool compress on forehead. Patient stated she felt a little better. Nurse sat with her coaching her through slow pursed lip breathing. BP up to 107/68.     2328- Patient returned to semi fowlers position and assisted to drink some water. BP at 103/67. Patient states she feels much better at this time, call light in reach.

## 2025-06-23 NOTE — PROGRESS NOTES
[facility] - Inpatient Rehabilitation Department   Phone: [phone]    Occupational Therapy    [] Initial Evaluation            [x] Daily Treatment Note         [] Discharge Summary      Patient: Vanessa Kimball   : 1955   MRN: [id number]   Date of Service:  2025    Admitting Diagnosis:  Acute cerebrovascular accident (HCC)  Current Admission Summary: Vanessa Kimball is a 69 y.o. female with PMHx notable for HTN, HLD, pituitary tumor s/p resection who presented to [facility] on  with right sided weakness. Initial NIHSS 2. CT Head demonstrated no acute abnormality. 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 MRI Brain demonstrated acute left pontine infarct. TTE showed LVEF 60-65%, no evidence of PFO. 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  Past Surgical History:  has a past surgical history that includes Hysterectomy; Total abdominal hysterectomy w/ bilateral salpingoophorectomy; pituitary surgery (); cyst removal (Left, 2013); Nasal sinus surgery (N/A, 2020); craniotomy (N/A, 2020); and brain surgery.    Discharge Recommendations:  OT    DME Required For Discharge: rolling walker, tub transfer bench, hand-held shower head, walker basket    Precautions/Restrictions: high fall risk  Weight Bearing Restrictions: no restrictions    Required Braces/Orthotics: no braces required  Positional Restrictions:no positional restrictions    Pre-Admission Information   Lives With: daughter    Type of Home: house  Home Layout: two level,  8 + 7 steps between

## 2025-06-23 NOTE — PLAN OF CARE
Problem: Discharge Planning  Goal: Discharge to home or other facility with appropriate resources  Outcome: Progressing  Flowsheets  Taken 6/22/2025 2005 by Carla Cho RN  Discharge to home or other facility with appropriate resources:   Identify barriers to discharge with patient and caregiver   Refer to discharge planning if patient needs post-hospital services based on physician order or complex needs related to functional status, cognitive ability or social support system  Taken 6/22/2025 0815 by Renetta De La Cruz RN  Discharge to home or other facility with appropriate resources: Identify barriers to discharge with patient and caregiver     Problem: Chronic Conditions and Co-morbidities  Goal: Patient's chronic conditions and co-morbidity symptoms are monitored and maintained or improved  Outcome: Progressing  Flowsheets  Taken 6/22/2025 2005 by Carla Cho RN  Care Plan - Patient's Chronic Conditions and Co-Morbidity Symptoms are Monitored and Maintained or Improved:   Monitor and assess patient's chronic conditions and comorbid symptoms for stability, deterioration, or improvement   Collaborate with multidisciplinary team to address chronic and comorbid conditions and prevent exacerbation or deterioration   Update acute care plan with appropriate goals if chronic or comorbid symptoms are exacerbated and prevent overall improvement and discharge  Taken 6/22/2025 0815 by Renetta De La Cruz RN  Care Plan - Patient's Chronic Conditions and Co-Morbidity Symptoms are Monitored and Maintained or Improved: Monitor and assess patient's chronic conditions and comorbid symptoms for stability, deterioration, or improvement     Problem: Safety - Adult  Goal: Free from fall injury  6/22/2025 2022 by Carla Cho RN  Outcome: Progressing     Problem: Nutrition Deficit:  Goal: Optimize nutritional status  Outcome: Progressing     Problem: ABCDS Injury Assessment  Goal: Absence of physical

## 2025-06-24 PROCEDURE — 6370000000 HC RX 637 (ALT 250 FOR IP): Performed by: STUDENT IN AN ORGANIZED HEALTH CARE EDUCATION/TRAINING PROGRAM

## 2025-06-24 PROCEDURE — 6360000002 HC RX W HCPCS: Performed by: STUDENT IN AN ORGANIZED HEALTH CARE EDUCATION/TRAINING PROGRAM

## 2025-06-24 PROCEDURE — 97530 THERAPEUTIC ACTIVITIES: CPT

## 2025-06-24 PROCEDURE — 1280000000 HC REHAB R&B

## 2025-06-24 PROCEDURE — 97112 NEUROMUSCULAR REEDUCATION: CPT

## 2025-06-24 PROCEDURE — 97116 GAIT TRAINING THERAPY: CPT

## 2025-06-24 PROCEDURE — 97110 THERAPEUTIC EXERCISES: CPT

## 2025-06-24 PROCEDURE — 97129 THER IVNTJ 1ST 15 MIN: CPT

## 2025-06-24 PROCEDURE — 97130 THER IVNTJ EA ADDL 15 MIN: CPT

## 2025-06-24 RX ORDER — NITROFURANTOIN 25; 75 MG/1; MG/1
100 CAPSULE ORAL EVERY 12 HOURS SCHEDULED
Status: DISCONTINUED | OUTPATIENT
Start: 2025-06-24 | End: 2025-06-26 | Stop reason: HOSPADM

## 2025-06-24 RX ADMIN — LOSARTAN POTASSIUM 100 MG: 25 TABLET, FILM COATED ORAL at 09:46

## 2025-06-24 RX ADMIN — STANDARDIZED SENNA CONCENTRATE AND DOCUSATE SODIUM 2 TABLET: 8.6; 5 TABLET ORAL at 09:46

## 2025-06-24 RX ADMIN — NITROFURANTOIN MONOHYDRATE/MACROCRYSTALLINE 100 MG: 25; 75 CAPSULE ORAL at 09:48

## 2025-06-24 RX ADMIN — ASPIRIN 81 MG: 81 TABLET, CHEWABLE ORAL at 09:46

## 2025-06-24 RX ADMIN — ATORVASTATIN CALCIUM 80 MG: 80 TABLET, FILM COATED ORAL at 20:44

## 2025-06-24 RX ADMIN — Medication 1000 UNITS: at 09:46

## 2025-06-24 RX ADMIN — AMLODIPINE BESYLATE 5 MG: 5 TABLET ORAL at 09:46

## 2025-06-24 RX ADMIN — STANDARDIZED SENNA CONCENTRATE AND DOCUSATE SODIUM 2 TABLET: 8.6; 5 TABLET ORAL at 20:44

## 2025-06-24 RX ADMIN — ENOXAPARIN SODIUM 40 MG: 100 INJECTION SUBCUTANEOUS at 09:46

## 2025-06-24 RX ADMIN — NITROFURANTOIN MONOHYDRATE/MACROCRYSTALLINE 100 MG: 25; 75 CAPSULE ORAL at 20:44

## 2025-06-24 ASSESSMENT — PAIN SCALES - GENERAL: PAINLEVEL_OUTOF10: 0

## 2025-06-24 NOTE — PROGRESS NOTES
[facility] - Inpatient Rehabilitation Department   Phone: [phone]    Occupational Therapy    [] Initial Evaluation            [x] Daily Treatment Note         [] Discharge Summary      Patient: Vanessa Kimball   : 1955   MRN: [id number]   Date of Service:  2025    Admitting Diagnosis:  Acute cerebrovascular accident (HCC)  Current Admission Summary: Vanessa Kimball is a 69 y.o. female with PMHx notable for HTN, HLD, pituitary tumor s/p resection who presented to [facility] on  with right sided weakness. Initial NIHSS 2. CT Head demonstrated no acute abnormality. 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 MRI Brain demonstrated acute left pontine infarct. TTE showed LVEF 60-65%, no evidence of PFO. 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  Past Surgical History:  has a past surgical history that includes Hysterectomy; Total abdominal hysterectomy w/ bilateral salpingoophorectomy; pituitary surgery (); cyst removal (Left, 2013); Nasal sinus surgery (N/A, 2020); craniotomy (N/A, 2020); and brain surgery.    Discharge Recommendations:  OT    DME Required For Discharge: rolling walker, tub transfer bench, hand-held shower head, walker basket    Precautions/Restrictions: high fall risk  Weight Bearing Restrictions: no restrictions    Required Braces/Orthotics: no braces required  Positional Restrictions:no positional restrictions    Pre-Admission Information   Lives With: daughter    Type of Home: house  Home Layout: two level,  8 + 7 steps between

## 2025-06-24 NOTE — PLAN OF CARE
Problem: Discharge Planning  Goal: Discharge to home or other facility with appropriate resources  Outcome: Progressing  Flowsheets (Taken 6/23/2025 2119)  Discharge to home or other facility with appropriate resources:   Identify barriers to discharge with patient and caregiver   Refer to discharge planning if patient needs post-hospital services based on physician order or complex needs related to functional status, cognitive ability or social support system     Problem: Chronic Conditions and Co-morbidities  Goal: Patient's chronic conditions and co-morbidity symptoms are monitored and maintained or improved  Outcome: Progressing  Flowsheets (Taken 6/23/2025 2119)  Care Plan - Patient's Chronic Conditions and Co-Morbidity Symptoms are Monitored and Maintained or Improved:   Collaborate with multidisciplinary team to address chronic and comorbid conditions and prevent exacerbation or deterioration   Update acute care plan with appropriate goals if chronic or comorbid symptoms are exacerbated and prevent overall improvement and discharge   Monitor and assess patient's chronic conditions and comorbid symptoms for stability, deterioration, or improvement     Problem: Safety - Adult  Goal: Free from fall injury  Outcome: Progressing     Problem: Nutrition Deficit:  Goal: Optimize nutritional status  Outcome: Progressing     Problem: ABCDS Injury Assessment  Goal: Absence of physical injury  Outcome: Progressing  Flowsheets (Taken 6/23/2025 2306)  Absence of Physical Injury: Implement safety measures based on patient assessment

## 2025-06-24 NOTE — PROGRESS NOTES
Vanessa JOE Jigar  6/24/2025  [id number]    Chief Complaint: Acute cerebrovascular accident (HCC)    Subjective    No acute events overnight.    Patient reports that she is doing well. Denies fevers/chills, chest pain, dyspnea, abdominal pain, nausea, dysuria.  Last Bowel Movement:  06/23/25.           Objective    Patient Vitals for the past 24 hrs:   BP Temp Temp src Pulse Resp SpO2   06/23/25 2119 135/78 98 °F (36.7 °C) Oral 73 18 98 %     No data found.     Gen: No distress.  HEENT: Normocephalic, atraumatic.  CV: Extremities warm, well perfused.   Resp: No respiratory distress. CTAB.  Abd: Soft, nontender.  Ext: No edema.  Neuro: Alert, oriented, appropriately interactive.  Right hemiparesis.    Laboratory data:   Na/K/Cl/CO2:  138/4.5/103/24 (06/23 0546)   BUN/Cr/glu/ALT/AST/amyl/lip:  13/0.8/--/--/--/--/-- (06/23 0546)    WBC/Hgb/Hct/Plts:  14.4/11.8/35.2/412 (06/23 0546)            Therapy progress:       PT    Supine to Sit: Partial/moderate assistance  Sit to Supine: Partial/moderate assistance   Sit to Stand: Supervision or touching assistance  Chair/Bed to Chair Transfer: Supervision or touching assistance  Car Transfer: Partial/moderate assistance  Ambulation 10 ft: Supervision or touching assistance  Ambulation 50 ft: Supervision or touching assistance  Ambulation 150 ft: Supervision or touching assistance  Stairs - 1 Step: Supervision or touching assistance  Stairs - 4 Step: Supervision or touching assistance  Stairs - 12 Step: Supervision or touching assistance    OT    Eating:    Oral Hygiene: Partial/moderate assistance  Bathing: Partial/moderate assistance  Upper Body Dressing: Partial/moderate assistance  Lower Body Dressing: Substantial/maximal assistance  Toilet Transfer: Partial/moderate assistance  Toilet Hygiene: Partial/moderate assistance    Speech Therapy    Pt's speech 100% intelligible with no instances of significant dysarthria. Pt making progress towards cognitive goals with improvements

## 2025-06-24 NOTE — PLAN OF CARE
Vanessa Kimball was evaluated today and a DME order was entered for a wheeled walker because she requires this to successfully complete daily living tasks of personal cares and ambulating.  A wheeled walker is necessary due to the patient's unsteady gait, upper body weakness, and inability to  an ambulation device; and she can ambulate only by pushing a walker instead of a lesser assistive device such as a cane, crutch, or standard walker.  The need for this equipment was discussed with the patient and she understands and is in agreement.     Tai Doty MD 06/24/25 8:55 AM

## 2025-06-24 NOTE — PROGRESS NOTES
[facility] - Inpatient Rehabilitation Department   Phone: [phone]    Speech Therapy   Daily Treatment Note     Patient: Vanessa Kimball   : 1955   MRN: [id number]   Date of Service:  2025  Admitting Diagnosis: Acute cerebrovascular accident (HCC)  Current Admission Summary: Vanessa Kimball is a 69 y.o. female with PMHx notable for HTN, HLD, pituitary tumor s/p resection who presented to [facility] on  with right sided weakness. MRI Brain demonstrated acute left pontine infarct.   Past Medical History:  has a past medical history of Acute cerebrovascular accident (HCC), Allergic rhinitis, Chicken pox, GERD (gastroesophageal reflux disease), Hypertension, Kidney stone, Pituitary adenoma (HCC), Pituitary microadenoma (HCC), and Uterine fibroid.  Past Surgical History:  has a past surgical history that includes Hysterectomy; Total abdominal hysterectomy w/ bilateral salpingoophorectomy; pituitary surgery (); cyst removal (Left, 2013); Nasal sinus surgery (N/A, 2020); craniotomy (N/A, 2020); and brain surgery.  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  Absent or markedly hypoplastic caudal basilar   artery and small bilateral distal vertebral arteries appearing to end in the anterior inferior cerebellar arteries.  No intracranial arterial occlusion or high-grade stenosis identified.    MRI Brain    Acute bilobed nonhemorrhagic left pontine infarct.   Precautions/Restrictions: high fall risk      Pre-Admission Information   Living Status: lives with daughter  Occupation/School: graduated high school, retired  for [employer]  Medication Management: :  [x]Primary   []Secondary []No  Finance

## 2025-06-24 NOTE — PROGRESS NOTES
[facility] - Inpatient Rehabilitation Department   Phone: [phone]    Physical Therapy    [] Initial Evaluation            [x] Daily Treatment Note         [] Discharge Summary      Patient: Vanessa Kimball   : 1955   MRN: [id number]   Date of Service:  2025  Admitting Diagnosis: Acute cerebrovascular accident (HCC)  Current Admission Summary: Vanessa Kimball is a 69 y.o. female with PMHx notable for HTN, HLD, pituitary tumor s/p resection who presented to [facility] on  with right sided weakness. MRI Brain demonstrated acute left pontine infarct.   Past Medical History:  has a past medical history of Acute cerebrovascular accident (HCC), Allergic rhinitis, Chicken pox, GERD (gastroesophageal reflux disease), Hypertension, Kidney stone, Pituitary adenoma (HCC), Pituitary microadenoma (HCC), and Uterine fibroid.  Past Surgical History:  has a past surgical history that includes Hysterectomy; Total abdominal hysterectomy w/ bilateral salpingoophorectomy; pituitary surgery (); cyst removal (Left, 2013); Nasal sinus surgery (N/A, 2020); craniotomy (N/A, 2020); and brain surgery.  Discharge Recommendations: HH PT, family assist PRN  DME Required For Discharge: rolling walker, custom AFO  Precautions/Restrictions: high fall risk  Weight Bearing Restrictions: no restrictions    Required Braces/Orthotics: no braces required  Positional Restrictions:no positional restrictions    Pre-Admission Information   Lives With: daughter               Type of Home: house  Home Layout: two level,  8 + 7 steps between levels with one HR. Bed/bath upstairs and 1/2 bath on main level   Home Access: 6 step to enter without rails   Bathroom Layout: tub/shower unit, curtain   Bathroom Equipment: tub transfer bench  Toilet Height: standard height  Home Equipment: no prior equipment  Transfer Assistance: Independent without use of device  Ambulation Assistance:Independent without

## 2025-06-24 NOTE — PROGRESS NOTES
Patient with episode of hypotension overnight. WBC elevated to 14.4. MD made aware. Request for UA. Order placed. UA sent- positive nitrates/trace leukocytes.

## 2025-06-25 PROCEDURE — 97130 THER IVNTJ EA ADDL 15 MIN: CPT

## 2025-06-25 PROCEDURE — 1280000000 HC REHAB R&B

## 2025-06-25 PROCEDURE — 97129 THER IVNTJ 1ST 15 MIN: CPT

## 2025-06-25 PROCEDURE — 97530 THERAPEUTIC ACTIVITIES: CPT

## 2025-06-25 PROCEDURE — 6370000000 HC RX 637 (ALT 250 FOR IP): Performed by: STUDENT IN AN ORGANIZED HEALTH CARE EDUCATION/TRAINING PROGRAM

## 2025-06-25 PROCEDURE — 97116 GAIT TRAINING THERAPY: CPT

## 2025-06-25 PROCEDURE — 97535 SELF CARE MNGMENT TRAINING: CPT

## 2025-06-25 PROCEDURE — 97110 THERAPEUTIC EXERCISES: CPT

## 2025-06-25 PROCEDURE — 6360000002 HC RX W HCPCS: Performed by: STUDENT IN AN ORGANIZED HEALTH CARE EDUCATION/TRAINING PROGRAM

## 2025-06-25 RX ADMIN — NITROFURANTOIN MONOHYDRATE/MACROCRYSTALLINE 100 MG: 25; 75 CAPSULE ORAL at 20:19

## 2025-06-25 RX ADMIN — STANDARDIZED SENNA CONCENTRATE AND DOCUSATE SODIUM 2 TABLET: 8.6; 5 TABLET ORAL at 20:19

## 2025-06-25 RX ADMIN — Medication 1000 UNITS: at 09:58

## 2025-06-25 RX ADMIN — STANDARDIZED SENNA CONCENTRATE AND DOCUSATE SODIUM 2 TABLET: 8.6; 5 TABLET ORAL at 09:57

## 2025-06-25 RX ADMIN — ATORVASTATIN CALCIUM 80 MG: 80 TABLET, FILM COATED ORAL at 20:19

## 2025-06-25 RX ADMIN — ENOXAPARIN SODIUM 40 MG: 100 INJECTION SUBCUTANEOUS at 09:57

## 2025-06-25 RX ADMIN — AMLODIPINE BESYLATE 5 MG: 5 TABLET ORAL at 09:58

## 2025-06-25 RX ADMIN — NITROFURANTOIN MONOHYDRATE/MACROCRYSTALLINE 100 MG: 25; 75 CAPSULE ORAL at 09:57

## 2025-06-25 RX ADMIN — LOSARTAN POTASSIUM 100 MG: 25 TABLET, FILM COATED ORAL at 09:58

## 2025-06-25 RX ADMIN — ASPIRIN 81 MG: 81 TABLET, CHEWABLE ORAL at 09:58

## 2025-06-25 ASSESSMENT — PAIN SCALES - GENERAL: PAINLEVEL_OUTOF10: 0

## 2025-06-25 NOTE — PROGRESS NOTES
Vanessa JOE Jigar  6/25/2025  [id number]    Chief Complaint: Acute cerebrovascular accident (HCC)    Subjective    No acute events overnight.    Patient reports that she is doing well. Discussed plans for discharge tomorrow with patient.  She overall feels ready for discharge, denies any specific questions or concerns. .  Last Bowel Movement:  06/24/25.           Objective    Patient Vitals for the past 24 hrs:   BP Temp Temp src Pulse Resp SpO2   06/24/25 2030 138/87 99 °F (37.2 °C) Oral 77 18 95 %   06/24/25 0945 114/77 98.4 °F (36.9 °C) Oral 89 16 97 %     No data found.     Gen: No distress.  HEENT: Normocephalic, atraumatic.  CV: Extremities warm, well perfused.   Resp: No respiratory distress. CTAB.  Abd: Soft, nontender.  Ext: No edema.  Neuro: Alert, oriented, appropriately interactive.  Right hemiparesis.    Laboratory data:   Na/K/Cl/CO2:  138/4.5/103/24 (06/23 0546)   BUN/Cr/glu/ALT/AST/amyl/lip:  13/0.8/--/--/--/--/-- (06/23 0546)    WBC/Hgb/Hct/Plts:  14.4/11.8/35.2/412 (06/23 0546)            Therapy progress:       PT    Supine to Sit: Supervision or touching assistance  Sit to Supine: Supervision or touching assistance   Sit to Stand: Supervision or touching assistance  Chair/Bed to Chair Transfer: Supervision or touching assistance  Car Transfer: Partial/moderate assistance  Ambulation 10 ft: Supervision or touching assistance  Ambulation 50 ft: Supervision or touching assistance  Ambulation 150 ft: Supervision or touching assistance  Stairs - 1 Step: Supervision or touching assistance  Stairs - 4 Step: Supervision or touching assistance  Stairs - 12 Step: Supervision or touching assistance    OT    Eating:    Oral Hygiene: Partial/moderate assistance  Bathing: Partial/moderate assistance  Upper Body Dressing: Partial/moderate assistance  Lower Body Dressing: Substantial/maximal assistance  Toilet Transfer: Partial/moderate assistance  Toilet Hygiene: Partial/moderate assistance    Speech

## 2025-06-25 NOTE — CARE COORDINATION
06/25/25 1024   IMM Letter   IMM Letter given to Patient/Family/Significant other/Guardian/POA/by: IMM given   IMM Letter date given: 06/25/25   IMM Letter time given: 1024

## 2025-06-25 NOTE — PROGRESS NOTES
[facility] - Inpatient Rehabilitation Department   Phone: [phone]    Speech Therapy   Daily Treatment Note   Discharge Summary     Patient: Vanessa Kimball   : 1955   MRN: [id number]   Date of Service:  2025  Admitting Diagnosis: Acute cerebrovascular accident (HCC)  Current Admission Summary: Vanessa Kimball is a 69 y.o. female with PMHx notable for HTN, HLD, pituitary tumor s/p resection who presented to [facility] on  with right sided weakness. MRI Brain demonstrated acute left pontine infarct.   Past Medical History:  has a past medical history of Acute cerebrovascular accident (HCC), Allergic rhinitis, Chicken pox, GERD (gastroesophageal reflux disease), Hypertension, Kidney stone, Pituitary adenoma (HCC), Pituitary microadenoma (HCC), and Uterine fibroid.  Past Surgical History:  has a past surgical history that includes Hysterectomy; Total abdominal hysterectomy w/ bilateral salpingoophorectomy; pituitary surgery (); cyst removal (Left, 2013); Nasal sinus surgery (N/A, 2020); craniotomy (N/A, 2020); and brain surgery.  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  Absent or markedly hypoplastic caudal basilar   artery and small bilateral distal vertebral arteries appearing to end in the anterior inferior cerebellar arteries.  No intracranial arterial occlusion or high-grade stenosis identified.    MRI Brain    Acute bilobed nonhemorrhagic left pontine infarct.   Precautions/Restrictions: high fall risk      Pre-Admission Information   Living Status: lives with daughter  Occupation/School: graduated high school, retired  for [employer]  Medication Management: :  [x]Primary   []Secondary

## 2025-06-25 NOTE — DISCHARGE INSTR - COC
ventilator support    Rehab Therapies: Skilled Nursing,Physical Therapy, Occupational Therapy  Weight Bearing Status/Restrictions: No weight bearing restrictions  Other Medical Equipment (for information only, NOT a DME order):  walker  Other Treatments: n/a    Patient's personal belongings (please select all that are sent with patient):  Glasses    RN SIGNATURE:  Electronically signed by Alok Lubin RN on 6/26/25 at 9:32 AM EDT    CASE MANAGEMENT/SOCIAL WORK SECTION    Inpatient Status Date: ***    Readmission Risk Assessment Score:  [facility] RISK OF UNPLANNED READMISSION 2.0             8.3 Total Score        Discharging to Facility/ Agency   Name: [facility] will call for Appointment  Phone: [phone]  Fax: [phone]    (Applicable)   Name:  Address:  Dialysis Schedule:  Phone:  Fax:    / signature: {Esignature:[id number]}    PHYSICIAN SECTION    Prognosis: Fair    Condition at Discharge: Stable    Rehab Potential (if transferring to Rehab): Fair    Recommended Labs or Other Treatments After Discharge:   #. Left pontinue ischemic stroke  #. Right hemiparesis, dysarthria due to above  - CT Head demonstrated no acute abnormality.   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   - MRI Brain demonstrated acute left pontine infarct.   - TTE showed LVEF 60-65%, no evidence of PFO.   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   Plan:  - 30 day cardiac event monitor  - ASA, high-dose statin  - BP control.  - Continue PT/OT     #. HTN  - Continue CCB, ARB     #. Acute cystitis, resolving  - Leukocytosis, UA with + LE and nitrites  - Macrobid 100 mg BID x5 days (EOT 6/28)     CODE: Full Code  Diet: ADULT DIET; Regular  Dispo: Home on 6/26  Services: C PT/OT/RN  DME: Right custom AFO, RW  Follow-ups: Neurology,

## 2025-06-25 NOTE — DISCHARGE INSTRUCTIONS
We hope your stay on rehab has exceeded your expectations. Once again the entire Acute Rehab Staff at [facility] wish to thank you for allowing us the privilege to care for you.         A few days after you are discharged from Rehab, you will receive a survey ([employer]  [place]) in the mail or through email.  This is a nationally distributed survey sent to thousands of rehab patients throughout the nation.              It is very important to everyone on the rehab unit that we receive feedback based on your experience.          Thank you, we wish you good health always,         Acute Rehab Team      Hospital Preference:     __[facility]        Medical Diagnosis/Conditions    _______________________ (free text)    Emergency Contact:    ________________________________________Phone#________________________      Advanced Directives:    Code Status: ?  []  Full Code  ?  []  DNR  ? []  DNRCC  ? []  DNRCC - Arrest    (as of date of discharge:  _________)      Medical POA: ?  []   Yes ______________________________ ?  []   No                                       (Name and phone number)                     Living Will:   ?   []   Yes    ?  []   No        Insurance Information:    _______________________ (free text)      Individual Responsible  for the coordination of the discharge/follow up:    ______________________________________________________    Functional Status:    VISUAL DEFICITS:    Yes []  No  []       If yes, assisted device:   Wears Glasses Yes []  No  []  Wears Contacts  Yes []  No  []  Legally Blind Yes []  No  []    HEARING DEFICITS:    Yes []  No  []       If yes, assisted device:   Wears Hearing Aids Yes []  No  []  Pocket Talker  Yes []  No  []       Physical Therapist & Contact #: Roberto Gar PT, DPT - [phone]  OccupationalTherapist & Contact #: RO Nelson OTR/L, [phone]  Speech Therapist & Contact #:       Activities of Daily Living:     ADL's - Adaptive

## 2025-06-25 NOTE — PROGRESS NOTES
Comments: Lou for oral care in stance at sink  Upper Extremity Bathing: modified independent  Lower Extremity Bathing: modified independent   Bathing Equipment: none  Bathing Comments: pt completed all bathing Lou while seated on TTB; savannah care via lateral leans while seated on TTB; pt required increased time to use R UE to complete L UE hygiene but able to complete w/o physical assistance  Upper Extremity Dressing: modified independent  Lower Extremity Dressing: modified independent  Dressing Equipment: none  Dressing Comments: significant time to heather/doff LB clothing over/under ankles while seated but able to completed Lou; Lou for clothing management over/under hips while in stance w/ RW; heather/doffing shirt Lou while seated; increased time and multiple attempts to tie shoe laces but able to heather/doff shoes and socks Lou  Toileting: modified independent.    Toileting Equipment: none  Toileting Comments: Lou for toileting this date; voided urine while seated; savannah care seated Lou and clothing management over/under hips while in stance Lou  General Comments: increased time for all ADL completion d/t increased fatigue  Instrumental Activities of Daily Living  Laundry: modified independent.    Laundry Comments: pt obtained clean laundry from closet and transported to bathroom Lou via hanging clothing on RW    Functional Mobility  Bed Mobility:  Bed mobility not completed on this date.  Comments:  Transfers:  Sit to stand transfer:modified independent  Stand to sit transfer: modified independent  Toilet transfer: modified independent  Toilet transfer equipment: standard toilet, grab bars, walker  Toilet transfer comments: use of grab bar on R  Shower transfer: modified independent  Shower transfer equipment: tub transfer bench, grab bars, walker  Shower transfer comments: use of grab bar on R  Comments:  Functional Mobility  Functional Mobility Activity: retrieve items, transport items, to/from

## 2025-06-25 NOTE — PLAN OF CARE
Problem: Safety - Adult  Goal: Free from fall injury  6/25/2025 1022 by Renetta De La Cruz RN  Outcome: Progressing  Note: Pt remains free from falls.  Safety precautions in place.  Bed in lowest position, bed/chair wheels locked, call light with in reach, bedside table in reach, bed/chair alarm on, fall risk wrist band on.      3-5x/week

## 2025-06-25 NOTE — PROGRESS NOTES
[facility] - Inpatient Rehabilitation Department   Phone: [phone]    Physical Therapy    [] Initial Evaluation            [x] Daily Treatment Note         [x] Discharge Summary      Patient: Vanessa Kimball   : 1955   MRN: [id number]   Date of Service:  2025  Admitting Diagnosis: Acute cerebrovascular accident (HCC)  Current Admission Summary: Vanessa Kimball is a 69 y.o. female with PMHx notable for HTN, HLD, pituitary tumor s/p resection who presented to [facility] on  with right sided weakness. MRI Brain demonstrated acute left pontine infarct.   Past Medical History:  has a past medical history of Acute cerebrovascular accident (HCC), Allergic rhinitis, Chicken pox, GERD (gastroesophageal reflux disease), Hypertension, Kidney stone, Pituitary adenoma (HCC), Pituitary microadenoma (HCC), and Uterine fibroid.  Past Surgical History:  has a past surgical history that includes Hysterectomy; Total abdominal hysterectomy w/ bilateral salpingoophorectomy; pituitary surgery (); cyst removal (Left, 2013); Nasal sinus surgery (N/A, 2020); craniotomy (N/A, 2020); and brain surgery.  Discharge Recommendations: HH PT, family assist PRN  DME Required For Discharge: rolling walker, custom AFO  Precautions/Restrictions: high fall risk  Weight Bearing Restrictions: no restrictions    Required Braces/Orthotics: no braces required  Positional Restrictions:no positional restrictions    Pre-Admission Information   Lives With: daughter               Type of Home: house  Home Layout: two level,  8 + 7 steps between levels with one HR. Bed/bath upstairs and 1/2 bath on main level   Home Access: 6 step to enter without rails   Bathroom Layout: tub/shower unit, curtain   Bathroom Equipment: tub transfer bench  Toilet Height: standard height  Home Equipment: no prior equipment  Transfer Assistance: Independent without use of device  Ambulation Assistance:Independent without

## 2025-06-25 NOTE — PLAN OF CARE
Problem: Discharge Planning  Goal: Discharge to home or other facility with appropriate resources  Outcome: Progressing     Problem: Chronic Conditions and Co-morbidities  Goal: Patient's chronic conditions and co-morbidity symptoms are monitored and maintained or improved  Outcome: Progressing     Problem: Safety - Adult  Goal: Free from fall injury  6/25/2025 1939 by Claudette Bowen RN  Outcome: Progressing     Problem: ABCDS Injury Assessment  Goal: Absence of physical injury  Outcome: Progressing

## 2025-06-26 ENCOUNTER — TELEPHONE (OUTPATIENT)
Dept: INTERNAL MEDICINE CLINIC | Age: 70
End: 2025-06-26

## 2025-06-26 VITALS
SYSTOLIC BLOOD PRESSURE: 123 MMHG | TEMPERATURE: 97.7 F | HEART RATE: 71 BPM | DIASTOLIC BLOOD PRESSURE: 72 MMHG | HEIGHT: 67 IN | WEIGHT: 167.55 LBS | BODY MASS INDEX: 26.3 KG/M2 | OXYGEN SATURATION: 99 % | RESPIRATION RATE: 16 BRPM

## 2025-06-26 LAB
ANION GAP SERPL CALCULATED.3IONS-SCNC: 11 MMOL/L (ref 3–16)
BASOPHILS # BLD: 0.1 K/UL (ref 0–0.2)
BASOPHILS NFR BLD: 1.2 %
BUN SERPL-MCNC: 14 MG/DL (ref 7–20)
CALCIUM SERPL-MCNC: 10 MG/DL (ref 8.3–10.6)
CHLORIDE SERPL-SCNC: 105 MMOL/L (ref 99–110)
CO2 SERPL-SCNC: 23 MMOL/L (ref 21–32)
CREAT SERPL-MCNC: 0.8 MG/DL (ref 0.6–1.2)
DEPRECATED RDW RBC AUTO: 14.6 % (ref 12.4–15.4)
EOSINOPHIL # BLD: 0.2 K/UL (ref 0–0.6)
EOSINOPHIL NFR BLD: 2.6 %
GFR SERPLBLD CREATININE-BSD FMLA CKD-EPI: 80 ML/MIN/{1.73_M2}
GLUCOSE SERPL-MCNC: 105 MG/DL (ref 70–99)
HCT VFR BLD AUTO: 35.1 % (ref 36–48)
HGB BLD-MCNC: 12 G/DL (ref 12–16)
LYMPHOCYTES # BLD: 2.4 K/UL (ref 1–5.1)
LYMPHOCYTES NFR BLD: 34.9 %
MCH RBC QN AUTO: 31 PG (ref 26–34)
MCHC RBC AUTO-ENTMCNC: 34.1 G/DL (ref 31–36)
MCV RBC AUTO: 90.8 FL (ref 80–100)
MONOCYTES # BLD: 0.7 K/UL (ref 0–1.3)
MONOCYTES NFR BLD: 9.8 %
NEUTROPHILS # BLD: 3.5 K/UL (ref 1.7–7.7)
NEUTROPHILS NFR BLD: 51.5 %
PLATELET # BLD AUTO: 419 K/UL (ref 135–450)
PMV BLD AUTO: 7.9 FL (ref 5–10.5)
POTASSIUM SERPL-SCNC: 4.3 MMOL/L (ref 3.5–5.1)
RBC # BLD AUTO: 3.87 M/UL (ref 4–5.2)
SODIUM SERPL-SCNC: 139 MMOL/L (ref 136–145)
WBC # BLD AUTO: 6.8 K/UL (ref 4–11)

## 2025-06-26 PROCEDURE — 85025 COMPLETE CBC W/AUTO DIFF WBC: CPT

## 2025-06-26 PROCEDURE — 6360000002 HC RX W HCPCS: Performed by: STUDENT IN AN ORGANIZED HEALTH CARE EDUCATION/TRAINING PROGRAM

## 2025-06-26 PROCEDURE — 36415 COLL VENOUS BLD VENIPUNCTURE: CPT

## 2025-06-26 PROCEDURE — 6370000000 HC RX 637 (ALT 250 FOR IP): Performed by: STUDENT IN AN ORGANIZED HEALTH CARE EDUCATION/TRAINING PROGRAM

## 2025-06-26 PROCEDURE — 80048 BASIC METABOLIC PNL TOTAL CA: CPT

## 2025-06-26 RX ORDER — AMLODIPINE BESYLATE 5 MG/1
5 TABLET ORAL DAILY
Qty: 30 TABLET | Refills: 0 | Status: SHIPPED | OUTPATIENT
Start: 2025-06-26

## 2025-06-26 RX ORDER — ASPIRIN 81 MG/1
81 TABLET, CHEWABLE ORAL DAILY
Qty: 30 TABLET | Refills: 0 | Status: SHIPPED | OUTPATIENT
Start: 2025-06-26

## 2025-06-26 RX ORDER — ATORVASTATIN CALCIUM 80 MG/1
80 TABLET, FILM COATED ORAL NIGHTLY
Qty: 30 TABLET | Refills: 0 | Status: SHIPPED | OUTPATIENT
Start: 2025-06-26

## 2025-06-26 RX ORDER — NITROFURANTOIN 25; 75 MG/1; MG/1
100 CAPSULE ORAL EVERY 12 HOURS SCHEDULED
Qty: 6 CAPSULE | Refills: 0 | Status: SHIPPED | OUTPATIENT
Start: 2025-06-26 | End: 2025-06-29

## 2025-06-26 RX ORDER — LOSARTAN POTASSIUM 100 MG/1
100 TABLET ORAL DAILY
Qty: 30 TABLET | Refills: 0 | Status: SHIPPED | OUTPATIENT
Start: 2025-06-26

## 2025-06-26 RX ORDER — SENNA AND DOCUSATE SODIUM 50; 8.6 MG/1; MG/1
2 TABLET, FILM COATED ORAL DAILY PRN
Qty: 60 TABLET | Refills: 0 | Status: SHIPPED | OUTPATIENT
Start: 2025-06-26 | End: 2025-07-26

## 2025-06-26 RX ADMIN — Medication 1000 UNITS: at 09:21

## 2025-06-26 RX ADMIN — ENOXAPARIN SODIUM 40 MG: 100 INJECTION SUBCUTANEOUS at 09:21

## 2025-06-26 RX ADMIN — NITROFURANTOIN MONOHYDRATE/MACROCRYSTALLINE 100 MG: 25; 75 CAPSULE ORAL at 09:21

## 2025-06-26 RX ADMIN — STANDARDIZED SENNA CONCENTRATE AND DOCUSATE SODIUM 2 TABLET: 8.6; 5 TABLET ORAL at 09:21

## 2025-06-26 RX ADMIN — AMLODIPINE BESYLATE 5 MG: 5 TABLET ORAL at 09:21

## 2025-06-26 RX ADMIN — ASPIRIN 81 MG: 81 TABLET, CHEWABLE ORAL at 09:21

## 2025-06-26 RX ADMIN — LOSARTAN POTASSIUM 100 MG: 25 TABLET, FILM COATED ORAL at 09:21

## 2025-06-26 NOTE — PLAN OF CARE
Problem: Discharge Planning  Goal: Discharge to home or other facility with appropriate resources  Outcome: Progressing     Problem: Chronic Conditions and Co-morbidities  Goal: Patient's chronic conditions and co-morbidity symptoms are monitored and maintained or improved  Outcome: Progressing     Problem: Safety - Adult  Goal: Free from fall injury  Outcome: Progressing     Problem: Nutrition Deficit:  Goal: Optimize nutritional status  Outcome: Progressing     Problem: ABCDS Injury Assessment  Goal: Absence of physical injury  Outcome: Progressing

## 2025-06-26 NOTE — DISCHARGE SUMMARY
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)    Overweight    Elevated glucose    Right ear impacted cerumen    Acute cerebrovascular accident (HCC)       Discharge Functional Status:      Physical therapy:  Supine to Sit: Independent  Sit to Supine: Independent      Sit to Stand: Independent  Chair/Bed to Chair Transfer: Independent  Car Transfer: Independent     Ambulation 10 ft: Independent  Ambulation 50 ft: Independent  Ambulation 150 ft: Independent  Stairs - 1 Step: Supervision or touching assistance  Stairs - 4 Step: Supervision or touching assistance  Stairs - 12 Step: Supervision or touching assistance    Occupational therapy:  Eating: Independent  Oral Hygiene: Independent  Bathing: Independent  Upper Body Dressing: Independent  Lower Body Dressing: Independent     Toilet Transfer: Independent  Toilet Hygiene: Independent    Speech therapy:    Pt's speech 100% intelligible with no instances of significant dysarthria. Pt making progress towards cognitive goals with improvements in short term memory and recall of new information with use of memory strategies. Pt requires occasional min cues with completion of executive function tasks. Suspect will need supervision/ assistance with IADLs at discharge initially with medication/ finance management. Pt reports she drives and primarily manages her medications and finances. Continue with skilled speech services in

## 2025-06-26 NOTE — PROGRESS NOTES
[facility] Discharge Assessment    Transportation  \"Has lack of transportation kept you from medical appointments, meetings, work, or from getting things needed for daily living?\"Check all that apply:  [] A.  Yes, it has kept me from medical appointments or from getting my medications  [] B.  Yes, it has kept me from non-medical meetings, appointments, work, or from getting things that I need  [x] C.  No  [] X.  Patient unable to respond  [] Y.  Patient declines to respond    Provision of Current Reconciled Medication List to Subsequent Provider at Discharge  [] No, current reconciled medication list not provided to the subsequent provider.  [x] Yes, current reconciled medication list provided to the subsequent provider. (**Select route of transmission below**)   [x] Via Electronic Health Record   [] Via Health Information Exchange Organization  [] Verbal (e.g. in person, telephone, video conferencing)  [] Paper-based (e.g. fax, copies, printouts)   [] Other Methods (e.g. texting, email, CDs)    Provision of Current Reconciled Medication List to Patient at Discharge  [] No, current reconciled medication list not provided to the patient, family and/or caregiver.   [x] Yes, current reconciled medication list provided to the patient, family and/or caregiver.  (**Select route of transmission below**)   [] Via Electronic Health Record (e.g., electronic access to patient portal)   [] Via Health Information Exchange Organization  [x] Verbal (e.g. in person, telephone, video conferencing)  [x] Paper-based (e.g. fax, copies, printouts)   [] Other Methods (e.g. texting, email, CDs)    Health Literacy  \"How often do you need to have someone help you when you read instructions, pamphlets, or other written material from your doctor or pharmacy?\"  [x]  0.  Never  []  1.  Rarely  []  2.  Sometimes  []  3.  Often  []  4.  Always  []  7.  Patient declines to respond  []  8.  Patient unable to respond    BIMS - **Must be completed in the

## 2025-06-26 NOTE — CARE COORDINATION
Case Management -  Discharge Note      Patient Name: Vanessa Kimball                   YOB: 1955  Room: [place] [address]            Readmission Risk (Low < 19, Mod (19-27), High > 27): Readmission Risk Score: 8.3    Current PCP: Consuelo Braxton APRN - CNP      (IMM) Important Message from Medicare:    Has pt received appropriate compliance notices before being discharged if required: yes  Compliance doc:  [x] 2nd IMM; [] Code 44 [] [place]  Date Given: 6/25/2025 Given By: tamar Duke    PT AM-PAC:   /24  OT AM-PAC:   /24    Patient/patient representative has been educated on the benefits of HHC and DME needs as well as the possible risks of declining recommended services. Patient/patient representative has acknowledged the information provided and decided on the following discharge plan. Patient/ patient representative has been provided freedom of choice regarding service provider, supported by basic dialogue that supports the patient's individualized plan of care/goals.    [facility]  [address] [place] [address]  Phone:  [phone]  Fax: [phone]   DME provided:  [facility]  [address]   [facility] [address]  Phone: [phone]  Fax: [phone]               [facility] agency notified of discharge:  [x] Yes [] No  [] NA    Family notified of discharge:  [x] Yes  [] No  [] NA    Facility notified of discharge:  [] Yes  [] No  [x] NA    Pt is being discharged with Outpt IV Antibiotics  [] Yes [x] No    If yes, make sure TREMAINE is faxed to [facility] agency, and meds are called in to pharmacy by RN from TREMAINE orders only.      Financial    Payor: MEDICARE / Plan: MEDICARE PART A AND B / Product Type: *No Product type* /     Pharmacy:  Potential assistance Purchasing Medications: No  Meds-to-Beds request:        [id number] - [place], [place] - [address] - P [phone] - F [phone]  [phone]

## 2025-06-26 NOTE — PROGRESS NOTES
Pt discharged as per physician order. Pt's belonging packed and taken with pt while transferring. All questions and queries answered. Discharge instruction reviewed with pt. Pt being transported by her daughter. Pt dropped to the car via wheelchair.

## 2025-06-26 NOTE — PROGRESS NOTES
Discharge instruction reviewed with the pt. Pt made aware on f/u with neurology and pcp. Pt verbalize understanding.

## 2025-06-26 NOTE — PROGRESS NOTES
Pt comfortably resting in bed. VSS. Denies any pain at this moment. Morning meds given per MAR. PWWW. AXOX4. Daughter at the bedside. Call light and bedside table in reach. Bed in the lowest position, locked and alarm on. The care plan and education has been reviewed and mutually agreed upon with the patient.

## 2025-06-26 NOTE — TELEPHONE ENCOUNTER
Maria C from [employer] calling in asking for orders for SN, PT, OT.    She may be reached at [phone].    Pt discharged today from [facility] dept.

## 2025-06-26 NOTE — TELEPHONE ENCOUNTER
Care Transitions Initial Follow Up Call    Call within 2 business days of discharge: Yes     Patient: Vanessa Kimball Patient : 1955 MRN: [id number]      RARS: Readmission Risk Score: 6.8       Spoke with: Patient     Discharge department/facility: Home     Non-face-to-face services provided:  Scheduled appointment with PCP-2025    Follow Up  Future Appointments   Date Time Provider Department Center   2025 11:00 AM [facility]       Vicky Espinoza, MA

## 2025-07-01 ENCOUNTER — OFFICE VISIT (OUTPATIENT)
Dept: INTERNAL MEDICINE CLINIC | Age: 70
End: 2025-07-01

## 2025-07-01 VITALS
DIASTOLIC BLOOD PRESSURE: 72 MMHG | BODY MASS INDEX: 26.84 KG/M2 | OXYGEN SATURATION: 99 % | HEIGHT: 67 IN | WEIGHT: 171 LBS | SYSTOLIC BLOOD PRESSURE: 136 MMHG | HEART RATE: 72 BPM

## 2025-07-01 DIAGNOSIS — F41.9 ANXIETY: ICD-10-CM

## 2025-07-01 DIAGNOSIS — R73.09 ELEVATED GLUCOSE: ICD-10-CM

## 2025-07-01 DIAGNOSIS — D35.2 BENIGN NEOPLASM OF PITUITARY GLAND AND CRANIOPHARYNGEAL DUCT (HCC): ICD-10-CM

## 2025-07-01 DIAGNOSIS — E78.2 MIXED HYPERLIPIDEMIA: ICD-10-CM

## 2025-07-01 DIAGNOSIS — I69.359 HEMIPARESIS AFFECTING DOMINANT SIDE AS LATE EFFECT OF STROKE (HCC): ICD-10-CM

## 2025-07-01 DIAGNOSIS — I10 ESSENTIAL HYPERTENSION: ICD-10-CM

## 2025-07-01 DIAGNOSIS — I65.23 CAROTID STENOSIS, BILATERAL: ICD-10-CM

## 2025-07-01 DIAGNOSIS — E55.9 VITAMIN D DEFICIENCY: ICD-10-CM

## 2025-07-01 DIAGNOSIS — D35.2 PITUITARY ADENOMA (HCC): ICD-10-CM

## 2025-07-01 DIAGNOSIS — I69.30 HISTORY OF CVA WITH RESIDUAL DEFICIT: ICD-10-CM

## 2025-07-01 DIAGNOSIS — Z09 HOSPITAL DISCHARGE FOLLOW-UP: Primary | ICD-10-CM

## 2025-07-01 DIAGNOSIS — D35.3 BENIGN NEOPLASM OF PITUITARY GLAND AND CRANIOPHARYNGEAL DUCT (HCC): ICD-10-CM

## 2025-07-01 PROBLEM — H61.21 RIGHT EAR IMPACTED CERUMEN: Status: RESOLVED | Noted: 2024-07-22 | Resolved: 2025-07-01

## 2025-07-01 PROBLEM — R51.9 SINUS HEADACHE: Status: RESOLVED | Noted: 2017-01-27 | Resolved: 2025-07-01

## 2025-07-01 RX ORDER — AMLODIPINE BESYLATE 5 MG/1
5 TABLET ORAL DAILY
Qty: 90 TABLET | Refills: 3 | Status: SHIPPED | OUTPATIENT
Start: 2025-07-01

## 2025-07-01 RX ORDER — HYDROXYZINE HYDROCHLORIDE 25 MG/1
25 TABLET, FILM COATED ORAL EVERY 8 HOURS PRN
Qty: 60 TABLET | Refills: 1 | Status: SHIPPED | OUTPATIENT
Start: 2025-07-01

## 2025-07-01 RX ORDER — ASPIRIN 81 MG/1
81 TABLET, CHEWABLE ORAL DAILY
Qty: 90 TABLET | Refills: 3 | Status: SHIPPED | OUTPATIENT
Start: 2025-07-01

## 2025-07-01 RX ORDER — LOSARTAN POTASSIUM 100 MG/1
100 TABLET ORAL DAILY
Qty: 90 TABLET | Refills: 3 | Status: SHIPPED | OUTPATIENT
Start: 2025-07-01

## 2025-07-01 RX ORDER — ATORVASTATIN CALCIUM 80 MG/1
80 TABLET, FILM COATED ORAL NIGHTLY
Qty: 90 TABLET | Refills: 3 | Status: SHIPPED | OUTPATIENT
Start: 2025-07-01

## 2025-07-01 NOTE — ASSESSMENT & PLAN NOTE
Chronic.   LDL 87 during admission, LDL goal <70  Atorvastatin dose increased to 80 mg daily, continue.

## 2025-07-01 NOTE — ASSESSMENT & PLAN NOTE
- Left pontinue ischemic stroke, 6/9/2025   - admission notes and work up at [facility] and [facility] reviewed in detail   - Residual right hemiparesis, dysarthria limiting independence with functional mobility and self care  - improving daily   - referral placed for neurology   - [facility] set up

## 2025-07-01 NOTE — ASSESSMENT & PLAN NOTE
Hx of pituitary adenoma s/p transsphenoidal approach resection (2020) and gamma knife radiosurgery (2021) with Dr. Otf Le on imaging.

## 2025-07-01 NOTE — ASSESSMENT & PLAN NOTE
- Left pontinue ischemic stroke, 6/9/2025   - Residual right hemiparesis, dysarthria limiting independence with functional mobility and self care  - improving daily   - referral placed for neurology   - [facility] set up

## 2025-07-01 NOTE — ASSESSMENT & PLAN NOTE
Chronic,stable.   - Continue losartan 100 mg daily and amlodipine 5 mg daily.   - no longer on hctz   - Advised to limit caffeine and salt intake, increase water consumption, and monitor blood pressure one hour post-medication.

## 2025-07-01 NOTE — ASSESSMENT & PLAN NOTE
Chronic, stable.   6/10 A1c 6.2, up from 5.7 during admission   Diet should be high in plant matter (fruits, veggies and whole grains), low in saturated fats, processed foods, sugar drinks.  Regular aerobic exercise will also help.

## 2025-07-02 ENCOUNTER — TELEPHONE (OUTPATIENT)
Dept: INTERNAL MEDICINE CLINIC | Age: 70
End: 2025-07-02

## 2025-07-02 NOTE — TELEPHONE ENCOUNTER
Tierra from [employer] calling in for orders for SN, PT, OT. ST.    She may be reached at [phone], VM is secure.    Pt has a HFU visit yesterday.

## 2025-07-18 LAB — ECHO BSA: 1.9 M2

## 2025-07-21 LAB — ECHO BSA: 1.91 M2

## 2025-07-30 ENCOUNTER — TELEPHONE (OUTPATIENT)
Dept: INTERNAL MEDICINE CLINIC | Age: 70
End: 2025-07-30

## 2025-07-30 DIAGNOSIS — I47.19 PAT (PAROXYSMAL ATRIAL TACHYCARDIA): ICD-10-CM

## 2025-07-30 DIAGNOSIS — I47.29 NSVT (NONSUSTAINED VENTRICULAR TACHYCARDIA) (HCC): ICD-10-CM

## 2025-07-30 DIAGNOSIS — I69.30 HISTORY OF CVA WITH RESIDUAL DEFICIT: Primary | ICD-10-CM

## 2025-07-30 NOTE — TELEPHONE ENCOUNTER
LM for pt to call back   Some fast beats, nothing major noted on the monitor. I placed a referral for cardiology to make sure we do not need to do anything else since you had the stroke.

## 2025-07-30 NOTE — TELEPHONE ENCOUNTER
Pt given the message and she voiced understanding. She was given the referral info and will call them.

## 2025-08-21 ENCOUNTER — PATIENT MESSAGE (OUTPATIENT)
Dept: INTERNAL MEDICINE CLINIC | Age: 70
End: 2025-08-21

## 2025-08-21 DIAGNOSIS — I69.30 HISTORY OF CVA WITH RESIDUAL DEFICIT: ICD-10-CM

## 2025-08-21 DIAGNOSIS — I10 ESSENTIAL HYPERTENSION: ICD-10-CM

## 2025-08-22 RX ORDER — ATORVASTATIN CALCIUM 80 MG/1
80 TABLET, FILM COATED ORAL NIGHTLY
Qty: 90 TABLET | Refills: 3 | Status: SHIPPED | OUTPATIENT
Start: 2025-08-22

## 2025-08-22 RX ORDER — ASPIRIN 81 MG/1
81 TABLET, CHEWABLE ORAL DAILY
Qty: 90 TABLET | Refills: 3 | Status: SHIPPED | OUTPATIENT
Start: 2025-08-22

## 2025-08-22 RX ORDER — AMLODIPINE BESYLATE 5 MG/1
5 TABLET ORAL DAILY
Qty: 90 TABLET | Refills: 3 | Status: SHIPPED | OUTPATIENT
Start: 2025-08-22

## (undated) DEVICE — SUTURE PROL SZ 2-0 L30IN NONABSORBABLE BLU L26MM CT-2 1/2 8411H

## (undated) DEVICE — SPONGE GZ W4XL4IN COT 12 PLY TYP VII WVN C FLD DSGN

## (undated) DEVICE — MICRODISSECTION NEEDLE STRAIGHT SLEEVE: Brand: COLORADO

## (undated) DEVICE — BLANKET WRM W40.2XL55.9IN IORT LO BODY + MISTRAL AIR

## (undated) DEVICE — GLOVE ORANGE PI 7 1/2   MSG9075

## (undated) DEVICE — GARMENT,MEDLINE,DVT,INT,CALF,MED, GEN2: Brand: MEDLINE

## (undated) DEVICE — JEWISH HOSPITAL TURNOVER KIT: Brand: MEDLINE INDUSTRIES, INC.

## (undated) DEVICE — PACK,EENT,TURBAN DRAPE,PK II: Brand: MEDLINE

## (undated) DEVICE — SUTURE MCRYL SZ 4-0 L27IN ABSRB UD L19MM PS-2 1/2 CIR PRIM Y426H

## (undated) DEVICE — NEEDLE SPNL 25GA L3.5IN BLU HUB S STL REG WALL FIT STYL W/

## (undated) DEVICE — 3M™ IOBAN™ 2 ANTIMICROBIAL INCISE DRAPE 6640EZ: Brand: IOBAN™ 2

## (undated) DEVICE — CONTAINER,SPECIMEN,PNEU TUBE,3OZ,OR STRL: Brand: MEDLINE

## (undated) DEVICE — INSTRUMENT TRACKER 9733533XOM ENT 1PK

## (undated) DEVICE — 3M™ TEGADERM™ TRANSPARENT FILM DRESSING FRAME STYLE, 1624W, 2-3/8 IN X 2-3/4 IN (6 CM X 7 CM), 100/CT 4CT/CASE: Brand: 3M™ TEGADERM™

## (undated) DEVICE — COVER LT HNDL BLU PLAS

## (undated) DEVICE — TUBE SUCT LAPSCP

## (undated) DEVICE — DISPOSABLE IRRIGATION CASSETTE: Brand: CORE

## (undated) DEVICE — SOLUTION IV 250ML 0.9% SOD CHL PH 5 INJ USP VIAFLX PLAS

## (undated) DEVICE — TUBING, SUCTION, 1/4" X 12', STRAIGHT: Brand: MEDLINE

## (undated) DEVICE — SPONGE,NEURO,0.5"X3",XR,STRL,LF,10/PK: Brand: MEDLINE

## (undated) DEVICE — NEURO SPONGES: Brand: DEROYAL

## (undated) DEVICE — SURE SET-DOUBLE BASIN-LF: Brand: MEDLINE INDUSTRIES, INC.

## (undated) DEVICE — PLATE ES AD W 9FT CRD 2

## (undated) DEVICE — GLOVE SURG SZ 75 L12IN FNGR THK94MIL TRNSLUC YEL LTX

## (undated) DEVICE — MARKER,SKIN,WI/RULER AND LABELS: Brand: MEDLINE

## (undated) DEVICE — DISPOSABLE CURVED APPLICATOR

## (undated) DEVICE — DRAPE,INSTRUMENT,MAGNETIC,10X16: Brand: MEDLINE

## (undated) DEVICE — UNDERGLOVE SURG SZ 8 BLU LTX FREE SYN POLYISOPRENE POLYMER

## (undated) DEVICE — KIT,ANTI FOG,W/SPONGE & FLUID,SOFT PACK: Brand: MEDLINE

## (undated) DEVICE — DRAPE SLUSH DISC W44XL66IN ST FOR RND BSIN HUSH SLUSH SYS

## (undated) DEVICE — 3L THIN WALL CAN: Brand: CRD

## (undated) DEVICE — COVER,MAYO STAND,STERILE: Brand: MEDLINE

## (undated) DEVICE — SYRINGE IRRIG 60ML SFT PLIABLE BLB EZ TO GRP 1 HND USE W/

## (undated) DEVICE — SUTURE VCRL SZ 3-0 L18IN ABSRB UD L26MM SH 1/2 CIR J864D

## (undated) DEVICE — GAUZE,SPONGE,4"X4",16PLY,XRAY,STRL,LF: Brand: MEDLINE

## (undated) DEVICE — PATIENT TRACKER 9734887XOM NON-INVASIVE

## (undated) DEVICE — TOWEL,OR,DSP,ST,BLUE,DLX,8/PK,10PK/CS: Brand: MEDLINE

## (undated) DEVICE — SYRINGE 20ML LL S/C 50

## (undated) DEVICE — COUNTER NDL 40 COUNT HLD 70 NUM FOAM BLK SGL MAG W BLDE REMV

## (undated) DEVICE — TRAY CATHETER 16FR F INCLUDE BARDX IC COMPLT CARE DRNGE BG

## (undated) DEVICE — TUBING 1895522 5PK STRAIGHTSHOT TO XPS: Brand: STRAIGHTSHOT®

## (undated) DEVICE — SHEET,DRAPE,53X77,STERILE: Brand: MEDLINE

## (undated) DEVICE — BLADE 1884080EM TRICUT 4MMX13CM M4 ROHS: Brand: FUSION®

## (undated) DEVICE — SHEET,T,THYROID,STERILE: Brand: MEDLINE

## (undated) DEVICE — SUTURE ABSORBABLE MONOFILAMENT 4-0 SC-1 18 IN PLN GUT 1824H

## (undated) DEVICE — CABLE BPLR L12FT FLYING LD DISPOSABLE

## (undated) DEVICE — BLADE SURG THK038MM 30DEG MIC S STL STR

## (undated) DEVICE — FIRM 8CM: Brand: NASOPORE

## (undated) DEVICE — SOLUTION IRRIG 1000ML LAC RINGER PLAS POUR BTL

## (undated) DEVICE — DRESSING GERM DIA1IN CNTR H DIA7MM BLU CHG ANTIMIC PROTCT

## (undated) DEVICE — PROTECTOR ULN NRV PUR FOAM HK LOOP STRP ANATOMICALLY

## (undated) DEVICE — SURGICAL SET UP - SURE SET: Brand: MEDLINE INDUSTRIES, INC.

## (undated) DEVICE — INTENDED USE FOR SURGICAL MARKING ON INTACT SKIN, ALSO PROVIDES A PERMANENT METHOD OF IDENTIFYING OBJECTS IN THE OPERATING ROOM: Brand: WRITESITE® PLUS MINI PREP RESISTANT MARKER

## (undated) DEVICE — SHEATH 1912000 5PK 4MM/0DEG STORZ XOMED: Brand: ENDO-SCRUB®

## (undated) DEVICE — SYRINGE MED 10ML TRNSLUC BRL PLUNG BLK MRK POLYPR CTRL